# Patient Record
Sex: FEMALE | Race: BLACK OR AFRICAN AMERICAN | NOT HISPANIC OR LATINO | Employment: OTHER | URBAN - METROPOLITAN AREA
[De-identification: names, ages, dates, MRNs, and addresses within clinical notes are randomized per-mention and may not be internally consistent; named-entity substitution may affect disease eponyms.]

---

## 2017-02-07 ENCOUNTER — HOSPITAL ENCOUNTER (EMERGENCY)
Facility: HOSPITAL | Age: 67
Discharge: HOME | End: 2017-02-07
Attending: EMERGENCY MEDICINE
Payer: COMMERCIAL

## 2017-02-07 VITALS
SYSTOLIC BLOOD PRESSURE: 148 MMHG | OXYGEN SATURATION: 98 % | HEIGHT: 63 IN | HEART RATE: 98 BPM | BODY MASS INDEX: 28.35 KG/M2 | DIASTOLIC BLOOD PRESSURE: 65 MMHG | RESPIRATION RATE: 18 BRPM | WEIGHT: 160 LBS | TEMPERATURE: 98 F

## 2017-02-07 DIAGNOSIS — S46.911A RIGHT SHOULDER STRAIN, INITIAL ENCOUNTER: ICD-10-CM

## 2017-02-07 DIAGNOSIS — G44.209 HEADACHE, EMOTIONAL TENSION: ICD-10-CM
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
## 2017-02-07 PROCEDURE — 25000003 PHARM REV CODE 250: Performed by: NURSE PRACTITIONER

## 2017-02-07 PROCEDURE — 99283 EMERGENCY DEPT VISIT LOW MDM: CPT

## 2017-02-07 RX ORDER — IBUPROFEN 600 MG/1
600 TABLET ORAL
Status: COMPLETED | OUTPATIENT
Start: 2017-02-07 | End: 2017-02-07

## 2017-02-07 RX ORDER — METHOCARBAMOL 750 MG/1
1500 TABLET, FILM COATED ORAL
Status: COMPLETED | OUTPATIENT
Start: 2017-02-07 | End: 2017-02-07

## 2017-02-07 RX ORDER — NAPROXEN 500 MG/1
500 TABLET ORAL 2 TIMES DAILY WITH MEALS
Qty: 14 TABLET | Refills: 0 | Status: ON HOLD | OUTPATIENT
Start: 2017-02-07 | End: 2019-12-25

## 2017-02-07 RX ORDER — METHOCARBAMOL 500 MG/1
1000 TABLET, FILM COATED ORAL 3 TIMES DAILY
Qty: 30 TABLET | Refills: 0 | Status: SHIPPED | OUTPATIENT
Start: 2017-02-07 | End: 2017-02-12

## 2017-02-07 RX ADMIN — IBUPROFEN 600 MG: 600 TABLET, FILM COATED ORAL at 04:02

## 2017-02-07 RX ADMIN — METHOCARBAMOL 1500 MG: 750 TABLET ORAL at 04:02

## 2017-02-07 NOTE — DISCHARGE INSTRUCTIONS
Tension Headache    A muscle tension headache is a very common cause of head pain. Its also called a stress headache. When some people are under stress, they tense the muscles of their shoulder, neck, and scalp without knowing it. If this tension lasts long enough, a headache can occur. A tension headache can be quite painful. It can last for hours or even days.  Home care  Follow these tips when caring for yourself at home:  · Dont drive yourself home if you were given pain medicine for your headache. Instead, have someone else drive you home. Try to sleep when you get home. You should feel much better when you wake up.  · Put heat on the back of your neck to help ease neck spasm.  · Drink only clear liquids or eat a light diet until your symptoms get better. This will help you avoid nausea or vomiting.  How to prevent headaches  · Figure out what is causing stress in your life. Learn new ways to handle your stress. Ideas include regular exercise, biofeedback, self-hypnosis, yoga, and meditation. Talk with your healthcare provider to find out more information about managing stress. Many books and digital media are also available on this subject.  · Take time out at the first sign of a tension headache, if possible. Take yourself out of the stressful situation. Find a quiet, comfortable place to sit or lie down and let yourself relax. Heat and deep massage of the tight areas in the neck and shoulders may help ease muscle spasm. You may also get relief from a medicine like ibuprofen or a prescribed muscle relaxant.  Follow-up care  Follow up with your healthcare provider, or as advised. Talk with your provider if you have frequent headaches. He or she can figure out a treatment plan. Ask if you can have medicine to take at home the next time you get a bad headache. This may keep you from having to visit the emergency department in the future. You may need to see a headache specialist (neurologist) if you continue  to have headaches.  When to seek medical advice  Call your healthcare provider right away if any of these occur:  · Your head pain gets worse during sexual intercourse or strenuous activity  · Your head pain doesnt get better within 24 hours  · You arent able to keep liquids down (repeated vomiting)  · Fever of 100.4ºF (38ºC) or higher, or as directed by your healthcare provider  · Stiff neck  · Extreme drowsiness, confusion, or fainting  · Dizziness or dizziness with spinning sensation (vertigo)  · Weakness in an arm or leg or one side of your face  · You have difficulty speaking  · Your vision changes  Date Last Reviewed: 8/1/2016 © 2000-2016 [employer]. [address] [place] [address], [place] [address]. All rights reserved. This information is not intended as a substitute for professional medical care. Always follow your healthcare professional's instructions.          Self-Care for Strains and Sprains  Most minor strains and sprains can be treated with self-care. Recovering from a strain or sprain may take 6 to 8 weeks. Your self-care goal is to reduce pain and immobilize the injury to speed healing.     A sprain injures ligaments (tissue that connects bones to bones).        A strain injures muscles or tendons (tissue that connects muscles to bones).   Support the injured area  Wrapping the injured area provides support for short, necessary activities. Be careful not to wrap the area too tightly. This could cut off the blood supply.  · Support a wrist, elbow, or shoulder with a sling.  · Wrap an ankle or knee with an elastic bandage.  · Tape a finger or toe to the one next to it.  Use cold and heat  Cold reduces swelling. Both cold and heat reduce pain. Heat should not be used in the initial treatment of the injury. When using cold or heat, always place a towel between the pack and your skin.  · Apply ice or a cold pack 10 to 15 minutes every hour youre awake for the first 2 days.  · After the  swelling goes down, use cold or heat to control pain. Dont use heat late in the day, since it can cause swelling when youre not active.  Rest and elevate  Rest and elevation help your injury heal faster.  · Raise the injured area above your heart level.  · Keep the injured area from moving.  · Limit the use of the joint or limb.  Use medicine  · Aspirin reduces pain and swelling. (Note: Dont give aspirin to a child 18 or younger unless prescribed by the doctor.)  · Aspirin substitutes, such as ibuprofen, can reduce pain. Some substitutes reduce swelling, too. Ask your pharmacist which substitutes you can use.  Call your doctor if:  · The injured joint wont move, or bones make a grating sound when they move.  · You cant put weight on the injured area, even after 24 hours.  · The injured body part is cold, blue, or numb.  · The joint or limb appears bent or crooked.  · Pain increases or doesnt improve in 4 days.  · When pressing along the injured area, you notice a spot that is especially painful.   Date Last Reviewed: 9/29/2015 © 2000-2016 [employer]. [address] [place] [address], [place] [address]. All rights reserved. This information is not intended as a substitute for professional medical care. Always follow your healthcare professional's instructions.
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
## 2017-03-07 ENCOUNTER — CLINICAL SUPPORT (OUTPATIENT)
Dept: REHABILITATION | Facility: HOSPITAL | Age: 67
End: 2017-03-07
Attending: ORTHOPAEDIC SURGERY
Payer: MEDICARE

## 2017-03-07 DIAGNOSIS — M25.561 PAIN IN BOTH KNEES, UNSPECIFIED CHRONICITY: ICD-10-CM

## 2017-03-07 DIAGNOSIS — R29.898 WEAKNESS OF BOTH LOWER EXTREMITIES: ICD-10-CM

## 2017-03-07 DIAGNOSIS — M25.562 PAIN IN BOTH KNEES, UNSPECIFIED CHRONICITY: ICD-10-CM

## 2017-03-07 DIAGNOSIS — R26.89 DECREASED FUNCTIONAL MOBILITY: ICD-10-CM

## 2017-03-07 PROCEDURE — G8978 MOBILITY CURRENT STATUS: HCPCS | Mod: CL,PN

## 2017-03-07 PROCEDURE — 97110 THERAPEUTIC EXERCISES: CPT | Mod: PN

## 2017-03-07 PROCEDURE — 97161 PT EVAL LOW COMPLEX 20 MIN: CPT | Mod: PN

## 2017-03-07 PROCEDURE — G8979 MOBILITY GOAL STATUS: HCPCS | Mod: CK,PN
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
## 2017-04-13 ENCOUNTER — HOSPITAL ENCOUNTER (OUTPATIENT)
Dept: RADIOLOGY | Facility: HOSPITAL | Age: 67
Discharge: HOME | End: 2017-04-13
Attending: SPECIALIST
Payer: MEDICARE

## 2017-04-13 DIAGNOSIS — M75.101 UNSPECIFIED ROTATOR CUFF TEAR OR RUPTURE OF RIGHT SHOULDER, NOT SPECIFIED AS TRAUMATIC: ICD-10-CM

## 2017-04-13 PROCEDURE — 73221 MRI JOINT UPR EXTREM W/O DYE: CPT | Mod: TC,RT

## 2017-04-13 PROCEDURE — 73221 MRI JOINT UPR EXTREM W/O DYE: CPT | Mod: 26,RT,, | Performed by: RADIOLOGY

## 2017-05-31 ENCOUNTER — HOSPITAL ENCOUNTER (OUTPATIENT)
Dept: RADIOLOGY | Facility: HOSPITAL | Age: 67
Discharge: HOME | End: 2017-05-31
Attending: SPECIALIST
Payer: MEDICARE

## 2017-05-31 DIAGNOSIS — Z12.31 VISIT FOR SCREENING MAMMOGRAM: ICD-10-CM

## 2017-05-31 PROCEDURE — 77067 SCR MAMMO BI INCL CAD: CPT | Mod: TC

## 2017-05-31 PROCEDURE — 77067 SCR MAMMO BI INCL CAD: CPT | Mod: 26,,, | Performed by: RADIOLOGY

## 2017-09-14 DIAGNOSIS — M81.0 OP (OSTEOPOROSIS): Primary | ICD-10-CM

## 2017-09-25 ENCOUNTER — HOSPITAL ENCOUNTER (OUTPATIENT)
Dept: RADIOLOGY | Facility: HOSPITAL | Age: 67
Discharge: HOME | End: 2017-09-25
Attending: SPECIALIST
Payer: MEDICARE

## 2017-09-25 DIAGNOSIS — M81.0 OP (OSTEOPOROSIS): ICD-10-CM

## 2017-09-25 PROCEDURE — 77080 DXA BONE DENSITY AXIAL: CPT | Mod: TC

## 2017-09-25 PROCEDURE — 77080 DXA BONE DENSITY AXIAL: CPT | Mod: 26,,, | Performed by: RADIOLOGY

## 2018-01-09 ENCOUNTER — DOCUMENTATION ONLY (OUTPATIENT)
Dept: REHABILITATION | Facility: HOSPITAL | Age: 68
End: 2018-01-09

## 2018-01-09 DIAGNOSIS — M25.562 PAIN IN BOTH KNEES, UNSPECIFIED CHRONICITY: ICD-10-CM

## 2018-01-09 DIAGNOSIS — R29.898 WEAKNESS OF BOTH LOWER EXTREMITIES: ICD-10-CM

## 2018-01-09 DIAGNOSIS — M25.561 PAIN IN BOTH KNEES, UNSPECIFIED CHRONICITY: ICD-10-CM

## 2018-01-09 DIAGNOSIS — R26.89 DECREASED FUNCTIONAL MOBILITY: ICD-10-CM

## 2018-01-10 NOTE — PROGRESS NOTES
REHAB SERVICES OUTPATIENT DISCHARGE SUMMARY  Physical Therapy      Name:  Flor Erwin  Date:  1/9/18  Date of Evaluation: 3/7/17  Physician:  Cezar Shea MD  Total # Of Visits:  1  Diagnosis:    1. Pain in both knees, unspecified chronicity     2. Weakness of both lower extremities     3. Decreased functional mobility         Physical/Functional Status:  At time of discharge, patient status unknown secondary to pt not attending PT since initial evaluation on 3/7/17.     The patient is to be discharged from our Therapy service for the following reason(s):  Patient has not attended therapy since 3/7/17      Discharge Plan:  Other:  Pt is discharged from PT

## 2018-01-12 DIAGNOSIS — M17.9 OSTEOARTHRITIS OF KNEE, UNSPECIFIED (CODE): Primary | ICD-10-CM

## 2018-02-05 ENCOUNTER — CLINICAL SUPPORT (OUTPATIENT)
Dept: REHABILITATION | Facility: HOSPITAL | Age: 68
End: 2018-02-05
Payer: MEDICARE

## 2018-02-05 DIAGNOSIS — M25.561 CHRONIC PAIN OF RIGHT KNEE: ICD-10-CM

## 2018-02-05 DIAGNOSIS — R29.898 WEAKNESS OF BOTH LOWER EXTREMITIES: ICD-10-CM

## 2018-02-05 DIAGNOSIS — G89.29 CHRONIC PAIN OF RIGHT KNEE: ICD-10-CM

## 2018-02-05 DIAGNOSIS — M25.661 DECREASED ROM OF RIGHT KNEE: ICD-10-CM

## 2018-02-05 PROCEDURE — 97161 PT EVAL LOW COMPLEX 20 MIN: CPT | Mod: PN

## 2018-02-05 PROCEDURE — G8979 MOBILITY GOAL STATUS: HCPCS | Mod: CK,PN

## 2018-02-05 PROCEDURE — G8978 MOBILITY CURRENT STATUS: HCPCS | Mod: CL,PN

## 2018-02-05 PROCEDURE — 97110 THERAPEUTIC EXERCISES: CPT | Mod: PN
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
## 2018-02-08 ENCOUNTER — CLINICAL SUPPORT (OUTPATIENT)
Dept: REHABILITATION | Facility: HOSPITAL | Age: 68
End: 2018-02-08
Payer: MEDICARE

## 2018-02-08 DIAGNOSIS — G89.29 CHRONIC PAIN OF RIGHT KNEE: ICD-10-CM

## 2018-02-08 DIAGNOSIS — R29.898 WEAKNESS OF BOTH LOWER EXTREMITIES: ICD-10-CM

## 2018-02-08 DIAGNOSIS — M25.561 CHRONIC PAIN OF RIGHT KNEE: ICD-10-CM

## 2018-02-08 DIAGNOSIS — M25.661 DECREASED ROM OF RIGHT KNEE: ICD-10-CM

## 2018-02-08 PROCEDURE — 97110 THERAPEUTIC EXERCISES: CPT | Mod: PN

## 2018-02-08 PROCEDURE — 97140 MANUAL THERAPY 1/> REGIONS: CPT | Mod: PN

## 2018-02-08 NOTE — PROGRESS NOTES
"TIME RECORD    Date:  02/08/2018    Start Time:  10:15 AM  Stop Time:  11:00 AM    PROCEDURES:    TIMED  Procedure Min.   TE 30   MT 15                 UNTIMED  Procedure Min.             Total Timed Minutes:  45  Total Timed Units:  3  Total Untimed Units:  0  Charges Billed/# of units:  2TE, 1MT      Progress/Current Status    Subjective:     Patient ID: Flor Erwin is a 67 y.o. female.  Diagnosis:   1. Chronic pain of right knee     2. Weakness of both lower extremities     3. Decreased ROM of right knee       Pain: 7 /10  R knee medial pain today described as "Tingling" pain. Pt agreeable to PT session.  Pt states she takes ibuprofen / tylenol for pain control but tylenol is not really effective.     Objective:   Pt initiated session on sci fti stepper x 5 min for B LE/ UE Arom without rest breaks. Pt then taken to mat for supine therex as per logged below 1:1 w/ PTA. Pt ended session with manual therapy to R knee for retrograde edema, STM to R subpatellar aspect of knee, STM to general medial/ lateral knee, STM to IT band and quadriceps mm.      Date  2/8/18 2/5/18   VISIT 2 1   POC 4/5/18    Gcode 2/10 1/10   FOTO 2/5 1/5   Cap Visit   Cap Total 93.82  169.42     MT 15     Long Sit HS MT     Gastroc Str. MT     Bike sci fit   5 min      QS 5"x10 5"x10   SAQ      SLR 2x10 R 1x10   SL Abd 2x10 R     Heel Slides 10"x10     Johnnie Hip Add 5"x10     LAQs 2x10 R     Seated Hip Flex 2x10 R 2x10   Cybex   Leg Press      TKE      Hip Abd      Hip Flex      Hip Ext      HS Curls      Knee Ext      Step Ups      Step Downs      Gait      CP      Initials JA 1/6 CK         Assessment:     Pt tolerated session with no reports of increased pain. She states that manual therapy has help reduce her R knee pain. Noted pt has been scratching her R anterior tibial w/ small skin tears. (advised pt to cease scratching)    Patient Education/Response:     Cont HEP, instructed pt on use of ice pack and elevated LE for edema/ " swelling control.     Plans and Goals:     Cont to advance PT as per POC, monitor response to PT session.   Short Term Goals (4 Weeks): 3/5/18  1. Pt will be independent with HEP  2. Pt will improve (B) LE strength by at least 1/2 grade on MMT where deficits noted in order to improve functional mobility  3. Pt will improve FOTO knee survey score to </=60 % limited in order to demo improved functional mobility        Long Term Goals (8 Weeks): 4/5/18  1. Pt will be independent with updated HEP  2. Pt will improve (B) LE Strength by at least 1 full grade on MMT where deficits noted in order to improve functional mobility  3. Pt will improve (R) knee AROM = (L) knee AROM in order to be able to perform ADLs with less difficulty  4. Pt will report </= 3/10 pain in (R) knee when performing ADLs in order to be able to perform ADLs with less difficulty  5. Pt will improve FOTO knee survey score to </= 45% limited in order demo improved functional mobility

## 2018-02-14 ENCOUNTER — TELEPHONE (OUTPATIENT)
Dept: REHABILITATION | Facility: HOSPITAL | Age: 68
End: 2018-02-14

## 2018-05-22 DIAGNOSIS — Z12.31 SCREENING MAMMOGRAM, ENCOUNTER FOR: Primary | ICD-10-CM

## 2018-06-05 ENCOUNTER — HOSPITAL ENCOUNTER (OUTPATIENT)
Dept: RADIOLOGY | Facility: HOSPITAL | Age: 68
Discharge: HOME | End: 2018-06-05
Attending: SPECIALIST
Payer: MEDICARE

## 2018-06-05 DIAGNOSIS — Z12.31 SCREENING MAMMOGRAM, ENCOUNTER FOR: ICD-10-CM

## 2018-06-05 PROCEDURE — 77067 SCR MAMMO BI INCL CAD: CPT | Mod: 26,,, | Performed by: RADIOLOGY

## 2018-06-05 PROCEDURE — 77067 SCR MAMMO BI INCL CAD: CPT | Mod: TC

## 2018-12-06 ENCOUNTER — TELEPHONE (OUTPATIENT)
Dept: ORTHOPEDICS | Facility: CLINIC | Age: 68
End: 2018-12-06

## 2018-12-06 NOTE — TELEPHONE ENCOUNTER
----- Message from Mariah Britt sent at 12/6/2018 12:05 PM CST -----  Contact: [phone]/self  Patient would like to be seen sooner than the next available appointment. Please advise.

## 2019-02-26 ENCOUNTER — DOCUMENTATION ONLY (OUTPATIENT)
Dept: REHABILITATION | Facility: HOSPITAL | Age: 69
End: 2019-02-26

## 2019-02-26 DIAGNOSIS — G89.29 CHRONIC PAIN OF RIGHT KNEE: ICD-10-CM

## 2019-02-26 DIAGNOSIS — M25.661 DECREASED ROM OF RIGHT KNEE: ICD-10-CM

## 2019-02-26 DIAGNOSIS — R29.898 WEAKNESS OF BOTH LOWER EXTREMITIES: ICD-10-CM

## 2019-02-26 DIAGNOSIS — M25.561 CHRONIC PAIN OF RIGHT KNEE: ICD-10-CM

## 2019-02-26 NOTE — PROGRESS NOTES
REHAB SERVICES OUTPATIENT DISCHARGE SUMMARY  Physical Therapy      Name:  Flor Erwin  Date:  2/26/19  Date of Evaluation:  2/5/18  Physician:  Shruthi St MD  Total # Of Visits:  2    Diagnosis:    1. Chronic pain of right knee     2. Weakness of both lower extremities     3. Decreased ROM of right knee         Physical/Functional Status:  At time of discharge, patient status unknown 2/2 to pt not attending PT since 2/8/18.     The patient is to be discharged from our Therapy service for the following reason(s):  Patient has not attended therapy since 2/8/18    Degree of Goal Achievement:  Patient has not met goals secondary to:  Only attending one PT follow up session after initial evaluation      Discharge Plan:  Other:  DC from PT

## 2019-05-28 DIAGNOSIS — Z12.39 SCREENING BREAST EXAMINATION: Primary | ICD-10-CM

## 2019-06-06 ENCOUNTER — HOSPITAL ENCOUNTER (OUTPATIENT)
Dept: RADIOLOGY | Facility: HOSPITAL | Age: 69
Discharge: HOME | End: 2019-06-06
Attending: SPECIALIST
Payer: MEDICARE

## 2019-06-06 DIAGNOSIS — Z12.39 SCREENING BREAST EXAMINATION: ICD-10-CM

## 2019-06-06 PROCEDURE — 77067 SCR MAMMO BI INCL CAD: CPT | Mod: TC

## 2019-06-06 PROCEDURE — 77067 SCR MAMMO BI INCL CAD: CPT | Mod: 26,,, | Performed by: RADIOLOGY

## 2019-06-06 PROCEDURE — 77067 MAMMO DIGITAL SCREENING BILAT WITH CAD: ICD-10-PCS | Mod: 26,,, | Performed by: RADIOLOGY

## 2019-12-05 ENCOUNTER — TELEPHONE (OUTPATIENT)
Dept: ORTHOPEDICS | Facility: CLINIC | Age: 69
End: 2019-12-05

## 2019-12-05 NOTE — TELEPHONE ENCOUNTER
----- Message from [employer] sent at 12/5/2019  8:33 AM CST -----  Contact: [phone]/self  Type:  Sooner Apoointment Request    Caller is requesting a sooner appointment.  Caller declined first available appointment listed below.  Caller will not accept being placed on the waitlist and is requesting a message be sent to doctor.  Name of Caller:patient   When is the first available appointment? 12-  Symptoms:right hand middle finger pain  Would the patient rather a call back or a response via [facility]? Callback   Best Call Back Number:[phone]  Additional Information: none

## 2019-12-25 ENCOUNTER — HOSPITAL ENCOUNTER (OUTPATIENT)
Facility: HOSPITAL | Age: 69
Discharge: HOME | End: 2019-12-26
Attending: EMERGENCY MEDICINE | Admitting: INTERNAL MEDICINE
Payer: MEDICARE

## 2019-12-25 DIAGNOSIS — R42 VERTIGO: ICD-10-CM

## 2019-12-25 DIAGNOSIS — I48.91 NEW ONSET A-FIB: ICD-10-CM

## 2019-12-25 DIAGNOSIS — I48.91 ATRIAL FIBRILLATION WITH RVR: Primary | ICD-10-CM

## 2019-12-25 DIAGNOSIS — E05.90 HYPERTHYROIDISM: ICD-10-CM

## 2019-12-25 LAB
ALBUMIN SERPL BCP-MCNC: 3.4 G/DL (ref 3.5–5.2)
ALP SERPL-CCNC: 125 U/L (ref 55–135)
ALT SERPL W/O P-5'-P-CCNC: 28 U/L (ref 10–44)
ANION GAP SERPL CALC-SCNC: 11 MMOL/L (ref 8–16)
AST SERPL-CCNC: 21 U/L (ref 10–40)
BASOPHILS # BLD AUTO: 0.01 K/UL (ref 0–0.2)
BASOPHILS NFR BLD: 0.2 % (ref 0–1.9)
BILIRUB SERPL-MCNC: 0.5 MG/DL (ref 0.1–1)
BNP SERPL-MCNC: 264 PG/ML (ref 0–99)
BUN SERPL-MCNC: 13 MG/DL (ref 8–23)
CALCIUM SERPL-MCNC: 9.8 MG/DL (ref 8.7–10.5)
CHLORIDE SERPL-SCNC: 108 MMOL/L (ref 95–110)
CO2 SERPL-SCNC: 24 MMOL/L (ref 23–29)
CREAT SERPL-MCNC: 0.7 MG/DL (ref 0.5–1.4)
DIFFERENTIAL METHOD: ABNORMAL
EOSINOPHIL # BLD AUTO: 0.1 K/UL (ref 0–0.5)
EOSINOPHIL NFR BLD: 1.5 % (ref 0–8)
ERYTHROCYTE [DISTWIDTH] IN BLOOD BY AUTOMATED COUNT: 13.4 % (ref 11.5–14.5)
EST. GFR  (AFRICAN AMERICAN): >60 ML/MIN/1.73 M^2
EST. GFR  (NON AFRICAN AMERICAN): >60 ML/MIN/1.73 M^2
FERRITIN SERPL-MCNC: 714 NG/ML (ref 20–300)
FOLATE SERPL-MCNC: 11 NG/ML (ref 4–24)
GLUCOSE SERPL-MCNC: 97 MG/DL (ref 70–110)
HCT VFR BLD AUTO: 36.3 % (ref 37–48.5)
HGB BLD-MCNC: 11.7 G/DL (ref 12–16)
INR PPP: 1 (ref 0.8–1.2)
IRON SERPL-MCNC: 48 UG/DL (ref 30–160)
LYMPHOCYTES # BLD AUTO: 1.6 K/UL (ref 1–4.8)
LYMPHOCYTES NFR BLD: 34.7 % (ref 18–48)
MCH RBC QN AUTO: 26.9 PG (ref 27–31)
MCHC RBC AUTO-ENTMCNC: 32.2 G/DL (ref 32–36)
MCV RBC AUTO: 83 FL (ref 82–98)
MONOCYTES # BLD AUTO: 0.7 K/UL (ref 0.3–1)
MONOCYTES NFR BLD: 15.4 % (ref 4–15)
NEUTROPHILS # BLD AUTO: 2.2 K/UL (ref 1.8–7.7)
NEUTROPHILS NFR BLD: 48.2 % (ref 38–73)
PLATELET # BLD AUTO: 300 K/UL (ref 150–350)
PMV BLD AUTO: 10.1 FL (ref 9.2–12.9)
POTASSIUM SERPL-SCNC: 4 MMOL/L (ref 3.5–5.1)
PROT SERPL-MCNC: 6.5 G/DL (ref 6–8.4)
PROTHROMBIN TIME: 10.9 SEC (ref 9–12.5)
RBC # BLD AUTO: 4.35 M/UL (ref 4–5.4)
SATURATED IRON: 19 % (ref 20–50)
SODIUM SERPL-SCNC: 143 MMOL/L (ref 136–145)
T3FREE SERPL-MCNC: 5.6 PG/ML (ref 2.3–4.2)
T4 FREE SERPL-MCNC: 2.49 NG/DL (ref 0.71–1.51)
TOTAL IRON BINDING CAPACITY: 255 UG/DL (ref 250–450)
TRANSFERRIN SERPL-MCNC: 172 MG/DL (ref 200–375)
TROPONIN I SERPL DL<=0.01 NG/ML-MCNC: <0.006 NG/ML (ref 0–0.03)
TSH SERPL DL<=0.005 MIU/L-ACNC: <0.01 UIU/ML (ref 0.4–4)
VIT B12 SERPL-MCNC: 608 PG/ML (ref 210–950)
WBC # BLD AUTO: 4.55 K/UL (ref 3.9–12.7)

## 2019-12-25 PROCEDURE — G0378 HOSPITAL OBSERVATION PER HR: HCPCS

## 2019-12-25 PROCEDURE — 82746 ASSAY OF FOLIC ACID SERUM: CPT

## 2019-12-25 PROCEDURE — 93005 ELECTROCARDIOGRAM TRACING: CPT

## 2019-12-25 PROCEDURE — 96374 THER/PROPH/DIAG INJ IV PUSH: CPT

## 2019-12-25 PROCEDURE — 36415 COLL VENOUS BLD VENIPUNCTURE: CPT

## 2019-12-25 PROCEDURE — 25000003 PHARM REV CODE 250: Performed by: EMERGENCY MEDICINE

## 2019-12-25 PROCEDURE — 80053 COMPREHEN METABOLIC PANEL: CPT

## 2019-12-25 PROCEDURE — 93010 ELECTROCARDIOGRAM REPORT: CPT | Mod: 76,,, | Performed by: INTERNAL MEDICINE

## 2019-12-25 PROCEDURE — 93010 ELECTROCARDIOGRAM REPORT: CPT | Mod: ,,, | Performed by: INTERNAL MEDICINE

## 2019-12-25 PROCEDURE — 36000 PLACE NEEDLE IN VEIN: CPT

## 2019-12-25 PROCEDURE — 84481 FREE ASSAY (FT-3): CPT

## 2019-12-25 PROCEDURE — 85025 COMPLETE CBC W/AUTO DIFF WBC: CPT

## 2019-12-25 PROCEDURE — 82728 ASSAY OF FERRITIN: CPT

## 2019-12-25 PROCEDURE — 84439 ASSAY OF FREE THYROXINE: CPT

## 2019-12-25 PROCEDURE — 82607 VITAMIN B-12: CPT

## 2019-12-25 PROCEDURE — 84484 ASSAY OF TROPONIN QUANT: CPT

## 2019-12-25 PROCEDURE — 93010 EKG 12-LEAD: ICD-10-PCS | Mod: 76,,, | Performed by: INTERNAL MEDICINE

## 2019-12-25 PROCEDURE — 96372 THER/PROPH/DIAG INJ SC/IM: CPT

## 2019-12-25 PROCEDURE — 85610 PROTHROMBIN TIME: CPT

## 2019-12-25 PROCEDURE — 25000003 PHARM REV CODE 250: Performed by: STUDENT IN AN ORGANIZED HEALTH CARE EDUCATION/TRAINING PROGRAM

## 2019-12-25 PROCEDURE — 99291 CRITICAL CARE FIRST HOUR: CPT | Mod: 25

## 2019-12-25 PROCEDURE — 84443 ASSAY THYROID STIM HORMONE: CPT

## 2019-12-25 PROCEDURE — 63600175 PHARM REV CODE 636 W HCPCS: Performed by: STUDENT IN AN ORGANIZED HEALTH CARE EDUCATION/TRAINING PROGRAM

## 2019-12-25 PROCEDURE — 83880 ASSAY OF NATRIURETIC PEPTIDE: CPT

## 2019-12-25 PROCEDURE — 83540 ASSAY OF IRON: CPT

## 2019-12-25 RX ORDER — BENAZEPRIL HYDROCHLORIDE 20 MG/1
20 TABLET ORAL DAILY
Status: DISCONTINUED | OUTPATIENT
Start: 2019-12-26 | End: 2019-12-27 | Stop reason: HOSPADM

## 2019-12-25 RX ORDER — AMLODIPINE BESYLATE 5 MG/1
10 TABLET ORAL DAILY
Status: DISCONTINUED | OUTPATIENT
Start: 2019-12-26 | End: 2019-12-27 | Stop reason: HOSPADM

## 2019-12-25 RX ORDER — ENOXAPARIN SODIUM 100 MG/ML
40 INJECTION SUBCUTANEOUS EVERY 24 HOURS
Status: DISCONTINUED | OUTPATIENT
Start: 2019-12-25 | End: 2019-12-27 | Stop reason: HOSPADM

## 2019-12-25 RX ORDER — PROPRANOLOL HYDROCHLORIDE 10 MG/1
30 TABLET ORAL 3 TIMES DAILY
Status: DISCONTINUED | OUTPATIENT
Start: 2019-12-25 | End: 2019-12-27 | Stop reason: HOSPADM

## 2019-12-25 RX ORDER — OXYBUTYNIN CHLORIDE 5 MG/1
5 TABLET ORAL 3 TIMES DAILY
COMMUNITY
End: 2021-04-14

## 2019-12-25 RX ORDER — METHIMAZOLE 5 MG/1
5 TABLET ORAL 3 TIMES DAILY
Status: DISCONTINUED | OUTPATIENT
Start: 2019-12-25 | End: 2019-12-27 | Stop reason: HOSPADM

## 2019-12-25 RX ORDER — PROPRANOLOL HYDROCHLORIDE 10 MG/1
40 TABLET ORAL 3 TIMES DAILY
Status: DISCONTINUED | OUTPATIENT
Start: 2019-12-25 | End: 2019-12-25

## 2019-12-25 RX ORDER — PROPRANOLOL HYDROCHLORIDE 20 MG/1
20 TABLET ORAL 3 TIMES DAILY
Status: ON HOLD | COMMUNITY
End: 2019-12-26 | Stop reason: HOSPADM

## 2019-12-25 RX ORDER — NAPROXEN SODIUM 220 MG/1
81 TABLET, FILM COATED ORAL DAILY
COMMUNITY
End: 2022-09-06

## 2019-12-25 RX ORDER — NAPROXEN SODIUM 220 MG/1
81 TABLET, FILM COATED ORAL DAILY
Status: DISCONTINUED | OUTPATIENT
Start: 2019-12-26 | End: 2019-12-27 | Stop reason: HOSPADM

## 2019-12-25 RX ORDER — METHIMAZOLE 5 MG/1
5 TABLET ORAL 3 TIMES DAILY
COMMUNITY
End: 2020-07-28

## 2019-12-25 RX ORDER — DILTIAZEM HYDROCHLORIDE 5 MG/ML
10 INJECTION INTRAVENOUS
Status: COMPLETED | OUTPATIENT
Start: 2019-12-25 | End: 2019-12-25

## 2019-12-25 RX ORDER — NAPROXEN SODIUM 220 MG/1
81 TABLET, FILM COATED ORAL DAILY
Status: DISCONTINUED | OUTPATIENT
Start: 2019-12-25 | End: 2019-12-25

## 2019-12-25 RX ADMIN — METHIMAZOLE 5 MG: 5 TABLET ORAL at 10:12

## 2019-12-25 RX ADMIN — DILTIAZEM HYDROCHLORIDE 10 MG: 5 INJECTION INTRAVENOUS at 10:12

## 2019-12-25 RX ADMIN — METHIMAZOLE 5 MG: 5 TABLET ORAL at 03:12

## 2019-12-25 RX ADMIN — PROPRANOLOL HYDROCHLORIDE 30 MG: 10 TABLET ORAL at 03:12

## 2019-12-25 RX ADMIN — ENOXAPARIN SODIUM 40 MG: 100 INJECTION SUBCUTANEOUS at 05:12

## 2019-12-25 NOTE — ED PROVIDER NOTES
Encounter Date: 12/25/2019    SCRIBE #1 NOTE: I, Janel Ann, am scribing for, and in the presence of,  Dr. Seo. I have scribed the entire note.       History     Chief Complaint   Patient presents with    Dizziness     reports intermittent dizziness for 2weeks. was seen at PCP last week for same symptoms.      Flor Erwin is a 69 y.o. female who  has a past medical history of Hypertension.    The patient presents to the ED due to intermittent dizziness with onset two weeks ago. Patient reports she felt dizziness the worst this morning with generalized weakness which prompted ED visit. Symptoms are elicited every time she stands up, but alleviated when sitting at rest. She states she was seen by PCP, Alma Rosa Pederson, who prescribed her medication for dizziness. However, three days ago patient's medication was changed due to thyroid infection. Upon evaluation, patient is asymptomatic. Patient endorses drinking plenty of fluids with no decrease in appetite. She denies any headache, chest pain, shortness of breath, abdominal pain, nausea, vomiting or changes in vision.      The history is provided by the patient.     Review of patient's allergies indicates:   Allergen Reactions    Penicillins      Past Medical History:   Diagnosis Date    Hypertension      Past Surgical History:   Procedure Laterality Date    CHOLECYSTECTOMY      HYSTERECTOMY      partial in her 40's    OOPHORECTOMY       History reviewed. No pertinent family history.  Social History     Tobacco Use    Smoking status: Never Smoker   Substance Use Topics    Alcohol use: No    Drug use: No     Review of Systems   Constitutional: Negative for chills, fatigue and fever.   HENT: Negative for facial swelling, trouble swallowing and voice change.    Eyes: Negative for photophobia, pain and redness.   Respiratory: Negative for cough, choking and shortness of breath.    Cardiovascular: Negative for chest pain, palpitations and leg swelling.    Gastrointestinal: Negative for abdominal pain, diarrhea, nausea and vomiting.   Genitourinary: Negative for difficulty urinating, frequency and urgency.   Musculoskeletal: Negative for back pain, neck pain and neck stiffness.   Neurological: Positive for dizziness and weakness (generalized). Negative for seizures, speech difficulty, light-headedness, numbness and headaches.   All other systems reviewed and are negative.      Physical Exam     Initial Vitals [12/25/19 0837]   BP Pulse Resp Temp SpO2   (!) 145/76 (!) 118 20 98.7 °F (37.1 °C) 96 %      MAP       --         Physical Exam    Nursing note and vitals reviewed.  Constitutional: She appears well-developed and well-nourished. No distress.   HENT:   Head: Normocephalic and atraumatic.   Mouth/Throat: Oropharynx is clear and moist.   Eyes: Conjunctivae and EOM are normal. Pupils are equal, round, and reactive to light.   Neck: Normal range of motion. Neck supple.   No JVD.   Cardiovascular: Intact distal pulses.   Irregularly irregular.    Pulmonary/Chest: Breath sounds normal. No respiratory distress. She has no wheezes. She has no rhonchi. She has no rales.   Abdominal: Soft. Bowel sounds are normal. She exhibits no distension. There is no tenderness.   Musculoskeletal: Normal range of motion. She exhibits no edema or tenderness.   No peripheral edema.   Neurological: She is alert and oriented to person, place, and time. She has normal strength. No cranial nerve deficit.   Skin: Skin is warm and dry. Capillary refill takes less than 2 seconds.         ED Course   Critical Care  Date/Time: 12/25/2019 10:45 AM  Performed by: Nenita Seo MD  Authorized by: Nenita Seo MD   Total critical care time (exclusive of procedural time) : 30 minutes  Critical care time was exclusive of separately billable procedures and treating other patients and teaching time.  Critical care was necessary to treat or prevent imminent or life-threatening deterioration of the  following conditions: circulatory failure and cardiac failure.  Critical care was time spent personally by me on the following activities: evaluation of patient's response to treatment, ordering and performing treatments and interventions, pulse oximetry, discussions with consultants, obtaining history from patient or surrogate, ordering and review of radiographic studies, review of old charts, examination of patient, ordering and review of laboratory studies and re-evaluation of patient's condition.        Labs Reviewed   B-TYPE NATRIURETIC PEPTIDE - Abnormal; Notable for the following components:       Result Value     (*)     All other components within normal limits   CBC W/ AUTO DIFFERENTIAL - Abnormal; Notable for the following components:    Hemoglobin 11.7 (*)     Hematocrit 36.3 (*)     Mean Corpuscular Hemoglobin 26.9 (*)     Mono% 15.4 (*)     All other components within normal limits   COMPREHENSIVE METABOLIC PANEL - Abnormal; Notable for the following components:    Albumin 3.4 (*)     All other components within normal limits   TSH - Abnormal; Notable for the following components:    TSH <0.010 (*)     All other components within normal limits   T4, FREE - Abnormal; Notable for the following components:    Free T4 2.49 (*)     All other components within normal limits   PROTIME-INR   TROPONIN I     EKG Readings: (Independently Interpreted)   Initial Reading: No STEMI.   8:39 AM. Rate of 107 bpm, Atrial Fibrillation with RVR. No history of old EKG's.        X-Rays:   Independently Interpreted Readings:   Other Readings:  Reviewed by myself, read by radiology.     Imaging Results          X-Ray Chest AP Portable (Final result)  Result time 12/25/19 09:55:02    Final result by Michael Gagnon MD (12/25/19 09:55:02)                 Impression:      No acute findings.      Electronically signed by: Michael Gagnon MD  Date:    12/25/2019  Time:    09:55             Narrative:    EXAMINATION:  XR CHEST AP  PORTABLE    CLINICAL HISTORY:  vertigo;    TECHNIQUE:  Single frontal view of the chest was performed.    COMPARISON:  None    FINDINGS:  EKG leads overlie chest obscuring detail.  Calcification of the aortic arch.  Cardiomediastinal silhouette is not enlarged.  No focal pulmonary consolidation.  Degenerative changes of the spine and shoulders.                               CT Head Without Contrast (Final result)  Result time 12/25/19 09:38:24    Final result by Michael Gagnon MD (12/25/19 09:38:24)                 Impression:      No definite acute intracranial abnormality.  Incidental findings as above.      Electronically signed by: Michael Gagnon MD  Date:    12/25/2019  Time:    09:38             Narrative:    EXAMINATION:  CT HEAD WITHOUT CONTRAST    CLINICAL HISTORY:  Dizziness;    TECHNIQUE:  Low dose axial images were obtained through the head.  Coronal and sagittal reformations were also performed. Contrast was not administered.    COMPARISON:  None.    FINDINGS:  Ventricles and the basilar cisterns remain patent.  No hydrocephalus.  Midline structures are intact.    Orbits are symmetric in appearance.  Skull is intact.  Paranasal sinuses and mastoid air cells are clear.  No definite acute intracranial bleed.  There is no extra-axial collection.  No CT evidence to suggest an acute cerebrovascular accident.  There is high signal intensity within the brainstem/johnson most likely secondary to artifact.  However, the patient has symptoms referable to this region then evaluation with MRI could be performed as indicated.    Calcification of the carotid siphons is seen.  Visualized paranasal sinuses and mastoid air cells are clear.                              Medical Decision Making:   Clinical Tests:   Lab Tests: Ordered and Reviewed  Radiological Study: Ordered and Reviewed  Medical Tests: Ordered and Reviewed                   ED Course as of Dec 25 1045   Wed Dec 25, 2019   1042 Case discussed with Dr. Gutierrez.  The patient is in new onset a fib with RVR and is hyperthyroid, she has been taking new medications for this for 3 days.    [ST]      ED Course User Index  [ST] Nenita Seo MD                Clinical Impression:       ICD-10-CM ICD-9-CM   1. Atrial fibrillation with RVR I48.91 427.31   2. Vertigo R42 780.4   3. Hyperthyroidism E05.90 242.90           I, Nenita Seo, personally performed the services described in this documentation. All medical record entries made by the scribe were at my direction and in my presence.  I have reviewed the chart and agree that the record reflects my personal performance and is accurate and complete. Nenita Seo M.D. 10:45 AM12/25/2019               Nenita Seo MD  12/25/19 1046

## 2019-12-25 NOTE — ED NOTES
Pt resting on stretcher with no complaints. Eyes closed. Awakens to name.  at bedside. SR up and CB in reach. On cardiac monitor. Denies chest pain or shortness of breath. Will continue to monitor.

## 2019-12-25 NOTE — PLAN OF CARE
VN cued into pt's room for introduction with pt's permission.  VN role explained and informed pt that VN would be working with bedside nurse and rest of care team.  Fall risk and bed alarm protocol education provided.  Instructed pt to call for assistance.  Pt aware and agreeable.  Pt's chart, labs and vital signs reviewed.  Allowed time for questions.  No acute distress noted.  Will continue to be available as needed.

## 2019-12-25 NOTE — H&P
"[facility] Medicine H&P Note     Admitting Team: hospitals Hospitalist Team B  Attending Physician: Cale Gutierrez MD  Resident:  Dr. Navid Casey    Date of Admit: 12/25/2019    Chief Complaint     Dizziness x 2 weeks    Subjective:      History of Present Illness:  Flor Erwin is a 69 y.o. female with HTN, overactive bladder, recently diagnosed hyperthyroidism p/w episodes of lightheadedness, palpitations, and dyspnea x 2 weeks which returned the morning of presentation    The patient was in their usual state of health until (lives at home independent of all ADLs without baseline complaints) 2 weeks ago developed sudden onset of reported "dizziness" associated with palpitations, dyspnea, and generalized weakness. Episodes occurring with getting up and ambulating. Initially saw her PCP and prescribed meclizine which cause worse palpitations. Was called back by her PCP last week and told she had a thyroid problem and was started on propanolol and methotrexate. Was doing well until this morning when she began cooking [employer] dinner. Had return of lightheadedness with palpitations and dyspnea resolved after several minutes laying down. Family was concerned and she continued for feel woozy which prompted her to present to the ED for evaluation. On review patient describes symptoms as primarily lightheadedness, without kike vertigo or dizziness.    Denies chest pain, nausea, diaphoresis, syncope, headache, abdominal pain, diarrhea, tinnitus, hearing loss, facial weakness, sensory loss, paraesthesias, numbness, gait instability or disequilibrium.     Endorses chronic itching for which she was prescribe a steroid cream. No rashes. Denies prior cardiac history. Never smoker, no etoh, no drug use.     Brief Hospital Course:  Pt presented to ED noted to have elevated HR to 118 with irregular rhythm, EKG with atrial fibrillation. Otherwise homodontically stable. HR improved with diltiazem push x 1.     Past Medical " History:  Past Medical History:   Diagnosis Date    Hypertension        Past Surgical History:  Past Surgical History:   Procedure Laterality Date    CHOLECYSTECTOMY      HYSTERECTOMY      partial in her 40's    OOPHORECTOMY         Allergies:  Review of patient's allergies indicates:   Allergen Reactions    Penicillins        Home Medications:  Prior to Admission medications    Medication Sig Start Date End Date Taking? Authorizing Provider   aspirin 81 MG Chew Take 81 mg by mouth once daily.   Yes Historical Provider, MD   methIMAzole (TAPAZOLE) 5 MG Tab Take 5 mg by mouth 3 (three) times daily.   Yes Historical Provider, MD   oxybutynin (DITROPAN) 5 MG Tab Take 5 mg by mouth 3 (three) times daily.   Yes Historical Provider, MD   propranolol (INDERAL) 20 MG tablet Take 20 mg by mouth 3 (three) times daily.   Yes Historical Provider, MD   vitamin E 100 UNIT capsule Take 100 Units by mouth once daily.   Yes Historical Provider, MD   amlodipine-benazepril 10-20mg (LOTREL) 10-20 mg per capsule Take 1 capsule by mouth once daily.    Historical Provider, MD   betamethasone dipropionate (DIPROLENE) 0.05 % ointment Apply topically 2 (two) times daily as directed. 1/16/19   Joanne Lee MD   naproxen (NAPROSYN) 500 MG tablet Take 1 tablet (500 mg total) by mouth 2 (two) times daily with meals. 2/7/17   YVON Holman     Family History:  History reviewed. No pertinent family history.    Social History:  Social History     Tobacco Use    Smoking status: Never Smoker   Substance Use Topics    Alcohol use: No    Drug use: No     Lives at home with friend, grown children - not at house  No barriers to ADLs    Review of Systems:  Constitutional: Negative for chills, diaphoresis, fatigue, fever and unexpected weight change.   HENT: Negative for congestion and sore throat.    Respiratory: Negative for cough, shortness of breath and wheezing.    Cardiovascular: + palpitations, lightheadedness, Negative  for chest pain and leg swelling.   Gastrointestinal: Negative for abdominal pain, constipation, diarrhea, nausea and vomiting.   Genitourinary: + urinary frequency, Negative for dysuria and hematuria.   Musculoskeletal: Negative for arthralgias and myalgias.   Skin: Negative for rash and wound.   Neurological: Negative for light-headedness, numbness and headaches.   Pertinent findings as above and per HPI. All other systems are reviewed and are negative.    Health Maintaince :   Primary Care Physician: Dr. Shruthi St    Immunizations:   TDap unknown  Pna up to date through pcp  Flu up to date this year  Shingles up to date    Cancer Screening:  PAP: s/p hysterectomy  MMG: up to date  Colonoscopy: reportedly up to date (received x 2)  Low-Dose CT: mot indicated     Objective:   Last 24 Hour Vital Signs:  BP  Min: 132/68  Max: 132/68  Temp  Av.2 °F (36.2 °C)  Min: 97.2 °F (36.2 °C)  Max: 97.2 °F (36.2 °C)  Pulse  Av  Min: 76  Max: 84  Resp  Av.3  Min: 18  Max: 26  SpO2  Av %  Min: 100 %  Max: 100 %  Weight  Av kg (147 lb 11.3 oz)  Min: 67 kg (147 lb 11.3 oz)  Max: 67 kg (147 lb 11.3 oz)  BP  Min: 120/61  Max: 145/76  Temp  Av °F (36.7 °C)  Min: 97.2 °F (36.2 °C)  Max: 98.7 °F (37.1 °C)  Pulse  Av.9  Min: 69  Max: 118  Resp  Av.7  Min: 18  Max: 29  SpO2  Av %  Min: 96 %  Max: 100 %  Weight  Av.5 kg (148 lb 13.7 oz)  Min: 67 kg (147 lb 11.3 oz)  Max: 68 kg (150 lb)  Body mass index is 26.17 kg/m².  No intake/output data recorded.    Physical Examination:  Gen:  Elderly AA female, in NAD resting comfortably in hospital bed  HENT:  AT/NC, external auditory canals clear, OP clear, fair dentition, MMM  EYES:  EOMi, no nystagmus, PERRL, no icterus, no conjunctival pallor  NECK:  Trachea midline, Supple, full ROM, no carotid bruits, JVP estimated 5cm   CV:  RRR, maximal impulse non-displaced, no m/r/g  Pulses:  Radial, DP, PT 2+ bilateral and symmetric  PULM:  No distress,  equal chest rise, CTAB, no w/r/r  ABD:   S, non-distended, non-tender throughout. No rebound or guarding  Back:  Spine Midline, nontender  SKIN:   No rashes, erythema, cyanosis, breakdown  Extremity: No edema, clubbing  NEURO:  A/Ox4, Face symmetric with intact sensation to light touch, Tongue midline, equal palatal rise, equal and intact shoulder shrug/neck turn, hearing intact bilaterally. Muscle bulk and tone wnl, strength 5/5 elbow flexion/extension, wrist flexion extension, IO ab/aduction, hand  4/5 limited to hand pain. Sensation intact bilateral fingers, toes, legs to light touch and microfilament. FTN and HTS testing intact bilaterally. No dysdiadochokinesia.   Psychiatric:  Appropriate mood and affect, calm, cooperative, pleasant    Orthostatic vitals: supine 132/69 HR 90  sitting 134/63 87  standing 136/70 90    Laboratory:  Most Recent Data:  CBC:   Lab Results   Component Value Date    WBC 4.55 12/25/2019    HGB 11.7 (L) 12/25/2019    HCT 36.3 (L) 12/25/2019     12/25/2019    MCV 83 12/25/2019    RDW 13.4 12/25/2019     WBC Differential: diff wnl  BMP:   Lab Results   Component Value Date     12/25/2019    K 4.0 12/25/2019     12/25/2019    CO2 24 12/25/2019    BUN 13 12/25/2019    CREATININE 0.7 12/25/2019    GLU 97 12/25/2019    CALCIUM 9.8 12/25/2019     LFTs:   Lab Results   Component Value Date    PROT 6.5 12/25/2019    ALBUMIN 3.4 (L) 12/25/2019    BILITOT 0.5 12/25/2019    AST 21 12/25/2019    ALKPHOS 125 12/25/2019    ALT 28 12/25/2019     Coags:   Lab Results   Component Value Date    INR 1.0 12/25/2019     FLP: No results found for: CHOL, HDL, LDLCALC, TRIG, CHOLHDL  DM:   Lab Results   Component Value Date    CREATININE 0.7 12/25/2019     Thyroid:   Lab Results   Component Value Date    TSH <0.010 (L) 12/25/2019    FREET4 2.49 (H) 12/25/2019     Anemia: No results found for: IRON, TIBC, FERRITIN, [id number], FOLATE  Cardiac:   Lab Results   Component Value Date     TROPONINI <0.006 12/25/2019     (H) 12/25/2019     Urinalysis:   Lab Results   Component Value Date    COLORU Straw 05/07/2015    SPECGRAV 1.005 05/07/2015    NITRITE Negative 05/07/2015    KETONESU Negative 05/07/2015    UROBILINOGEN Negative 05/07/2015       Trended Lab Data:  Recent Labs   Lab 12/25/19  0909   WBC 4.55   HGB 11.7*   HCT 36.3*      MCV 83   RDW 13.4      K 4.0      CO2 24   BUN 13   CREATININE 0.7   GLU 97   PROT 6.5   ALBUMIN 3.4*   BILITOT 0.5   AST 21   ALKPHOS 125   ALT 28       Trended Cardiac Data:  Recent Labs   Lab 12/25/19  0909   TROPONINI <0.006   *         Microbiology Data:  none    Other Results:  EKG (my interpretation): atrial fibrillation with RVR to 108, no acute ischemic changes    Radiology:  12/25 CXR: Calcified aortic arch, degenerative changes to spine and shoulders, otherwise no acute cardiopulmonary findings.    12/25 CT head: no acute intracranial findings... High intensity noted w/i brainstem/johnson read as most likely 2/2 artifact.    Assessment/Plan:     Flor Erwin is a 69 y.o. female with HTN, overactive bladder, recently diagnosed hyperthyroidism p/w episodes of lightheadedness, palpitations, and dyspnea, found to have new onset afib with RVR at presentation likely 2/2 hyperthyroidism, now with return to normal sinus rhythm.      #New onset afib with RVR  - 2 weeks of intermittent palpitations, dyspnea on exertion, lightheadedness. Recent diagnosis of hyperthyroidism as below.   - CHADSVASC - 3  - on home propanolol 20mg TID   - orthostatics negative  - presentation EKG with Afib mild RVR at 108, no ischemic changes  - given diltiazem 10mg IV in ED with improved rate control  - return to NSR on telemetry, EKG to confirm  - uptitrate propanolol to 30mg TID, monitor for tolerance  - formal echo to eval for structural disease   - continue telemetry monitoring overnight, discuss anticoagulation at discharge    #Hyperthyroidism  -  dx 1 week ago by PCP started on Methimazole 5mg TID. No thyromegaly, compression.   - TSH here <0.010  Free Tf 2.49  free T3 5.6  - continue methimazole  - ordered thyroid ultrasound   - consult Endocrinology    #abnormal CT   - incidental hyperintensity noted on CT imaging  - patient reported symptoms of palpations and lightheadedness w/o actual vertigo, gait instability, or dizziness. History and exam without concerns for neurological deficit  - low suspicion for CVA as etiology of presentation    #HTN  - home meds amlodipine/benazepril 10-20 daily  - mildly elevated 145/76 at presentation  - continue home meds    #Normocytic Anemia  - Hgb 11.7, no recent baseline. No active bleeding, cancer screening up to date  - Iron profile, ferritin, B12, folate  - follow up with PCP    #Overactive bladder   - no acute issues, increased nightime frequency reported  - prescribed oxybutinin 5 TID but not taking  - hold home meds inpatient unless symptomatic    P#ruritis  - no acute issues, no rash diffuse and chronic  - Rx betamethason ointment by PCP    Code: Full  PPx: Lovenox  Diet: Regular  Dispo: Admit to telemetry for monitoring for rate control overnight, likely discharge home tomorrow.     Navid Casey MD  [facility] Medicine HO-II     [facility] Medicine Hospitalist Pager numbers:   [facility] Hospitalist Medicine Team A (Elfego/Anila): [phone]  [facility] Hospitalist Medicine Team B (Matt/Stoney):  [phone]

## 2019-12-25 NOTE — ED NOTES
APPEARANCE: Alert, oriented and in no acute distress.  CARDIAC: Tachycardic rate and irregular rhythm, no murmur heard.   PERIPHERAL VASCULAR: peripheral pulses present. Normal cap refill. No edema. Warm to touch.    RESPIRATORY:Normal rate and effort, breath sounds clear bilaterally throughout chest. Respirations are equal and unlabored no obvious signs of distress.  GASTRO: soft, bowel sounds normal, no tenderness, no abdominal distention.  MUSC: Full ROM. No bony tenderness or soft tissue tenderness. No obvious deformity.  SKIN: Skin is warm and dry, normal skin turgor, mucous membranes moist.  NEURO: 5/5 strength major flexors/extensors bilaterally. Sensory intact to light touch bilaterally. [place] coma scale: eyes open spontaneously-4, oriented & converses-5, obeys commands-6. No neurological abnormalities.   MENTAL STATUS: awake, alert and aware of environment.  EYE: PERRL, both eyes: pupils brisk and reactive to light. Normal size.  ENT: EARS: no obvious drainage. NOSE: no active bleeding.   Pt reports dizziness this morning.

## 2019-12-26 VITALS
HEART RATE: 61 BPM | OXYGEN SATURATION: 96 % | HEIGHT: 63 IN | SYSTOLIC BLOOD PRESSURE: 115 MMHG | BODY MASS INDEX: 26.17 KG/M2 | RESPIRATION RATE: 18 BRPM | WEIGHT: 147.69 LBS | DIASTOLIC BLOOD PRESSURE: 58 MMHG | TEMPERATURE: 97 F

## 2019-12-26 PROBLEM — I10 ESSENTIAL HYPERTENSION: Status: ACTIVE | Noted: 2019-12-26

## 2019-12-26 PROBLEM — R35.89 POLYURIA: Status: ACTIVE | Noted: 2019-12-26

## 2019-12-26 PROBLEM — E05.90 HYPERTHYROIDISM: Status: ACTIVE | Noted: 2019-12-26

## 2019-12-26 PROBLEM — D64.9 NORMOCYTIC ANEMIA: Status: ACTIVE | Noted: 2019-12-26

## 2019-12-26 PROBLEM — I48.91 NEW ONSET A-FIB: Status: ACTIVE | Noted: 2019-12-26

## 2019-12-26 PROBLEM — I48.0 PAROXYSMAL A-FIB: Status: ACTIVE | Noted: 2019-12-26

## 2019-12-26 PROBLEM — L29.9 PRURITUS: Status: ACTIVE | Noted: 2019-12-26

## 2019-12-26 LAB
ALBUMIN SERPL BCP-MCNC: 3.1 G/DL (ref 3.5–5.2)
ALP SERPL-CCNC: 110 U/L (ref 55–135)
ALT SERPL W/O P-5'-P-CCNC: 24 U/L (ref 10–44)
ANION GAP SERPL CALC-SCNC: 12 MMOL/L (ref 8–16)
AST SERPL-CCNC: 20 U/L (ref 10–40)
BASOPHILS # BLD AUTO: 0.01 K/UL (ref 0–0.2)
BASOPHILS NFR BLD: 0.2 % (ref 0–1.9)
BILIRUB SERPL-MCNC: 0.4 MG/DL (ref 0.1–1)
BUN SERPL-MCNC: 13 MG/DL (ref 8–23)
CALCIUM SERPL-MCNC: 9.5 MG/DL (ref 8.7–10.5)
CHLORIDE SERPL-SCNC: 109 MMOL/L (ref 95–110)
CO2 SERPL-SCNC: 20 MMOL/L (ref 23–29)
CREAT SERPL-MCNC: 0.6 MG/DL (ref 0.5–1.4)
DIFFERENTIAL METHOD: ABNORMAL
EOSINOPHIL # BLD AUTO: 0.1 K/UL (ref 0–0.5)
EOSINOPHIL NFR BLD: 1.3 % (ref 0–8)
ERYTHROCYTE [DISTWIDTH] IN BLOOD BY AUTOMATED COUNT: 13.6 % (ref 11.5–14.5)
EST. GFR  (AFRICAN AMERICAN): >60 ML/MIN/1.73 M^2
EST. GFR  (NON AFRICAN AMERICAN): >60 ML/MIN/1.73 M^2
GLUCOSE SERPL-MCNC: 79 MG/DL (ref 70–110)
HCT VFR BLD AUTO: 34.3 % (ref 37–48.5)
HGB BLD-MCNC: 10.9 G/DL (ref 12–16)
LYMPHOCYTES # BLD AUTO: 1.8 K/UL (ref 1–4.8)
LYMPHOCYTES NFR BLD: 39.6 % (ref 18–48)
MCH RBC QN AUTO: 26.5 PG (ref 27–31)
MCHC RBC AUTO-ENTMCNC: 31.8 G/DL (ref 32–36)
MCV RBC AUTO: 84 FL (ref 82–98)
MONOCYTES # BLD AUTO: 0.9 K/UL (ref 0.3–1)
MONOCYTES NFR BLD: 19.8 % (ref 4–15)
NEUTROPHILS # BLD AUTO: 1.8 K/UL (ref 1.8–7.7)
NEUTROPHILS NFR BLD: 39.1 % (ref 38–73)
PLATELET # BLD AUTO: 315 K/UL (ref 150–350)
PMV BLD AUTO: 10.8 FL (ref 9.2–12.9)
POTASSIUM SERPL-SCNC: 3.9 MMOL/L (ref 3.5–5.1)
PROT SERPL-MCNC: 6 G/DL (ref 6–8.4)
RBC # BLD AUTO: 4.11 M/UL (ref 4–5.4)
SODIUM SERPL-SCNC: 141 MMOL/L (ref 136–145)
WBC # BLD AUTO: 4.55 K/UL (ref 3.9–12.7)

## 2019-12-26 PROCEDURE — 36415 COLL VENOUS BLD VENIPUNCTURE: CPT

## 2019-12-26 PROCEDURE — 85025 COMPLETE CBC W/AUTO DIFF WBC: CPT

## 2019-12-26 PROCEDURE — 80053 COMPREHEN METABOLIC PANEL: CPT

## 2019-12-26 PROCEDURE — 94761 N-INVAS EAR/PLS OXIMETRY MLT: CPT

## 2019-12-26 PROCEDURE — 25000003 PHARM REV CODE 250: Performed by: STUDENT IN AN ORGANIZED HEALTH CARE EDUCATION/TRAINING PROGRAM

## 2019-12-26 PROCEDURE — G0378 HOSPITAL OBSERVATION PER HR: HCPCS

## 2019-12-26 PROCEDURE — 83519 RIA NONANTIBODY: CPT

## 2019-12-26 RX ORDER — PROPRANOLOL HYDROCHLORIDE 10 MG/1
30 TABLET ORAL 3 TIMES DAILY
Qty: 270 TABLET | Refills: 3 | Status: SHIPPED | OUTPATIENT
Start: 2019-12-26 | End: 2021-04-14

## 2019-12-26 RX ADMIN — PROPRANOLOL HYDROCHLORIDE 30 MG: 10 TABLET ORAL at 08:12

## 2019-12-26 RX ADMIN — METHIMAZOLE 5 MG: 5 TABLET ORAL at 03:12

## 2019-12-26 RX ADMIN — ASPIRIN 81 MG 81 MG: 81 TABLET ORAL at 08:12

## 2019-12-26 RX ADMIN — BENAZEPRIL HYDROCHLORIDE 20 MG: 20 TABLET, COATED ORAL at 08:12

## 2019-12-26 RX ADMIN — METHIMAZOLE 5 MG: 5 TABLET ORAL at 08:12

## 2019-12-26 RX ADMIN — AMLODIPINE BESYLATE 10 MG: 5 TABLET ORAL at 08:12

## 2019-12-26 RX ADMIN — PROPRANOLOL HYDROCHLORIDE 30 MG: 10 TABLET ORAL at 03:12

## 2019-12-26 NOTE — PROGRESS NOTES
"[facility] Medicine Progress Note    Admitting Team: [facility] Hospitalist Team B  Attending Physician: Cale Gutierrez MD  Resident: Dr. Navid Casey    Subjective:      No acute changes overnight. No ectopy noted on telemetry. Patient feeling well this morning. Ambulating around room without issues. Denies further lightheadedness, palpitations, dyspnea. No chest pain, nausea, emesis, abdominal pain. No headache, numbness, weakness.     Objective:     Last 24 Hour Vital Signs:  BP  Min: 116/56  Max: 145/76  Temp  Av.4 °F (36.9 °C)  Min: 97.2 °F (36.2 °C)  Max: 98.9 °F (37.2 °C)  Pulse  Av.4  Min: 69  Max: 118  Resp  Av.1  Min: 16  Max: 29  SpO2  Av.5 %  Min: 93 %  Max: 100 %  Height  Av' 3" (160 cm)  Min: 5' 3" (160 cm)  Max: 5' 3" (160 cm)  Weight  Av.5 kg (148 lb 13.7 oz)  Min: 67 kg (147 lb 11.3 oz)  Max: 68 kg (150 lb)  I/O last 3 completed shifts:  In: 425 [P.O.:425]  Out: 900 [Urine:900]    Physical Examination:  Gen:                Elderly AA female, in NAD resting comfortably in hospital bed  HENT:             AT/NC, external auditory canals clear, OP clear, fair dentition, MMM  EYES:             EOMi, no nystagmus, PERRL, no icterus, no conjunctival pallor  NECK:             Trachea midline, Supple, full ROM, no carotid bruits, JVP estimated 5cm   CV:                  RRR, maximal impulse non-displaced, no m/r/g  Pulses:           Radial, DP, PT 2+ bilateral and symmetric  PULM:             No distress, equal chest rise, CTAB, no w/r/r  ABD:               S, non-distended, non-tender throughout. No rebound or guarding  Back:              Spine Midline, nontender  SKIN:              No rashes, erythema, cyanosis, breakdown  Extremity:      No edema, clubbing  NEURO:          A/Ox4, Face symmetric with intact sensation to light touch, Tongue midline, equal palatal rise, equal and intact shoulder shrug/neck turn, hearing intact bilaterally. Muscle bulk and tone wnl, strength 5/5 elbow " flexion/extension, wrist flexion extension, IO ab/aduction, hand  4/5 limited to hand pain. Sensation intact bilateral fingers, toes, legs to light touch and microfilament. FTN and HTS testing intact bilaterally. No dysdiadochokinesia.   Psychiatric:    Appropriate mood and affect, calm, cooperative, pleasant    Laboratory:  Laboratory Data Reviewed: yes  Recent Labs   Lab 12/25/19  0909 12/26/19  0413   WBC 4.55 4.55   HGB 11.7* 10.9*   HCT 36.3* 34.3*    315   MCV 83 84   RDW 13.4 13.6    141   K 4.0 3.9    109   CO2 24 20*   BUN 13 13   CREATININE 0.7 0.6   GLU 97 79   PROT 6.5 6.0   ALBUMIN 3.4* 3.1*   BILITOT 0.5 0.4   AST 21 20   ALKPHOS 125 110   ALT 28 24     WBC differential: wnl    EKG (my interpretation): atrial fibrillation with RVR to 108, no acute ischemic changes     Radiology:  12/25 CXR: Calcified aortic arch, degenerative changes to spine and shoulders, otherwise no acute cardiopulmonary findings.     12/25 CT head: no acute intracranial findings... High intensity noted w/i brainstem/johnson read as most likely 2/2 artifact.  Current Medications:     Infusions:       Scheduled:   amLODIPine  10 mg Oral Daily    aspirin  81 mg Oral Daily    benazepril  20 mg Oral Daily    enoxaparin  40 mg Subcutaneous Daily    methIMAzole  5 mg Oral TID    propranolol  30 mg Oral TID        PRN:    Assessment:   Flor Erwin is a 69 y.o. female with HTN, overactive bladder, recently diagnosed hyperthyroidism p/w episodes of lightheadedness, palpitations, and dyspnea, found to have new onset afib with RVR at presentation likely 2/2 hyperthyroidism, now with return to normal sinus rhythm.      #New onset afib with RVR  - 2 weeks of intermittent palpitations, dyspnea on exertion, lightheadedness. Recent diagnosis of hyperthyroidism as below.   - CHADSVASC - 3  - on home propanolol 20mg TID   - orthostatics negative  - presentation EKG with Afib mild RVR at 108, no ischemic changes  -  given diltiazem 10mg IV in ED with improved rate control  - return to NSR on telemetry, EKG to confirm  - continue propanolol 30mg TID  - formal echo to eval for structural disease      #Hyperthyroidism  - dx 1 week ago by PCP started on Methimazole 5mg TID. No thyromegaly, compression.   - TSH here <0.010  Free Tf 2.49  free T3 5.6  - continue methimazole  - thyroid us with heterogeneous hypervascular gland: 7mm hypoechoic nodule mid right lobe, sub 6cm solid nodule lower right lobe, 1cm nodule solid in left inferior lobe  - no nodules at criteria for FNA  - NM thyroid scan ordered by PCP  - consult Endocrinology     #abnormal CT   - incidental hyperintensity noted on CT imaging  - patient reported symptoms of palpations and lightheadedness w/o actual vertigo, gait instability, or dizziness. History and exam without concerns for neurological deficit  - low suspicion for CVA as etiology of presentation     #HTN  - home meds amlodipine/benazepril 10-20 daily  - mildly elevated 145/76 at presentation  - continue home meds     #Normocytic Anemia  - Hgb 11.7, no recent baseline. No active bleeding, cancer screening up to date  - iron 48  TIBD 255  sat 19%  Ferrritn 714  folate and B12 wnl  - follow up with PCP     #Overactive bladder   - no acute issues, increased nightime frequency reported  - prescribed oxybutinin 5 TID but not taking  - hold home meds inpatient unless symptomatic  - likely 2/2 hyperthyroidism, anticipate improvement with continued treatment for thyroid      #Pruritis  - no acute issues, no rash diffuse and chronic  - Rx betamethason ointment by PCP  - likely 2/2 hyperthyroidism, continue to treat thyroid     Code: Full  PPx: Lovenox  Diet: Regular  Dispo: Pt feeling well, no further ectopic heart rate, likely discharge today     Navid Casey MD  [facility] Internal Medicine HO-II      [facility] Medicine Hospitalist Pager numbers:   [facility] Hospitalist Medicine Team A (Elfego/Anila):          [phone]  [facility]  Hospitalist Medicine Team B (Matt/Stoney):        [phone]

## 2019-12-26 NOTE — PLAN OF CARE
Pt arrived to unit via stretcher; ambulated to room. Pt oriented to room, VN introduced, iPad offered and declined. Pt placed on fall risk. Plan of care reviewed with patient. Patient voiced understanding. Placed on monitor. No acute distress noted. Side rails x 2, bed in lowest position, call bell within reach, pt advised to call for assistance. Maintain bed alarm for patient safety.

## 2019-12-26 NOTE — PLAN OF CARE
Reported no pain.  Sister and family at bed side during shift change.  Educated regarding tapazole treatment, hyperthyroid and hypothyroid.   Pt understood.  Held propranolol for BP of 116/56 @ 2200    Tele: SR,  HR 60,  No alarms.     Bed in lowest position, wheels locked, non skid socks, ID band worn, personal items and call bell with in reach, bed alarm set.

## 2019-12-26 NOTE — PLAN OF CARE
Patient AAOx3  Lives at home with friend  No dme or home health    Patient states she will make own followup with PCP- she spoke to dr. St and she will be out of town for 3 weeks    This  put name on white board and explained blue discharge folder to patient. Discharge planning brochure and/or business card given to patient.  Patient verbalized understanding.       12/26/19 1359   Discharge Assessment   Assessment Type Discharge Planning Assessment   Confirmed/corrected address and phone number on facesheet? Yes   Assessment information obtained from? Patient   Communicated expected length of stay with patient/caregiver yes   Prior to hospitilization cognitive status: Alert/Oriented   Prior to hospitalization functional status: Independent   Current cognitive status: Alert/Oriented   Current Functional Status: Independent   Lives With sibling(s)   Able to Return to Prior Arrangements yes   Is patient able to care for self after discharge? Yes   Patient's perception of discharge disposition home or selfcare   Readmission Within the Last 30 Days no previous admission in last 30 days   Patient currently being followed by outpatient case management? No   Patient currently receives any other outside agency services? No   Equipment Currently Used at Home none   Do you have any problems affording any of your prescribed medications? Yes   Is the patient taking medications as prescribed? yes   Does the patient have transportation home? Yes   Transportation Anticipated family or friend will provide   Discharge Plan A Home;Home with family   Discharge Plan B Home;Home with family   DME Needed Upon Discharge  none     Chacha Bell, RN, CCM, CMSRN  RN Transition Navigator  [phone]

## 2019-12-27 LAB
AORTIC ROOT ANNULUS: 2.41 CM
AORTIC VALVE CUSP SEPERATION: 1.66 CM
AV INDEX (PROSTH): 0.75
AV MEAN GRADIENT: 6 MMHG
AV PEAK GRADIENT: 14 MMHG
AV VALVE AREA: 2.13 CM2
AV VELOCITY RATIO: 0.77
CV ECHO LV RWT: 0.29 CM
DOP CALC AO PEAK VEL: 1.89 M/S
DOP CALC AO VTI: 36.07 CM
DOP CALC LVOT AREA: 2.8 CM2
DOP CALC LVOT DIAMETER: 1.9 CM
DOP CALC LVOT PEAK VEL: 1.45 M/S
DOP CALC LVOT STROKE VOLUME: 76.77 CM3
DOP CALCLVOT PEAK VEL VTI: 27.09 CM
E WAVE DECELERATION TIME: 251.57 MSEC
E/A RATIO: 1.08
ECHO LV POSTERIOR WALL: 0.6 CM (ref 0.6–1.1)
FRACTIONAL SHORTENING: 47 % (ref 28–44)
INTERVENTRICULAR SEPTUM: 1 CM (ref 0.6–1.1)
IVRT: 0.06 MSEC
LEFT ATRIUM SIZE: 2.8 CM
LEFT INTERNAL DIMENSION IN SYSTOLE: 2.22 CM (ref 2.1–4)
LEFT VENTRICLE DIASTOLIC VOLUME INDEX: 50.06 ML/M2
LEFT VENTRICLE DIASTOLIC VOLUME: 85.1 ML
LEFT VENTRICLE MASS INDEX: 60 G/M2
LEFT VENTRICLE SYSTOLIC VOLUME INDEX: 9.8 ML/M2
LEFT VENTRICLE SYSTOLIC VOLUME: 16.64 ML
LEFT VENTRICULAR INTERNAL DIMENSION IN DIASTOLE: 4.2 CM (ref 3.5–6)
LEFT VENTRICULAR MASS: 101.29 G
MV PEAK A VEL: 0.93 M/S
MV PEAK E VEL: 1 M/S
PISA TR MAX VEL: 2.86 M/S
PULM VEIN S/D RATIO: 1.75
PV PEAK D VEL: 0.36 M/S
PV PEAK S VEL: 0.63 M/S
RA PRESSURE: 3 MMHG
RIGHT VENTRICULAR END-DIASTOLIC DIMENSION: 2.41 CM
TR MAX PG: 33 MMHG
TV REST PULMONARY ARTERY PRESSURE: 36 MMHG

## 2019-12-27 NOTE — NURSING
VN cued into the patient's room with permission. I have reviewed the AVS in detail with the patient.. The patient was informed about her diagnosis and when to follow up with her physician. Her medications and education were thoroughly explained. All questions and concerns were addressed.

## 2020-01-02 LAB — TSH BII SER-ACNC: 88 % INHIBITION

## 2020-01-03 NOTE — DISCHARGE SUMMARY
"[facility] Medicine Discharge Summary    Primary Team: [facility] Hospitalist Team B  Attending Physician: Dr. Gutierrez  Resident: Dr. Mueller    Date of Admit: 12/25/2019  Date of Discharge: 12/26/2019    Discharge to: Home  Condition: Stable    Discharge Diagnoses     Patient Active Problem List   Diagnosis    Atrial fibrillation with RVR    New onset a-fib    Paroxysmal A-fib    Hyperthyroidism    Essential hypertension    Polyuria 2/2 hyperthyroidism    Normocytic anemia    Pruritus       Consultants and Procedures     Consultants:  None    Procedures:   None    Brief History of Present Illness      Flor Erwin is a 69 y.o. female with HTN, overactive bladder, recently diagnosed hyperthyroidism p/w episodes of lightheadedness, palpitations, and dyspnea x 2 weeks which returned the morning of presentation     The patient was in their usual state of health until (lives at home independent of all ADLs without baseline complaints) 2 weeks ago developed sudden onset of reported "dizziness" associated with palpitations, dyspnea, and generalized weakness. Episodes occurring with getting up and ambulating. Initially saw her PCP and prescribed meclizine which cause worse palpitations. Was called back by her PCP last week and told she had a thyroid problem and was started on propanolol and methotrexate. Was doing well until this morning when she began cooking [employer] dinner. Had return of lightheadedness with palpitations and dyspnea resolved after several minutes laying down. Family was concerned and she continued for feel woozy which prompted her to present to the ED for evaluation. On review patient describes symptoms as primarily lightheadedness, without kike vertigo or dizziness.     Denies chest pain, nausea, diaphoresis, syncope, headache, abdominal pain, diarrhea, tinnitus, hearing loss, facial weakness, sensory loss, paraesthesias, numbness, gait instability or disequilibrium.       Brief Hospital " Course:  Pt presented to ED noted to have elevated HR to 118 with irregular rhythm, EKG with atrial fibrillation. Otherwise homodontically stable. HR improved with diltiazem push x 1. Neurological exam with normal.     For the full HPI please refer to the History & Physical from this admission.    Hospital Course By Problem with Pertinent Findings       #New onset afib with RVR  - 2 weeks of intermittent palpitations, dyspnea on exertion, lightheadedness. Recent diagnosis of hyperthyroidism as below.   - CHADSVASC - 3  - on home propanolol 20mg TID   - orthostatics negative  - presentation EKG with Afib mild RVR at 108, no ischemic changes  - given diltiazem 10mg IV in ED with improved rate control  - return to NSR after admission to floor  - continue propanolol 30mg TID at discharge  - formal echo to eval for structural disease with results pending at time of discharge (pt desiring to return home for the holidays), will call patient with results if abnormalities noted on echo  - given risks with of continued paroxysmal afib in setting of hyperthyroidism, will treat with eliquis 5mg bid. If confirmed resolution of afib after treatment of hyperthyroidism, could be reasonable to discontinue OAC at that time     #Hyperthyroidism  - dx 1 week ago by PCP started on Methimazole 5mg TID. No thyromegaly, compression.   - TSH here <0.010  Free Tf 2.49  free T3 5.6  - continue methimazole  - thyroid us with heterogeneous hypervascular gland: 7mm hypoechoic nodule mid right lobe, sub 6cm solid nodule lower right lobe, 1cm nodule solid in left inferior lobe  - no nodules at criteria for FNA  - NM thyroid scan ordered by PCP  - referral placed for Endocrinology follow up, will need to be scheduled after holidays     #abnormal CT   - incidental hyperintensity noted on CT imaging  - patient reported symptoms of palpations and lightheadedness w/o actual vertigo, gait instability, or dizziness. History and exam without concerns  for neurological deficit  - low suspicion for CVA as etiology of presentation     #HTN  - home meds amlodipine/benazepril 10-20 daily  - mildly elevated 145/76 at presentation  - continue home meds     #Normocytic Anemia  - Hgb 11.7, no recent baseline. No active bleeding, cancer screening up to date  - iron 48  TIBD 255  sat 19%  Ferrritn 714  folate and B12 wnl  - follow up with PCP     #Overactive bladder   - no acute issues, increased nightime frequency reported  - prescribed oxybutinin 5 TID but not taking  - hold home meds inpatient unless symptomatic  - likely 2/2 hyperthyroidism, anticipate improvement with continued treatment for thyroid      #Pruritis  - no acute issues, no rash diffuse and chronic  - Rx betamethason ointment by PCP  - likely 2/2 hyperthyroidism, continue to treat thyroid       Studies Pending At Discharge  Thyrotropin-Binding Inhibitory Ig    Discharge Medications      CUONG Erwin   Home Medication Instructions SANDRITA:[id number]    Printed on:01/02/20 2012   Medication Information                      amlodipine-benazepril 10-20mg (LOTREL) 10-20 mg per capsule  Take 1 capsule by mouth once daily.             apixaban (ELIQUIS) 5 mg Tab  Take 1 tablet (5 mg total) by mouth 2 (two) times daily.             aspirin 81 MG Chew  Take 81 mg by mouth once daily.             betamethasone dipropionate (DIPROLENE) 0.05 % ointment  Apply topically 2 (two) times daily as directed.             methIMAzole (TAPAZOLE) 5 MG Tab  Take 5 mg by mouth 3 (three) times daily.             oxybutynin (DITROPAN) 5 MG Tab  Take 5 mg by mouth 3 (three) times daily.             propranolol (INDERAL) 10 MG tablet  Take 3 tablets (30 mg total) by mouth 3 (three) times daily.                 Discharge Information:   Diet:  Cardiac    Physical Activity:  As tolearted             Instructions:  1. Take all medications as prescribed  2. Keep all follow-up appointments  3. Return to the hospital or call your primary  care physicians if any worsening symptoms such as fever, chest pain, shortness of breath, return of symptoms, or any other concerns.    Counceling  Advised patient on need for medication compliance and need for hospital follow up after discharge. Counceled on importance of compliance with OAC therapy to reduce risk of stroke/embolic disease.     Follow-Up Appointments:  Discharged over holiday, advised on need for follow up with PCP in 1-2 weeks from discharge  - referral placed for endocrinology for assistance and outpatient follow up of hyperthyroidism  - referral to [facility] cardiology for follow up of a-fib    No future appointments.    Navid Casye MD  [facility] Internal Medicine, HO-II

## 2020-01-08 ENCOUNTER — TELEPHONE (OUTPATIENT)
Dept: CARDIOLOGY | Facility: CLINIC | Age: 70
End: 2020-01-08

## 2020-01-08 ENCOUNTER — OFFICE VISIT (OUTPATIENT)
Dept: CARDIOLOGY | Facility: CLINIC | Age: 70
End: 2020-01-08
Payer: MEDICARE

## 2020-01-08 ENCOUNTER — LAB VISIT (OUTPATIENT)
Dept: LAB | Facility: HOSPITAL | Age: 70
End: 2020-01-08
Attending: INTERNAL MEDICINE
Payer: MEDICARE

## 2020-01-08 VITALS
DIASTOLIC BLOOD PRESSURE: 68 MMHG | WEIGHT: 151.13 LBS | HEART RATE: 56 BPM | OXYGEN SATURATION: 99 % | BODY MASS INDEX: 26.78 KG/M2 | SYSTOLIC BLOOD PRESSURE: 105 MMHG | HEIGHT: 63 IN

## 2020-01-08 DIAGNOSIS — E05.90 THYROTOXICOSIS, UNSPECIFIED WITHOUT THYROTOXIC CRISIS OR STORM: ICD-10-CM

## 2020-01-08 DIAGNOSIS — E05.90 HYPERTHYROIDISM: ICD-10-CM

## 2020-01-08 DIAGNOSIS — E05.90 THYROTOXICOSIS WITHOUT THYROID STORM, UNSPECIFIED THYROTOXICOSIS TYPE: ICD-10-CM

## 2020-01-08 DIAGNOSIS — I10 ESSENTIAL HYPERTENSION: ICD-10-CM

## 2020-01-08 DIAGNOSIS — I48.91 ATRIAL FIBRILLATION: ICD-10-CM

## 2020-01-08 DIAGNOSIS — I35.1 NONRHEUMATIC AORTIC VALVE INSUFFICIENCY: ICD-10-CM

## 2020-01-08 DIAGNOSIS — I48.0 PAF (PAROXYSMAL ATRIAL FIBRILLATION): Primary | ICD-10-CM

## 2020-01-08 LAB
CHOLEST SERPL-MCNC: 169 MG/DL (ref 120–199)
CHOLEST/HDLC SERPL: 3.9 {RATIO} (ref 2–5)
HDLC SERPL-MCNC: 43 MG/DL (ref 40–75)
HDLC SERPL: 25.4 % (ref 20–50)
LDLC SERPL CALC-MCNC: 107.2 MG/DL (ref 63–159)
NONHDLC SERPL-MCNC: 126 MG/DL
TRIGL SERPL-MCNC: 94 MG/DL (ref 30–150)

## 2020-01-08 PROCEDURE — 99213 OFFICE O/P EST LOW 20 MIN: CPT | Mod: PBBFAC,PO | Performed by: INTERNAL MEDICINE

## 2020-01-08 PROCEDURE — 36415 COLL VENOUS BLD VENIPUNCTURE: CPT

## 2020-01-08 PROCEDURE — 1159F PR MEDICATION LIST DOCUMENTED IN MEDICAL RECORD: ICD-10-PCS | Mod: ,,, | Performed by: INTERNAL MEDICINE

## 2020-01-08 PROCEDURE — 1159F MED LIST DOCD IN RCRD: CPT | Mod: ,,, | Performed by: INTERNAL MEDICINE

## 2020-01-08 PROCEDURE — 93010 ELECTROCARDIOGRAM REPORT: CPT | Mod: S$PBB,,, | Performed by: INTERNAL MEDICINE

## 2020-01-08 PROCEDURE — 1126F PR PAIN SEVERITY QUANTIFIED, NO PAIN PRESENT: ICD-10-PCS | Mod: ,,, | Performed by: INTERNAL MEDICINE

## 2020-01-08 PROCEDURE — 93005 ELECTROCARDIOGRAM TRACING: CPT | Mod: PBBFAC,PO | Performed by: INTERNAL MEDICINE

## 2020-01-08 PROCEDURE — 99999 PR PBB SHADOW E&M-EST. PATIENT-LVL III: ICD-10-PCS | Mod: PBBFAC,,, | Performed by: INTERNAL MEDICINE

## 2020-01-08 PROCEDURE — 99204 PR OFFICE/OUTPT VISIT, NEW, LEVL IV, 45-59 MIN: ICD-10-PCS | Mod: S$PBB,,, | Performed by: INTERNAL MEDICINE

## 2020-01-08 PROCEDURE — 99999 PR PBB SHADOW E&M-EST. PATIENT-LVL III: CPT | Mod: PBBFAC,,, | Performed by: INTERNAL MEDICINE

## 2020-01-08 PROCEDURE — 1126F AMNT PAIN NOTED NONE PRSNT: CPT | Mod: ,,, | Performed by: INTERNAL MEDICINE

## 2020-01-08 PROCEDURE — 99204 OFFICE O/P NEW MOD 45 MIN: CPT | Mod: S$PBB,,, | Performed by: INTERNAL MEDICINE

## 2020-01-08 PROCEDURE — 80061 LIPID PANEL: CPT

## 2020-01-08 PROCEDURE — 93010 EKG 12-LEAD: ICD-10-PCS | Mod: S$PBB,,, | Performed by: INTERNAL MEDICINE

## 2020-01-08 NOTE — TELEPHONE ENCOUNTER
----- Message from Chema Mujica MD sent at 1/8/2020 11:58 AM CST -----  Please let her know, lipid level is borderline ok. No need to start medication. Just dietary changes with low fat diet recommended. Thanks

## 2020-01-08 NOTE — LETTER
January 8, 2020      Navid Casey MD  [address] [facility] [address]           [facility] Cardiology  [address] [place] [address] [address]  [facility] [address]  Phone: [phone]          Patient: Flor Erwin   MR Number: [id number]   YOB: 1950   Date of Visit: 1/8/2020       Dear Dr. Navid Casey:    Thank you for referring Flor Erwin to me for evaluation. Attached you will find relevant portions of my assessment and plan of care.    If you have questions, please do not hesitate to call me. I look forward to following Flor Erwin along with you.    Sincerely,    Chema Mujica MD    Enclosure  CC:  No Recipients    If you would like to receive this communication electronically, please contact [email or web address] or [phone] to request more information on [employer] access.    For providers and/or their staff who would like to refer a patient to [facility], please contact us through our one-stop-shop provider referral line, [facility], at [phone].    If you feel you have received this communication in error or would no longer like to receive these types of communications, please e-mail [email or web address]

## 2020-01-08 NOTE — PROGRESS NOTES
Subjective:   @Patient ID:  Flor Erwin is a 69 y.o. female who presents for evaluation of Atrial fibrillation      HPI:   Patient to establish care  Patient was hospitalized  with A.fib with RVR which was a new onset.  Was admitted and converted to SR after Cardizem. Prior to that patient was diagnosed with new hyperthyroidism.     Patient currently on Eliquis and EKG today showed Sinus bradycardia.   She is compliant with medication and feels well today.   No dizziness, no tachy palpitations.         Prior cardiovascular  Hx  --------------------------------       · ECHO 2019 Normal left ventricular systolic function. The estimated ejection fraction is 65%  · Normal LV diastolic function.  · No wall motion abnormalities.  · Mild aortic regurgitation.  · Normal right ventricular systolic function.  · Normal central venous pressure (3 mm Hg).  The estimated PA systolic pressure is 36 mm Hg    - EKG sinus sophie, lAE      Patient Active Problem List    Diagnosis Date Noted    Nonrheumatic aortic valve insufficiency 2020    New onset a-fib 2019    Paroxysmal A-fib 2019    Hyperthyroidism 2019    Essential hypertension 2019    Polyuria 2/2 hyperthyroidism 2019    Normocytic anemia 2019    Pruritus 2019    Atrial fibrillation with RVR 2019           Left Arm BP - Sittin/68        LAST HbA1c  No results found for: HGBA1C    Lipid panel  Lab Results   Component Value Date    CHOL 169 2020     Lab Results   Component Value Date    HDL 43 2020     Lab Results   Component Value Date    LDLCALC 107.2 2020     Lab Results   Component Value Date    TRIG 94 2020     Lab Results   Component Value Date    CHOLHDL 25.4 2020            Review of Systems   Constitution: Negative for chills and fever.   HENT: Negative for hearing loss and nosebleeds.    Eyes: Negative for blurred vision.   Cardiovascular: Negative for  chest pain and palpitations.   Respiratory: Negative for hemoptysis and shortness of breath.    Hematologic/Lymphatic: Negative for bleeding problem.   Skin: Negative for itching.   Musculoskeletal: Negative for falls.   Gastrointestinal: Negative for abdominal pain and hematochezia.   Genitourinary: Negative for hematuria.   Neurological: Negative for dizziness and loss of balance.   Psychiatric/Behavioral: Negative for altered mental status and depression.       Objective:   Physical Exam   Constitutional: She is oriented to person, place, and time. She appears well-developed and well-nourished.   HENT:   Head: Normocephalic and atraumatic.   Eyes: Conjunctivae are normal.   Neck: Neck supple. Carotid bruit is not present.   Cardiovascular: Normal rate, regular rhythm and normal heart sounds. Exam reveals no gallop and no friction rub.   No murmur heard.  Pulmonary/Chest: Effort normal and breath sounds normal. No stridor. No respiratory distress. She has no wheezes.   Neurological: She is alert and oriented to person, place, and time.   Skin: Skin is warm and dry.   Psychiatric: She has a normal mood and affect. Her behavior is normal.       Assessment:     1. PAF (paroxysmal atrial fibrillation)    2. Thyrotoxicosis without thyroid storm, unspecified thyrotoxicosis type    3. Essential hypertension    4. Hyperthyroidism    5. Nonrheumatic aortic valve insufficiency        Plan:     - Currently remains in SR  - CHADVASC is at least 2. Continue NOAC  - ? Could be triggered by hyperthyroidism. After stabilization of thyroid function will consider EM and if no recurrent will consider d/c OAC  - Monitor mild AI  - Check lipid profile    Pertinent cardiac images and EKG reviewed independently.    Continue with current medical plan and lifestyle changes.  Return sooner for concerns or questions. If symptoms persist go to the ED  I have reviewed all pertinent data including patient's medical history in detail and  updated the computerized patient record.     Orders Placed This Encounter   Procedures    Lipid panel     fasting     Standing Status:   Future     Number of Occurrences:   1     Standing Expiration Date:   1/8/2021    IN OFFICE EKG 12-LEAD (to Muse)     Order Specific Question:   Diagnosis     Answer:   Atrial fibrillation [427.31.ICD-9-CM]       Follow up as scheduled.     She expressed verbal understanding and agreed with the plan    Patient's Medications   New Prescriptions    No medications on file   Previous Medications    AMLODIPINE-BENAZEPRIL 10-20MG (LOTREL) 10-20 MG PER CAPSULE    Take 1 capsule by mouth once daily.    APIXABAN (ELIQUIS) 5 MG TAB    Take 1 tablet (5 mg total) by mouth 2 (two) times daily.    ASPIRIN 81 MG CHEW    Take 81 mg by mouth once daily.    BETAMETHASONE DIPROPIONATE (DIPROLENE) 0.05 % OINTMENT    Apply topically 2 (two) times daily as directed.    METHIMAZOLE (TAPAZOLE) 5 MG TAB    Take 5 mg by mouth 3 (three) times daily.    OXYBUTYNIN (DITROPAN) 5 MG TAB    Take 5 mg by mouth 3 (three) times daily.    PROPRANOLOL (INDERAL) 10 MG TABLET    Take 3 tablets (30 mg total) by mouth 3 (three) times daily.   Modified Medications    No medications on file   Discontinued Medications    No medications on file

## 2020-01-08 NOTE — TELEPHONE ENCOUNTER
I left a v/m for patient letting her know, lipid level is borderline ok. No need to start medication. Just dietary changes with low fat diet recommended. I asked her to call us with any questions

## 2020-01-08 NOTE — PROGRESS NOTES
Please let her know, lipid level is borderline ok. No need to start medication. Just dietary changes with low fat diet recommended. Thanks

## 2020-01-08 NOTE — PATIENT INSTRUCTIONS
Aerobic Exercise for a Healthy Heart  Exercise is a lot more than an energy booster and a stress reliever. It also strengthens your heart muscle, lowers your blood pressure and cholesterol, and burns calories. It can also improve your resting muscle tone, and your mood.     Remember, some activity is better than none.    Choose an aerobic activity  Choose an activity that makes your heart and lungs work harder than they do when you rest or walk normally. This aerobic exercise can improve the way your heart and other muscles use oxygen. Make it fun by exercising with a friend and choosing an activity you enjoy. Here are some ideas:  · Walking  · Swimming  · Bicycling  · Stair climbing  · Dancing  · Jogging  · Gardening  Exercise regularly  If you havent been exercising regularly,  get your doctors OK first. Then start slowly.  Here are some tips:  · Begin exercising 3 times a week for 5 to 10 minutes at a time.  · When you feel comfortable, add a few minutes each session.  · Slowly build up to exercising 3 to 4 times each week. Each session should last for 40 minutes, on average, and involve moderate- to vigorous-intensity physical activity.  · If you have been given nitroglycerin, be sure to carry it when you exercise.  · If you get chest pain (angina) when youre exercising, stop what youre doing, take your nitroglycerin, and call your doctor.  Date Last Reviewed: 6/2/2016  © 5414-1163 [employer]. [address] [place] [address]. All rights reserved. This information is not intended as a substitute for professional medical care. Always follow your healthcare professional's instructions.          Eating Heart-Healthy Foods  Eating has a big impact on your heart health. In fact, eating healthier can improve several of your heart risks at once. For instance, it helps you manage weight, cholesterol, and blood pressure. Here are ideas to help you make heart-healthy changes without giving up  all the foods and flavors you love.  Getting started  · Talk with your health care provider about eating plans, such as the DASH or Mediterranean diet. You may also be referred to a dietitian.  · Change a few things at a time. Give yourself time to get used to a few eating changes before adding more.  · Work to create a tasty, healthy eating plan that you can stick to for the rest of your life.    Goals for healthy eating  Below are some tips to improve your eating habits:  · Limit saturated fats and trans fats. Saturated fats raise your levels of cholesterol, so keep these fats to a minimum. They are found in foods such as fatty meats, whole milk, cheese, and palm and coconut oils. Avoid trans fats because they lower good cholesterol as well as raise bad cholesterol. Trans fats are most often found in processed foods.  · Reduce sodium (salt) intake. Eating too much salt may increase your blood pressure. Limit your sodium intake to 2,300 milligrams (mg) per day, or less if your health care provider recommends it. Dining out less often and eating fewer processed foods are two great ways to decrease the amount of salt you consume.  · Managing calories. A calorie is a unit of energy. Your body burns calories for fuel, but if you eat more calories than your body burns, the extras are stored as fat. Your health care provider can help you create a diet plan to manage your calories. This will likely include eating healthier foods as well as exercising regularly. To help you track your progress, keep a diary to record what you eat and how often you exercise.  Choose the right foods  Aim to make these foods staples of your diet. If you have diabetes, you may have different recommendations than what is listed here:  · Fruits and vegetable provide plenty of nutrients without a lot of calories. At meals, fill half your plate with these foods. Split the other half of your plate between whole grains and lean protein.  · Whole  grains are high in fiber and rich in vitamins and nutrients. Good choices include whole-wheat bread, pasta, and brown rice.  · Lean proteins give you nutrition with less fat. Good choices include fish, skinless chicken, and beans.  · Low-fat or nonfat dairy provides nutrients without a lot of fat. Try low-fat or nonfat milk, cheese, or yogurt.  · Healthy fats can be good for you in small amounts. These are unsaturated fats, such as olive oil, nuts, and fish. Try to have at least 2 servings per week of fatty fish such as salmon, sardines, mackerel, rainbow trout, and albacore tuna. These contain omega-3 fatty acids, which are good for your heart. Flaxseed is another source of a heart-healthy fat.  More on heart healthy eating    Read food labels  Healthy eating starts at the grocery store. Be sure to pay attention to food labels on packaged foods. Look for products that are high in fiber and protein, and low in saturated fat, cholesterol, and sodium. Avoid products that contain trans fat. And pay close attention to serving size. For instance, if you plan to eat two servings, double all the numbers on the label.  Prepare food right  A key part of healthy cooking is cutting down on added fat and salt. Look on the internet for lower-fat, lower-sodium recipes. Also, try these tips:  · Remove fat from meat and skin from poultry before cooking.  · Skim fat from the surface of soups and sauces.  · Broil, boil, bake, steam, grill, and microwave food without added fats.  · Choose ingredients that spice up your food without adding calories, fat, or sodium. Try these items: horseradish, hot sauce, lemon, mustard, nonfat salad dressings, and vinegar. For salt-free herbs and spices, try basil, cilantro, cinnamon, pepper, and rosemary.  Date Last Reviewed: 6/25/2015  © 9192-9404 [employer]. [address] [place] [address], [place] [address]. All rights reserved. This information is not intended as a substitute for  professional medical care. Always follow your healthcare professional's instructions.

## 2020-01-30 ENCOUNTER — HOSPITAL ENCOUNTER (OUTPATIENT)
Dept: RADIOLOGY | Facility: HOSPITAL | Age: 70
Discharge: HOME | End: 2020-01-30
Attending: SPECIALIST
Payer: MEDICARE

## 2020-01-30 DIAGNOSIS — E05.90 THYROTOXICOSIS: ICD-10-CM

## 2020-01-30 PROCEDURE — 78014 THYROID IMAGING W/BLOOD FLOW: CPT | Mod: 26,,, | Performed by: RADIOLOGY

## 2020-01-30 PROCEDURE — 78014 NM THYROID UPTAKE AND SCAN: ICD-10-PCS | Mod: 26,,, | Performed by: RADIOLOGY

## 2020-01-30 PROCEDURE — A9516 IODINE I-123 SOD IODIDE MIC: HCPCS

## 2020-01-31 ENCOUNTER — HOSPITAL ENCOUNTER (OUTPATIENT)
Dept: RADIOLOGY | Facility: HOSPITAL | Age: 70
Discharge: HOME | End: 2020-01-31
Attending: SPECIALIST
Payer: MEDICARE

## 2020-01-31 DIAGNOSIS — R22.41 LOCALIZED SWELLING, MASS, OR LUMP OF LOWER EXTREMITY, RIGHT: ICD-10-CM

## 2020-01-31 PROCEDURE — 93971 US LOWER EXTREMITY VEINS RIGHT: ICD-10-PCS | Mod: 26,RT,, | Performed by: RADIOLOGY

## 2020-01-31 PROCEDURE — 93971 EXTREMITY STUDY: CPT | Mod: TC,RT

## 2020-01-31 PROCEDURE — 93971 EXTREMITY STUDY: CPT | Mod: 26,RT,, | Performed by: RADIOLOGY

## 2020-03-11 ENCOUNTER — TELEPHONE (OUTPATIENT)
Dept: NEUROLOGY | Facility: CLINIC | Age: 70
End: 2020-03-11

## 2020-03-11 NOTE — TELEPHONE ENCOUNTER
----- Message from Dax Eaton sent at 3/11/2020  3:37 PM CDT -----  Pt is being referred to neurology for memory issues. I have scanned the referral/records into media mgr within Epic. Please review and contact for scheduling,thanks

## 2020-03-18 ENCOUNTER — TELEPHONE (OUTPATIENT)
Dept: ENDOCRINOLOGY | Facility: CLINIC | Age: 70
End: 2020-03-18

## 2020-03-18 NOTE — TELEPHONE ENCOUNTER
Spoke with pt regarding appt on 3/19/20. Patient says she would rather cancel appt and follow up with a provider that is closer to home

## 2020-03-31 ENCOUNTER — TELEPHONE (OUTPATIENT)
Dept: CARDIOLOGY | Facility: CLINIC | Age: 70
End: 2020-03-31

## 2020-03-31 NOTE — TELEPHONE ENCOUNTER
Dr. Mujica,   Flor Erwin Mrn [id number] states that she would like to see you at her scheduled appointment 4/14 in [place] at 1040am because she isnt familar with the video appointment and has no one to help her,  she says that she needs to be checked out by her heart doctor.  she isnt having any issues but feels she needs to see you.  Please advise

## 2020-03-31 NOTE — TELEPHONE ENCOUNTER
----- Message from Avril Eaton MA sent at 3/31/2020  4:00 PM CDT -----  Contact: [phone]/self      ----- Message -----  From: Danae Colón  Sent: 3/31/2020   3:53 PM CDT  To: [facility] Staff    Type:  Patient Returning Call    Who Called:PATIENT   Who Left Message for Patient:TOUSSAINT, ADRIANE  Does the patient know what this is regarding?:YES  Would the patient rather a call back or a response via [facility]? CALLBACK   Best Call Back Number:[phone]  Additional Information: NONE

## 2020-04-08 ENCOUNTER — TELEPHONE (OUTPATIENT)
Dept: CARDIOLOGY | Facility: CLINIC | Age: 70
End: 2020-04-08

## 2020-04-08 NOTE — TELEPHONE ENCOUNTER
----- Message from Lennox Barr sent at 4/8/2020 10:49 AM CDT -----  Contact: [phone] / self   Patient would like to speak with your office regarding an same day appt , pt refuses to explain why she needs to be seen. Please Advise.

## 2020-04-08 NOTE — TELEPHONE ENCOUNTER
Patient called to see if she could change her appointment to today.  Patient notified that the doctor isn't in clinic today.

## 2020-04-13 ENCOUNTER — TELEPHONE (OUTPATIENT)
Dept: CARDIOLOGY | Facility: CLINIC | Age: 70
End: 2020-04-13

## 2020-04-13 NOTE — TELEPHONE ENCOUNTER
Reached out to pt in regards to rescheduling clinical visit with Dr. Mujica due to COVID-19 concern     Informed the pt that if she is not having any urgent symptoms, we highly advise that she hold off on coming in to the clinic to avoid being put at risk unless absolutely necessary    Pt states she is not having any symptoms of SOB, chest pain, chest tightness and can hold off from being seen in the clinic     Scheduled the pt for an audio visit with Dr. Mujica tomorrow, pt states she does not understand how to work MyChart

## 2020-04-14 ENCOUNTER — OFFICE VISIT (OUTPATIENT)
Dept: CARDIOLOGY | Facility: CLINIC | Age: 70
End: 2020-04-14
Payer: MEDICARE

## 2020-04-14 DIAGNOSIS — I35.1 NONRHEUMATIC AORTIC VALVE INSUFFICIENCY: ICD-10-CM

## 2020-04-14 DIAGNOSIS — E05.90 HYPERTHYROIDISM: ICD-10-CM

## 2020-04-14 DIAGNOSIS — I48.0 PAROXYSMAL A-FIB: Primary | ICD-10-CM

## 2020-04-14 PROCEDURE — 99442 PR PHYSICIAN TELEPHONE EVALUATION 11-20 MIN: ICD-10-PCS | Mod: 95,,, | Performed by: INTERNAL MEDICINE

## 2020-04-14 PROCEDURE — 99442 PR PHYSICIAN TELEPHONE EVALUATION 11-20 MIN: CPT | Mod: 95,,, | Performed by: INTERNAL MEDICINE

## 2020-04-14 NOTE — PROGRESS NOTES
The patient location is: [place]  The chief complaint leading to consultation is: PAF   Visit type: Virtual visit (audio only due to technical difficulties)   Total time spent with patient: 15 minutes  Each patient to whom he or she provides medical services by telemedicine is:  (1) informed of the relationship between the physician and patient and the respective role of any other health care provider with respect to management of the patient; and (2) notified that he or she may decline to receive medical services by telemedicine and may withdraw from such care at any time.    Notes: As below  Subjective:   @Patient ID:  Flor Erwin is a 69 y.o. female who presents for evaluation of Atrial fibrillation      HPI:    Follow up  This is a virtual visit due to COVID-19 pandemic   Patient stated that she is doing ok, no chest ain, no palpitations  Main issue is the hyperthyroidism that causing a lot of her symptoms. Reports ALFREDO.   She was taken off Eliquis, she doesn't know why. She didn't have any bleeding issues at all.     Historically:    Patient was hospitalized 12/25 with A.fib with RVR which was a new onset.  Was admitted and converted to SR after Cardizem. Prior to that patient was diagnosed with new hyperthyroidism.       Prior cardiovascular  Hx  --------------------------------       · ECHO 12/2019 Normal left ventricular systolic function. The estimated ejection fraction is 65%  · Normal LV diastolic function.  · No wall motion abnormalities.  · Mild aortic regurgitation.  · Normal right ventricular systolic function.  · Normal central venous pressure (3 mm Hg).  The estimated PA systolic pressure is 36 mm Hg    - EKG sinus sophie, lAE      Patient Active Problem List    Diagnosis Date Noted    Nonrheumatic aortic valve insufficiency 01/08/2020    New onset a-fib 12/26/2019    Paroxysmal A-fib 12/26/2019    Hyperthyroidism 12/26/2019    Essential hypertension 12/26/2019    Polyuria 2/2  hyperthyroidism 12/26/2019    Normocytic anemia 12/26/2019    Pruritus 12/26/2019    Atrial fibrillation with RVR 12/25/2019                    LAST HbA1c  No results found for: HGBA1C    Lipid panel  Lab Results   Component Value Date    CHOL 169 01/08/2020     Lab Results   Component Value Date    HDL 43 01/08/2020     Lab Results   Component Value Date    LDLCALC 107.2 01/08/2020     Lab Results   Component Value Date    TRIG 94 01/08/2020     Lab Results   Component Value Date    CHOLHDL 25.4 01/08/2020            Review of Systems   Constitution: Negative for chills and fever.   HENT: Negative for hearing loss and nosebleeds.    Eyes: Negative for blurred vision.   Cardiovascular: Positive for dyspnea on exertion. Negative for chest pain and palpitations.   Respiratory: Negative for hemoptysis and shortness of breath.    Hematologic/Lymphatic: Negative for bleeding problem.   Skin: Negative for itching.   Musculoskeletal: Negative for falls.   Gastrointestinal: Negative for abdominal pain and hematochezia.   Genitourinary: Negative for hematuria.   Neurological: Negative for dizziness and loss of balance.   Psychiatric/Behavioral: Negative for altered mental status and depression.       Objective:   Physical Exam   Unable to perform. This is Audio visit due to COVID-19 pandemic      Assessment:     1. Paroxysmal A-fib    2. Nonrheumatic aortic valve insufficiency    3. Hyperthyroidism        Plan:       - CHADVASC is at least 2. Continue NOAC for time being. After stabilization of thyroid status will check Event Monitor ( 30 days) and if no recurrence then will d/c OAC  - Monitor mild AI      Pertinent cardiac images and EKG reviewed independently.    Continue with current medical plan and lifestyle changes.  Return sooner for concerns or questions. If symptoms persist go to the ED  I have reviewed all pertinent data including patient's medical history in detail and updated the computerized patient record.      Greater than 50% of the visit of 15 minutes was spent counseling, educating, and coordinating the care of the patient.  Orders Placed This Encounter   Procedures    Cardiac event monitor     Standing Status:   Future     Standing Expiration Date:   4/14/2021     Order Specific Question:   Cardiac Event Monitor     Answer:   Auto Trigger       Follow up as scheduled.     She expressed verbal understanding and agreed with the plan    Patient's Medications   New Prescriptions    No medications on file   Previous Medications    AMLODIPINE-BENAZEPRIL 10-20MG (LOTREL) 10-20 MG PER CAPSULE    Take 1 capsule by mouth once daily.    ASPIRIN 81 MG CHEW    Take 81 mg by mouth once daily.    BETAMETHASONE DIPROPIONATE (DIPROLENE) 0.05 % OINTMENT    Apply topically 2 (two) times daily as directed to affected area    METHIMAZOLE (TAPAZOLE) 5 MG TAB    Take 5 mg by mouth 3 (three) times daily.    OXYBUTYNIN (DITROPAN) 5 MG TAB    Take 5 mg by mouth 3 (three) times daily.    PROPRANOLOL (INDERAL) 10 MG TABLET    Take 3 tablets (30 mg total) by mouth 3 (three) times daily.   Modified Medications    Modified Medication Previous Medication    APIXABAN (ELIQUIS) 5 MG TAB apixaban (ELIQUIS) 5 mg Tab       Take 1 tablet (5 mg total) by mouth 2 (two) times daily.    Take 1 tablet (5 mg total) by mouth 2 (two) times daily.   Discontinued Medications    No medications on file

## 2020-04-15 ENCOUNTER — TELEPHONE (OUTPATIENT)
Dept: CARDIOLOGY | Facility: CLINIC | Age: 70
End: 2020-04-15

## 2020-04-15 NOTE — TELEPHONE ENCOUNTER
----- Message from Chema Mujica MD sent at 4/14/2020 10:44 AM CDT -----  Please schedule event monitor after 2 months and follow up visit after 3 months. Thanks

## 2020-05-08 DIAGNOSIS — I48.91 ATRIAL FIBRILLATION WITH RVR: Primary | ICD-10-CM

## 2020-05-13 ENCOUNTER — OFFICE VISIT (OUTPATIENT)
Dept: OPHTHALMOLOGY | Facility: CLINIC | Age: 70
End: 2020-05-13
Payer: MEDICARE

## 2020-05-13 ENCOUNTER — APPOINTMENT (OUTPATIENT)
Dept: OPHTHALMOLOGY | Facility: CLINIC | Age: 70
End: 2020-05-13
Payer: MEDICARE

## 2020-05-13 DIAGNOSIS — H52.7 REFRACTIVE ERROR: ICD-10-CM

## 2020-05-13 DIAGNOSIS — H04.123 DRY EYE SYNDROME OF BOTH EYES: ICD-10-CM

## 2020-05-13 DIAGNOSIS — H25.13 NUCLEAR SCLEROSIS OF BOTH EYES: ICD-10-CM

## 2020-05-13 DIAGNOSIS — H26.9 CORTICAL CATARACT OF BOTH EYES: ICD-10-CM

## 2020-05-13 DIAGNOSIS — H57.89 THYROID EYE DISEASE: Primary | ICD-10-CM

## 2020-05-13 DIAGNOSIS — E05.90 HYPERTHYROIDISM: ICD-10-CM

## 2020-05-13 DIAGNOSIS — E07.9 THYROID EYE DISEASE: Primary | ICD-10-CM

## 2020-05-13 PROCEDURE — 92004 COMPRE OPH EXAM NEW PT 1/>: CPT | Mod: S$PBB,,, | Performed by: OPHTHALMOLOGY

## 2020-05-13 PROCEDURE — 99212 OFFICE O/P EST SF 10 MIN: CPT | Mod: PBBFAC,PO | Performed by: OPHTHALMOLOGY

## 2020-05-13 PROCEDURE — 92004 PR EYE EXAM, NEW PATIENT,COMPREHESV: ICD-10-PCS | Mod: S$PBB,,, | Performed by: OPHTHALMOLOGY

## 2020-05-13 PROCEDURE — 99999 PR PBB SHADOW E&M-EST. PATIENT-LVL II: ICD-10-PCS | Mod: PBBFAC,,, | Performed by: OPHTHALMOLOGY

## 2020-05-13 PROCEDURE — 99999 PR PBB SHADOW E&M-EST. PATIENT-LVL II: CPT | Mod: PBBFAC,,, | Performed by: OPHTHALMOLOGY

## 2020-05-13 NOTE — LETTER
May 13, 2020      Shruthi St MD  [address] [facility]  [place]  [place] [address]           [facility]  [address] [facility].  [place] [address]  Phone: [phone]  Fax: [phone]          Patient: Flor Erwin   MR Number: [id number]   YOB: 1950   Date of Visit: 5/13/2020       Dear Dr. Shruthi St:    Thank you for referring Flor Erwin to me for evaluation. Attached you will find relevant portions of my assessment and plan of care.    If you have questions, please do not hesitate to call me. I look forward to following Flor Erwin along with you.    Sincerely,    Marko Forde MD    Enclosure  CC:  No Recipients    If you would like to receive this communication electronically, please contact [email or web address] or [phone] to request more information on [employer] Link access.    For providers and/or their staff who would like to refer a patient to [facility], please contact us through our one-stop-shop provider referral line, [facility], at [phone].    If you feel you have received this communication in error or would no longer like to receive these types of communications, please e-mail [email or web address]

## 2020-05-13 NOTE — PROGRESS NOTES
Subjective:       Patient ID: Flor Erwin is a 69 y.o. female.    Chief Complaint: Concerns About Ocular Health    HPI     Referred: Dr. Orellana    70 y/o female is here for poss Graves Orbitopathy consult. H/o of   Hyperthyroidism. Pt states last eye exam was X 1 month ago, was referred   to see an Ophthalmologist for swollen eyelids and redness. Pt is currently   being treated with an prescribed eyedrop- name unknown for redness. Pt c/o   of excessive tearing today. Pt denies floater, flashes, and vision change.   Pt denies any ocular sx and procedures. Pt have update MRX in the car- 1   month old.     Last edited by Marko Forde MD on 5/13/2020  2:31 PM. (History)               Assessment:       1. Thyroid eye disease    2. Hyperthyroidism    3. Dry eye syndrome of both eyes    4. Nuclear sclerosis of both eyes    5. Cortical cataract of both eyes    6. Refractive error        Plan:       DAMI-Pt with exophthalmos, lid edema, injection, lagophthalmos OU-Pt wants to be considered for Tapezza.  Hyperthyroidism s/p RAIA (4/22/2020) per Dr Sidhu-Pt is still on Methimazole 5 mg qd.     ELISABETH-Needs AT's & AT gel.  Cataracts- Not visually significant per Pt.  RE-Pt just got new Rx but hasn't had it filled yet.    Start AT's bid-tid OU & AT gel qhs OU.  Refer to Dr Woodall for eval/tx with Tapezza.

## 2020-06-10 ENCOUNTER — HOSPITAL ENCOUNTER (OUTPATIENT)
Dept: RADIOLOGY | Facility: HOSPITAL | Age: 70
Discharge: HOME | End: 2020-06-10
Attending: SPECIALIST
Payer: MEDICARE

## 2020-06-10 DIAGNOSIS — Z12.31 SCREENING MAMMOGRAM, ENCOUNTER FOR: ICD-10-CM

## 2020-06-10 PROCEDURE — 77067 SCR MAMMO BI INCL CAD: CPT | Mod: TC

## 2020-06-10 PROCEDURE — 77067 SCR MAMMO BI INCL CAD: CPT | Mod: 26,,, | Performed by: RADIOLOGY

## 2020-06-10 PROCEDURE — 77067 MAMMO DIGITAL SCREENING BILAT WITH CAD: ICD-10-PCS | Mod: 26,,, | Performed by: RADIOLOGY

## 2020-06-15 ENCOUNTER — CLINICAL SUPPORT (OUTPATIENT)
Dept: CARDIOLOGY | Facility: HOSPITAL | Age: 70
End: 2020-06-15
Attending: INTERNAL MEDICINE
Payer: MEDICARE

## 2020-06-15 DIAGNOSIS — I48.0 PAROXYSMAL A-FIB: ICD-10-CM

## 2020-06-15 PROCEDURE — 93272 ECG/REVIEW INTERPRET ONLY: CPT | Mod: ,,, | Performed by: INTERNAL MEDICINE

## 2020-06-15 PROCEDURE — 93272 CARDIAC EVENT MONITOR (CUPID ONLY): ICD-10-PCS | Mod: ,,, | Performed by: INTERNAL MEDICINE

## 2020-06-15 PROCEDURE — 93271 ECG/MONITORING AND ANALYSIS: CPT

## 2020-07-24 ENCOUNTER — TELEPHONE (OUTPATIENT)
Dept: ORTHOPEDICS | Facility: CLINIC | Age: 70
End: 2020-07-24

## 2020-07-27 ENCOUNTER — TELEPHONE (OUTPATIENT)
Dept: ORTHOPEDICS | Facility: CLINIC | Age: 70
End: 2020-07-27

## 2020-07-27 DIAGNOSIS — R52 PAIN: Primary | ICD-10-CM

## 2020-07-27 NOTE — PROGRESS NOTES
Notes: As below  Subjective:   @Patient ID:  Flor Erwin is a 70 y.o. female who presents for evaluation of Atrial fibrillation      HPI:    Follow up  She stated that she has been doing ok since last visit  No chest pain, no palpitation  She ran out of Eliquis  No VCA, no TIA  She couldn't complete the event monitor due to allergic reaction to the leads.          Main issue is the hyperthyroidism that causing a lot of her symptoms.      Historically:    Patient was hospitalized 12/25 with A.fib with RVR which was a new onset.  Was admitted and converted to SR after Cardizem. Prior to that patient was diagnosed with new hyperthyroidism.     Event  Monitor 7/1/2020 ( Patient didn't complete it)    · Negative event monitor with no clinical arrhythmias.           Symptoms corresponding with normal sinus rhythm.      Prior cardiovascular  Hx  --------------------------------       · ECHO 12/2019 Normal left ventricular systolic function. The estimated ejection fraction is 65%  · Normal LV diastolic function.  · No wall motion abnormalities.  · Mild aortic regurgitation.  · Normal right ventricular systolic function.  · Normal central venous pressure (3 mm Hg).  The estimated PA systolic pressure is 36 mm Hg    - EKG sinus sophie, lAE      Patient Active Problem List    Diagnosis Date Noted    Thyroid eye disease 05/13/2020    Refractive error 05/13/2020    Cortical cataract of both eyes 05/13/2020    Nuclear sclerosis of both eyes 05/13/2020    Dry eye syndrome of both eyes 05/13/2020    Nonrheumatic aortic valve insufficiency 01/08/2020    New onset a-fib 12/26/2019    Paroxysmal A-fib 12/26/2019    Hyperthyroidism 12/26/2019    Essential hypertension 12/26/2019    Polyuria 2/2 hyperthyroidism 12/26/2019    Normocytic anemia 12/26/2019    Pruritus 12/26/2019    Atrial fibrillation with RVR 12/25/2019                    LAST HbA1c  No results found for: HGBA1C    Lipid panel  Lab Results   Component  Value Date    CHOL 169 01/08/2020     Lab Results   Component Value Date    HDL 43 01/08/2020     Lab Results   Component Value Date    LDLCALC 107.2 01/08/2020     Lab Results   Component Value Date    TRIG 94 01/08/2020     Lab Results   Component Value Date    CHOLHDL 25.4 01/08/2020            Review of Systems   Constitution: Negative for chills and fever.   HENT: Negative for hearing loss and nosebleeds.    Eyes: Negative for blurred vision.   Cardiovascular: Positive for dyspnea on exertion. Negative for chest pain and palpitations.   Respiratory: Negative for hemoptysis and shortness of breath.    Hematologic/Lymphatic: Negative for bleeding problem.   Skin: Negative for itching.   Musculoskeletal: Negative for falls.   Gastrointestinal: Negative for abdominal pain and hematochezia.   Genitourinary: Negative for hematuria.   Neurological: Negative for dizziness and loss of balance.   Psychiatric/Behavioral: Negative for altered mental status and depression.       Objective:   Physical Exam   Constitutional: She is oriented to person, place, and time. She appears well-developed and well-nourished.   HENT:   Head: Normocephalic and atraumatic.   Eyes:   Conjunctival hyperemia, with exopthalmos   Neck: Neck supple. No JVD present.   Cardiovascular: Normal rate, regular rhythm and normal heart sounds. Exam reveals no gallop and no friction rub.   No murmur heard.  Pulmonary/Chest: Effort normal and breath sounds normal. No stridor. No respiratory distress. She has no wheezes.   Neurological: She is alert and oriented to person, place, and time.   Skin: Skin is warm and dry.   Psychiatric: She has a normal mood and affect. Her behavior is normal.          Assessment:     1. Paroxysmal A-fib    2. Atrial fibrillation with RVR    3. Essential hypertension    4. Nonrheumatic aortic valve insufficiency    5. Hyperthyroidism        Plan:       - CHADVASC is at least 2. Continue NOAC   - I have discussed risk and  benefits of OAC. Given sig risk for recurrence especially in the presence of underlying thyroid disease.   - Will monitor for mild AI        Pertinent cardiac images and EKG reviewed independently.    Continue with current medical plan and lifestyle changes.  Return sooner for concerns or questions. If symptoms persist go to the ED  I have reviewed all pertinent data including patient's medical history in detail and updated the computerized patient record.         Follow up as scheduled.     She expressed verbal understanding and agreed with the plan    Patient's Medications   New Prescriptions    No medications on file   Previous Medications    AMLODIPINE-BENAZEPRIL 10-20MG (LOTREL) 10-20 MG PER CAPSULE    Take 1 capsule by mouth once daily.    ASPIRIN 81 MG CHEW    Take 81 mg by mouth once daily.    BETAMETHASONE DIPROPIONATE (DIPROLENE) 0.05 % OINTMENT    Apply topically 2 (two) times daily as directed to affected area    OLOPATADINE (PATANOL) 0.1 % OPHTHALMIC SOLUTION    olopatadine 0.1 % eye drops   INSTILL 1 DROP INTO AFFECTED EYE(S) TWICE DAILY AT INTERVAL OF 6 TO 8 HOURS    OXYBUTYNIN (DITROPAN) 5 MG TAB    Take 5 mg by mouth 3 (three) times daily.    PROPRANOLOL (INDERAL) 10 MG TABLET    Take 3 tablets (30 mg total) by mouth 3 (three) times daily.   Modified Medications    Modified Medication Previous Medication    APIXABAN (ELIQUIS) 5 MG TAB apixaban (ELIQUIS) 5 mg Tab       Take 1 tablet (5 mg total) by mouth 2 (two) times daily.    Take 1 tablet (5 mg total) by mouth 2 (two) times daily.   Discontinued Medications    No medications on file

## 2020-07-28 ENCOUNTER — HOSPITAL ENCOUNTER (OUTPATIENT)
Dept: RADIOLOGY | Facility: HOSPITAL | Age: 70
Discharge: HOME | End: 2020-07-28
Attending: PHYSICIAN ASSISTANT
Payer: MEDICARE

## 2020-07-28 ENCOUNTER — OFFICE VISIT (OUTPATIENT)
Dept: CARDIOLOGY | Facility: CLINIC | Age: 70
End: 2020-07-28
Payer: MEDICARE

## 2020-07-28 ENCOUNTER — TELEPHONE (OUTPATIENT)
Dept: CARDIOLOGY | Facility: CLINIC | Age: 70
End: 2020-07-28

## 2020-07-28 ENCOUNTER — OFFICE VISIT (OUTPATIENT)
Dept: ORTHOPEDICS | Facility: CLINIC | Age: 70
End: 2020-07-28
Payer: MEDICARE

## 2020-07-28 VITALS
HEART RATE: 62 BPM | HEIGHT: 63 IN | WEIGHT: 147.06 LBS | DIASTOLIC BLOOD PRESSURE: 74 MMHG | BODY MASS INDEX: 26.06 KG/M2 | SYSTOLIC BLOOD PRESSURE: 118 MMHG | OXYGEN SATURATION: 100 %

## 2020-07-28 VITALS — BODY MASS INDEX: 27.79 KG/M2 | HEIGHT: 62 IN | WEIGHT: 151 LBS

## 2020-07-28 DIAGNOSIS — E05.90 HYPERTHYROIDISM: ICD-10-CM

## 2020-07-28 DIAGNOSIS — M79.644 FINGER PAIN, RIGHT: Primary | ICD-10-CM

## 2020-07-28 DIAGNOSIS — I48.0 PAROXYSMAL A-FIB: Primary | ICD-10-CM

## 2020-07-28 DIAGNOSIS — I10 ESSENTIAL HYPERTENSION: ICD-10-CM

## 2020-07-28 DIAGNOSIS — M79.644 FINGER PAIN, RIGHT: ICD-10-CM

## 2020-07-28 DIAGNOSIS — I48.91 ATRIAL FIBRILLATION WITH RVR: ICD-10-CM

## 2020-07-28 DIAGNOSIS — I35.1 NONRHEUMATIC AORTIC VALVE INSUFFICIENCY: ICD-10-CM

## 2020-07-28 DIAGNOSIS — R52 PAIN: ICD-10-CM

## 2020-07-28 DIAGNOSIS — M25.512 LEFT SHOULDER PAIN, UNSPECIFIED CHRONICITY: ICD-10-CM

## 2020-07-28 PROCEDURE — 99999 PR PBB SHADOW E&M-EST. PATIENT-LVL III: CPT | Mod: PBBFAC,,, | Performed by: INTERNAL MEDICINE

## 2020-07-28 PROCEDURE — 73030 X-RAY EXAM OF SHOULDER: CPT | Mod: TC,PN,LT

## 2020-07-28 PROCEDURE — 73030 XR SHOULDER COMPLETE 2 OR MORE VIEWS LEFT: ICD-10-PCS | Mod: 26,LT,, | Performed by: RADIOLOGY

## 2020-07-28 PROCEDURE — 99999 PR PBB SHADOW E&M-EST. PATIENT-LVL III: ICD-10-PCS | Mod: PBBFAC,,, | Performed by: PHYSICIAN ASSISTANT

## 2020-07-28 PROCEDURE — 99999 PR PBB SHADOW E&M-EST. PATIENT-LVL III: ICD-10-PCS | Mod: PBBFAC,,, | Performed by: INTERNAL MEDICINE

## 2020-07-28 PROCEDURE — 99999 PR PBB SHADOW E&M-EST. PATIENT-LVL III: CPT | Mod: PBBFAC,,, | Performed by: PHYSICIAN ASSISTANT

## 2020-07-28 PROCEDURE — 99213 OFFICE O/P EST LOW 20 MIN: CPT | Mod: PBBFAC,25,PO | Performed by: INTERNAL MEDICINE

## 2020-07-28 PROCEDURE — 73130 XR HAND COMPLETE 3 VIEW RIGHT: ICD-10-PCS | Mod: 26,RT,, | Performed by: RADIOLOGY

## 2020-07-28 PROCEDURE — 73130 X-RAY EXAM OF HAND: CPT | Mod: 26,RT,, | Performed by: RADIOLOGY

## 2020-07-28 PROCEDURE — 99202 PR OFFICE/OUTPT VISIT, NEW, LEVL II, 15-29 MIN: ICD-10-PCS | Mod: S$PBB,,, | Performed by: PHYSICIAN ASSISTANT

## 2020-07-28 PROCEDURE — 73130 X-RAY EXAM OF HAND: CPT | Mod: TC,PN,RT

## 2020-07-28 PROCEDURE — 73030 X-RAY EXAM OF SHOULDER: CPT | Mod: 26,LT,, | Performed by: RADIOLOGY

## 2020-07-28 PROCEDURE — 99213 OFFICE O/P EST LOW 20 MIN: CPT | Mod: PBBFAC,25,27,PN | Performed by: PHYSICIAN ASSISTANT

## 2020-07-28 PROCEDURE — 99214 OFFICE O/P EST MOD 30 MIN: CPT | Mod: S$PBB,,, | Performed by: INTERNAL MEDICINE

## 2020-07-28 PROCEDURE — 99214 PR OFFICE/OUTPT VISIT, EST, LEVL IV, 30-39 MIN: ICD-10-PCS | Mod: S$PBB,,, | Performed by: INTERNAL MEDICINE

## 2020-07-28 PROCEDURE — 99202 OFFICE O/P NEW SF 15 MIN: CPT | Mod: S$PBB,,, | Performed by: PHYSICIAN ASSISTANT

## 2020-07-28 RX ORDER — MELOXICAM 7.5 MG/1
7.5 TABLET ORAL DAILY
Qty: 30 TABLET | Refills: 2 | OUTPATIENT
Start: 2020-07-28 | End: 2021-03-07

## 2020-07-28 RX ORDER — OLOPATADINE HYDROCHLORIDE 1 MG/ML
SOLUTION/ DROPS OPHTHALMIC
COMMUNITY
Start: 2020-03-02 | End: 2021-03-09

## 2020-07-28 RX ORDER — DICLOFENAC SODIUM 10 MG/G
2 GEL TOPICAL DAILY
Qty: 1 TUBE | Refills: 2 | Status: SHIPPED | OUTPATIENT
Start: 2020-07-28 | End: 2021-03-09

## 2020-07-28 NOTE — LETTER
July 28, 2020      Shruthi St MD  [address] [facility]  [place]  [place] [address]           [facility]  [address]  [place] [address]  Phone: [phone]          Patient: Flor Erwin   MR Number: [id number]   YOB: 1950   Date of Visit: 7/28/2020       Dear Dr. Shruthi St:    Thank you for referring Flor Erwni to me for evaluation. Attached you will find relevant portions of my assessment and plan of care.    If you have questions, please do not hesitate to call me. I look forward to following Flor Erwin along with you.    Sincerely,    Eron Gandara PA-C    Enclosure  CC:  No Recipients    If you would like to receive this communication electronically, please contact [email or web address] or [phone] to request more information on [employer] Link access.    For providers and/or their staff who would like to refer a patient to [facility], please contact us through our one-stop-shop provider referral line, [facility], at [phone].    If you feel you have received this communication in error or would no longer like to receive these types of communications, please e-mail [email or web address]

## 2020-07-28 NOTE — TELEPHONE ENCOUNTER
Reached out to pt and notified her that [facility]    Verbalized understanding.

## 2020-07-28 NOTE — PATIENT INSTRUCTIONS

## 2020-07-28 NOTE — PROGRESS NOTES
Subjective:      Patient ID: Flor Erwin is a 70 y.o. female.    Chief Complaint: Pain of the Left Shoulder and Pain of the Right Hand      HPI: Flor Erwin this 70-year-old female in clinic today for evaluation left shoulder pain and right middle finger pain.  Patient has diffuse arthritis of the right hand, but states that the right middle finger PIP has been bothering her more as of recently.  She denies any history of trauma or injury.  She has not received any treatment for this condition.  Patient states that her left shoulder is also painful, especially when trying to sleep at night.  She is not able to move it through this full for range of motion right shoulder without pain.  No history of injury or trauma here either.  The treatment and.    Past Medical History:   Diagnosis Date    Hypertension        Current Outpatient Medications:     amlodipine-benazepril 10-20mg (LOTREL) 10-20 mg per capsule, Take 1 capsule by mouth once daily., Disp: , Rfl:     aspirin 81 MG Chew, Take 81 mg by mouth once daily., Disp: , Rfl:     betamethasone dipropionate (DIPROLENE) 0.05 % ointment, Apply topically 2 (two) times daily as directed to affected area, Disp: 45 g, Rfl: 1    olopatadine (PATANOL) 0.1 % ophthalmic solution, olopatadine 0.1 % eye drops  INSTILL 1 DROP INTO AFFECTED EYE(S) TWICE DAILY AT INTERVAL OF 6 TO 8 HOURS, Disp: , Rfl:     oxybutynin (DITROPAN) 5 MG Tab, Take 5 mg by mouth 3 (three) times daily., Disp: , Rfl:     propranolol (INDERAL) 10 MG tablet, Take 3 tablets (30 mg total) by mouth 3 (three) times daily., Disp: 270 tablet, Rfl: 3    apixaban (ELIQUIS) 5 mg Tab, Take 1 tablet (5 mg total) by mouth 2 (two) times daily., Disp: 180 tablet, Rfl: 3    diclofenac sodium (VOLTAREN) 1 % Gel, Apply 2 g topically once daily., Disp: 1 Tube, Rfl: 2    meloxicam (MOBIC) 7.5 MG tablet, Take 1 tablet (7.5 mg total) by mouth once daily., Disp: 30 tablet, Rfl: 2  Review of patient's  "allergies indicates:   Allergen Reactions    Penicillins Hives       Ht 5' 2" (1.575 m)   Wt 68.5 kg (151 lb)   LMP  (LMP Unknown)   BMI 27.62 kg/m²     ROS      Objective:    Ortho Exam   Right hand:  TTP noted to the PIP of the 3rd finger  Mild swelling noted  ROM is full  Sensation and pulses are intact    Left shoulder:  No TTP noted  No pain with internal/external rotation  ROM is limited secondary to pain  Positive impingement test  Negative cross body test    GEN: Well developed, well nourished female. AAOX3. No acute distress.   Normocephalic, atraumatic.   DEONTE  Breathing unlabored.  Mood and affect appropriate.        Assessment:     Imaging:  X-ray right hand shows diffuse arthritis, new acute fracture dislocation.  X-ray left shoulder is unremarkable        1. Finger pain, right    2. Left shoulder pain, unspecified chronicity          Plan:       I explained to the patient that we could try a corticosteroid injection of the left shoulder and a PIP of the 3rd right finger, but patient does not wish to try this at this time.  Therefore I recommended anti-inflammatories both oral and topical further treatment.  Patient will try this and follow up in 6 weeks for further evaluation    Orders Placed This Encounter    X-Ray Hand Complete Right    meloxicam (MOBIC) 7.5 MG tablet    diclofenac sodium (VOLTAREN) 1 % Gel     Follow up in about 6 weeks (around 9/8/2020).          "

## 2020-07-28 NOTE — TELEPHONE ENCOUNTER
----- Message from Madison Stevens sent at 7/28/2020  2:13 PM CDT -----  Type:  Needs Medical Advice    Who Called: pt  Advice Regarding: needs 7 pills of Eliquis sent to [facility]  Would the patient rather a call back or a response via [facility]? call  Best Call Back Number: [phone]  Additional Information: n/a

## 2020-09-10 ENCOUNTER — OFFICE VISIT (OUTPATIENT)
Dept: ORTHOPEDICS | Facility: CLINIC | Age: 70
End: 2020-09-10
Payer: MEDICARE

## 2020-09-10 VITALS — WEIGHT: 147.06 LBS | BODY MASS INDEX: 26.06 KG/M2 | HEIGHT: 63 IN

## 2020-09-10 DIAGNOSIS — M79.644 FINGER PAIN, RIGHT: ICD-10-CM

## 2020-09-10 DIAGNOSIS — M25.512 LEFT SHOULDER PAIN, UNSPECIFIED CHRONICITY: Primary | ICD-10-CM

## 2020-09-10 PROCEDURE — 20610 PR DRAIN/INJECT LARGE JOINT/BURSA: ICD-10-PCS | Mod: S$PBB,LT,, | Performed by: PHYSICIAN ASSISTANT

## 2020-09-10 PROCEDURE — 99213 OFFICE O/P EST LOW 20 MIN: CPT | Mod: S$PBB,25,, | Performed by: PHYSICIAN ASSISTANT

## 2020-09-10 PROCEDURE — 99999 PR PBB SHADOW E&M-EST. PATIENT-LVL III: CPT | Mod: PBBFAC,,, | Performed by: PHYSICIAN ASSISTANT

## 2020-09-10 PROCEDURE — 99213 PR OFFICE/OUTPT VISIT, EST, LEVL III, 20-29 MIN: ICD-10-PCS | Mod: S$PBB,25,, | Performed by: PHYSICIAN ASSISTANT

## 2020-09-10 PROCEDURE — 99213 OFFICE O/P EST LOW 20 MIN: CPT | Mod: PBBFAC,PN | Performed by: PHYSICIAN ASSISTANT

## 2020-09-10 PROCEDURE — 20610 DRAIN/INJ JOINT/BURSA W/O US: CPT | Mod: S$PBB,LT,, | Performed by: PHYSICIAN ASSISTANT

## 2020-09-10 PROCEDURE — 99999 PR PBB SHADOW E&M-EST. PATIENT-LVL III: ICD-10-PCS | Mod: PBBFAC,,, | Performed by: PHYSICIAN ASSISTANT

## 2020-09-10 RX ORDER — TRIAMCINOLONE ACETONIDE 40 MG/ML
40 INJECTION, SUSPENSION INTRA-ARTICULAR; INTRAMUSCULAR
Status: COMPLETED | OUTPATIENT
Start: 2020-09-10 | End: 2020-09-10

## 2020-09-10 RX ORDER — LEVOTHYROXINE SODIUM 100 UG/1
TABLET ORAL
COMMUNITY
Start: 2020-08-04 | End: 2020-12-03

## 2020-09-10 RX ORDER — TRIAMCINOLONE ACETONIDE 40 MG/ML
40 INJECTION, SUSPENSION INTRA-ARTICULAR; INTRAMUSCULAR
Status: DISCONTINUED | OUTPATIENT
Start: 2020-09-10 | End: 2020-09-10 | Stop reason: HOSPADM

## 2020-09-10 RX ORDER — TRAMADOL HYDROCHLORIDE 50 MG/1
50 TABLET ORAL NIGHTLY
COMMUNITY
Start: 2020-07-14 | End: 2021-04-14

## 2020-09-10 RX ADMIN — TRIAMCINOLONE ACETONIDE 40 MG: 40 INJECTION, SUSPENSION INTRA-ARTICULAR; INTRAMUSCULAR at 09:09

## 2020-09-10 NOTE — PROGRESS NOTES
Subjective:      Patient ID: Flor Erwin is a 70 y.o. female.    Chief Complaint: Pain of the Left Shoulder and Pain of the Right Hand      HPI: Flor Erwin this 70-year-old female in clinic today for follow-up of left shoulder pain and right 3rd finger PIP joint pain.  Patient was last seen 6 weeks ago for these problems at which time she was offered corticosteroid injections, but denied them. Patient would like to have corticosteroid injection of the left shoulder performed today.  Patient has been using Voltaren gel on the PIP of the her drain finger on reports improvement with that.    Past Medical History:   Diagnosis Date    Hypertension        Current Outpatient Medications:     amlodipine-benazepril 10-20mg (LOTREL) 10-20 mg per capsule, Take 1 capsule by mouth once daily., Disp: , Rfl:     apixaban (ELIQUIS) 5 mg Tab, Take 1 tablet (5 mg total) by mouth 2 (two) times daily., Disp: 180 tablet, Rfl: 3    aspirin 81 MG Chew, Take 81 mg by mouth once daily., Disp: , Rfl:     diclofenac sodium (VOLTAREN) 1 % Gel, Apply 2 g topically once daily., Disp: 1 Tube, Rfl: 2    EUTHYROX 100 mcg tablet, TAKE 1 TABLET BY MOUTH ONCE DAILY ON EMPTY STOMACH WITH WATER. WAIT 30 MINUTES BEORE FOOD OR OTHER MEDICATIONS. STOP DOSE OF 50 MCG., Disp: , Rfl:     meloxicam (MOBIC) 7.5 MG tablet, Take 1 tablet (7.5 mg total) by mouth once daily., Disp: 30 tablet, Rfl: 2    traMADoL (ULTRAM) 50 mg tablet, Take 50 mg by mouth nightly., Disp: , Rfl:     betamethasone dipropionate (DIPROLENE) 0.05 % ointment, Apply topically 2 (two) times daily as directed to affected area (Patient not taking: Reported on 9/10/2020), Disp: 45 g, Rfl: 1    olopatadine (PATANOL) 0.1 % ophthalmic solution, olopatadine 0.1 % eye drops  INSTILL 1 DROP INTO AFFECTED EYE(S) TWICE DAILY AT INTERVAL OF 6 TO 8 HOURS, Disp: , Rfl:     oxybutynin (DITROPAN) 5 MG Tab, Take 5 mg by mouth 3 (three) times daily., Disp: , Rfl:     propranolol  "(INDERAL) 10 MG tablet, Take 3 tablets (30 mg total) by mouth 3 (three) times daily. (Patient not taking: Reported on 9/10/2020), Disp: 270 tablet, Rfl: 3    Current Facility-Administered Medications:     triamcinolone acetonide injection 40 mg, 40 mg, Intra-articular, 1 time in Clinic/HOD, Eron Gandara PA-C  Review of patient's allergies indicates:   Allergen Reactions    Penicillins Hives       Ht 5' 3" (1.6 m)   Wt 66.7 kg (147 lb 0.8 oz)   LMP  (LMP Unknown)   BMI 26.05 kg/m²     ROS      Objective:    Ortho Exam   Right hand:  No TTP noted  Mild swelling at the PIP of the 3rd finger  ROM is full  Sensation and pulses are intact    Left shoulder:  No TTP noted  No pain with internal/external rotation  ROM is limited secondary to pain  Positive in the test  Negative cross-body test    GEN: Well developed, well nourished female. AAOX3. No acute distress.   Normocephalic, atraumatic.   DEONTE  Breathing unlabored.  Mood and affect appropriate.       Assessment:     Imaging:  No new imaging ordered today        1. Left shoulder pain, unspecified chronicity    2. Finger pain, right          Plan:       Recommended performed corticosteroid injection of the left shoulder.  Patient tolerated procedure well.  Recommended continue use of Mobic and Voltaren.  Patient will follow up in 6 weeks    Orders Placed This Encounter    Large Joint Aspiration/Injection    triamcinolone acetonide injection 40 mg     Follow up in about 6 weeks (around 10/22/2020).          "

## 2020-09-10 NOTE — PROCEDURES
Large Joint Aspiration/Injection    Date/Time: 9/10/2020 9:00 AM  Performed by: Eron Gandara PA-C  Authorized by: Eron Gandara PA-C     Medications:  40 mg triamcinolone acetonide 40 mg/mL     PROCEDURE NOTE:  I have explained the risks, benefits, and alternatives of the procedure in detail.  The patient voices understanding and all questions have been answered.  The patient agrees to proceed as planned, consents to injection. Pause for timeout. After a sterile prep of the skin performed in the normal fashion, the left shoulder is injected from the posterior approach using a 22 gauge needle with a combination of 2cc 1% lidocaine and 40 mg of kenalog. The patient is cautioned and immediate relief of pain is secondary to the local anesthetic and will be temporary.  After the anesthetic wears off there may be a increase in pain that may last for a few hours or a few days and they should use ice to help alleviate this flair up of pain.

## 2020-10-05 ENCOUNTER — LAB VISIT (OUTPATIENT)
Dept: LAB | Facility: HOSPITAL | Age: 70
End: 2020-10-05
Attending: PSYCHIATRY & NEUROLOGY
Payer: MEDICARE

## 2020-10-05 ENCOUNTER — OFFICE VISIT (OUTPATIENT)
Dept: NEUROLOGY | Facility: CLINIC | Age: 70
End: 2020-10-05
Payer: MEDICARE

## 2020-10-05 VITALS
WEIGHT: 147.06 LBS | SYSTOLIC BLOOD PRESSURE: 161 MMHG | DIASTOLIC BLOOD PRESSURE: 91 MMHG | HEART RATE: 73 BPM | BODY MASS INDEX: 26.05 KG/M2

## 2020-10-05 DIAGNOSIS — F03.90 DEMENTIA WITHOUT BEHAVIORAL DISTURBANCE, UNSPECIFIED DEMENTIA TYPE: ICD-10-CM

## 2020-10-05 PROCEDURE — 36415 COLL VENOUS BLD VENIPUNCTURE: CPT

## 2020-10-05 PROCEDURE — 99999 PR PBB SHADOW E&M-EST. PATIENT-LVL IV: ICD-10-PCS | Mod: PBBFAC,,, | Performed by: PSYCHIATRY & NEUROLOGY

## 2020-10-05 PROCEDURE — 86592 SYPHILIS TEST NON-TREP QUAL: CPT

## 2020-10-05 PROCEDURE — 99214 OFFICE O/P EST MOD 30 MIN: CPT | Mod: PBBFAC,PO | Performed by: PSYCHIATRY & NEUROLOGY

## 2020-10-05 PROCEDURE — 99999 PR PBB SHADOW E&M-EST. PATIENT-LVL IV: CPT | Mod: PBBFAC,,, | Performed by: PSYCHIATRY & NEUROLOGY

## 2020-10-05 PROCEDURE — 84425 ASSAY OF VITAMIN B-1: CPT

## 2020-10-05 PROCEDURE — 99204 OFFICE O/P NEW MOD 45 MIN: CPT | Mod: S$PBB,,, | Performed by: PSYCHIATRY & NEUROLOGY

## 2020-10-05 PROCEDURE — 99204 PR OFFICE/OUTPT VISIT, NEW, LEVL IV, 45-59 MIN: ICD-10-PCS | Mod: S$PBB,,, | Performed by: PSYCHIATRY & NEUROLOGY

## 2020-10-05 RX ORDER — DONEPEZIL HYDROCHLORIDE 5 MG/1
5 TABLET, FILM COATED ORAL NIGHTLY
Qty: 90 TABLET | Refills: 3 | Status: SHIPPED | OUTPATIENT
Start: 2020-10-05 | End: 2021-05-31 | Stop reason: SDUPTHER

## 2020-10-05 NOTE — PROGRESS NOTES
[facility] NEUROLOGY  [facility], [place]    Date: 10/5/20  Patient Name: Flor Erwin   MRN: [id number]   PCP: Shruthi St  Referring Provider: Shruthi St MD    Assessment:   Flor Erwin is a 70 y.o. female  Presenting for evaluation of memory change.  Patient scored 17/30 have [facility] today with features concerning for Alzheimer's dementia.  Patient gave me permission to discuss with her daughter Tatyana via phone.  Tender provided limited history but also states that she had been unaware that her mother was to see a neurologist today and states that her mother often does not share information about her health.  Cancer states she will help coordinate patient's visits going forward as patient has had a history of struggling to manage her own healthcare. Initiating workup with neuropsych assessment for inclusion in care ecosystem, OT driving evaluation, labs, and baseline MRI brain. Patient exhibits very poor insight today. Reviewed advanced care planning and safety with patient and daughter today. Both counseled I do not feel she is safe to drive unless cleared by driving eval.  Plan:     Problem List Items Addressed This Visit     None      Visit Diagnoses     Dementia without behavioral disturbance, unspecified dementia type        Relevant Orders    MRI Brain Without Contrast    Vitamin B1    RPR    Ambulatory referral/consult to Neuropsychology    OT driving evaluation          Ariel Barrow MD  [facility]   Department of Neurology    Patient note was created using MModal Dictation.  Any errors in syntax or even information may not have been identified and edited on initial review prior to signing this note.  Subjective:   Patient seen in consultation at the request of Shruthi St MD for the evaluation of memory changes. A copy of this note will be sent to the referring physician.        HPI:   Ms. Flor Erwin is a 70 y.o. female with past medical  history of hypertension, presented to the COVID and AF with RVR resenting for evaluation of memory change.  The patient presents today with family contribute to the history.  Patient denies any awareness of any cognitive deficit. Her family physician has noted that she has had to repeat things over and over again for the patient to recall them and there is concern she is not managing her medications well. She lives with a friend but presents alone today. She denies any issues completing ADLs, mood issues, dangerous behaviors, hallucinations, sleep disturbances, falls. She continues to drive.  She is a retired .     The patient underwent CT head with questionable signal intensity in the brainstem/johnson.  She has not had MRI brain completed.  B12 is normal and TSH is below the limits of detection.    PAST MEDICAL HISTORY:  Past Medical History:   Diagnosis Date    Hypertension        PAST SURGICAL HISTORY:  Past Surgical History:   Procedure Laterality Date    CHOLECYSTECTOMY      HYSTERECTOMY      partial in her 40's    OOPHORECTOMY         CURRENT MEDS:  Current Outpatient Medications   Medication Sig Dispense Refill    amlodipine-benazepril 10-20mg (LOTREL) 10-20 mg per capsule Take 1 capsule by mouth once daily.      apixaban (ELIQUIS) 5 mg Tab Take 1 tablet (5 mg total) by mouth 2 (two) times daily. 180 tablet 3    aspirin 81 MG Chew Take 81 mg by mouth once daily.      betamethasone dipropionate (DIPROLENE) 0.05 % ointment Apply topically 2 (two) times daily as directed to affected area 45 g 1    diclofenac sodium (VOLTAREN) 1 % Gel Apply 2 g topically once daily. 1 Tube 2    EUTHYROX 100 mcg tablet TAKE 1 TABLET BY MOUTH ONCE DAILY ON EMPTY STOMACH WITH WATER. WAIT 30 MINUTES BEORE FOOD OR OTHER MEDICATIONS. STOP DOSE OF 50 MCG.      meloxicam (MOBIC) 7.5 MG tablet Take 1 tablet (7.5 mg total) by mouth once daily. 30 tablet 2    olopatadine (PATANOL) 0.1 % ophthalmic solution  olopatadine 0.1 % eye drops   INSTILL 1 DROP INTO AFFECTED EYE(S) TWICE DAILY AT INTERVAL OF 6 TO 8 HOURS      oxybutynin (DITROPAN) 5 MG Tab Take 5 mg by mouth 3 (three) times daily.      propranolol (INDERAL) 10 MG tablet Take 3 tablets (30 mg total) by mouth 3 (three) times daily. 270 tablet 3    traMADoL (ULTRAM) 50 mg tablet Take 50 mg by mouth nightly.      donepeziL (ARICEPT) 5 MG tablet Take 1 tablet (5 mg total) by mouth every evening. 90 tablet 3     No current facility-administered medications for this visit.        ALLERGIES:  Review of patient's allergies indicates:   Allergen Reactions    Penicillins Hives       FAMILY HISTORY:  History reviewed. No pertinent family history.    SOCIAL HISTORY:  Social History     Tobacco Use    Smoking status: Never Smoker   Substance Use Topics    Alcohol use: No    Drug use: No       Review of Systems:  12 system review of systems is negative except for the symptoms mentioned in HPI.      Objective:     Vitals:    10/05/20 1025   BP: (!) 161/91   Pulse: 73   Weight: 66.7 kg (147 lb 0.8 oz)     General: NAD, well nourished   Eyes: no tearing, discharge, no erythema   ENT: moist mucous membranes of the oral cavity, nares patent    Neck: Supple, full range of motion  Cardiovascular: Warm and well perfused, pulses equal and symmetrical  Lungs: Normal work of breathing, normal chest wall excursions  Skin: No rash, lesions, or breakdown on exposed skin  Psychiatry: Mood and affect are appropriate   Abdomen: soft, non tender, non distended  Extremeties: No cyanosis, clubbing or edema.    Neurological   MENTAL STATUS: Alert and oriented to person only. Attention and concentration within normal limits. Speech without dysarthria, able to name and repeat with moderate difficulty. Recent and remote memory fair   CRANIAL NERVES: Visual fields intact. PERRL. EOMI. Facial sensation intact. Face symmetrical. Hearing grossly intact. Full shoulder shrug bilaterally. Tongue  protrudes midline   SENSORY: Sensation is intact to vibration throughout.   MOTOR: Normal bulk and tone. Moves both extremities symmetrically.   REFLEXES: Symmetric and 2+ throughout.   CEREBELLAR/COORDINATION/GAIT: Gait steady with normal arm swing and stride length.  Finger to nose intact. Normal rapid alternating movements.     MOCA Results 10/05/2020 :   Visuospatial/Executive: 4/5  Namin/3  Attention: 3/6  Language: 2/3  Abstraction: 2/2  Delayed Recall: 0/5  Orientation: 4/6  Total:

## 2020-10-05 NOTE — LETTER
October 5, 2020      Shruthi St MD  [address] [facility]  [place]  [place] [address]           [facility]  [address]  [place] [address]  Phone: [phone]  Fax: [phone]          Patient: Flor Erwin   MR Number: [id number]   YOB: 1950   Date of Visit: 10/5/2020       Dear Dr. Shruthi St:    Thank you for referring Flor Erwin to me for evaluation. Attached you will find relevant portions of my assessment and plan of care.    If you have questions, please do not hesitate to call me. I look forward to following Flor Erwin along with you.    Sincerely,    Ariel Barrow MD    Enclosure  CC:  No Recipients    If you would like to receive this communication electronically, please contact [email or web address] or [phone] to request more information on [employer] Link access.    For providers and/or their staff who would like to refer a patient to [facility], please contact us through our one-stop-shop provider referral line, [facility], at [phone].    If you feel you have received this communication in error or would no longer like to receive these types of communications, please e-mail [email or web address]

## 2020-10-06 LAB — RPR SER QL: NORMAL

## 2020-10-09 LAB — VIT B1 BLD-MCNC: 42 UG/L (ref 38–122)

## 2020-10-12 ENCOUNTER — HOSPITAL ENCOUNTER (OUTPATIENT)
Dept: RADIOLOGY | Facility: HOSPITAL | Age: 70
Discharge: HOME | End: 2020-10-12
Attending: PSYCHIATRY & NEUROLOGY
Payer: MEDICARE

## 2020-10-12 DIAGNOSIS — F03.90 DEMENTIA WITHOUT BEHAVIORAL DISTURBANCE, UNSPECIFIED DEMENTIA TYPE: ICD-10-CM

## 2020-10-12 PROCEDURE — 70551 MRI BRAIN WITHOUT CONTRAST: ICD-10-PCS | Mod: 26,,, | Performed by: RADIOLOGY

## 2020-10-12 PROCEDURE — 70551 MRI BRAIN STEM W/O DYE: CPT | Mod: 26,,, | Performed by: RADIOLOGY

## 2020-10-12 PROCEDURE — 70551 MRI BRAIN STEM W/O DYE: CPT | Mod: TC

## 2020-10-22 ENCOUNTER — OFFICE VISIT (OUTPATIENT)
Dept: ORTHOPEDICS | Facility: CLINIC | Age: 70
End: 2020-10-22
Payer: MEDICARE

## 2020-10-22 VITALS — RESPIRATION RATE: 14 BRPM | HEIGHT: 63 IN | BODY MASS INDEX: 26.05 KG/M2

## 2020-10-22 DIAGNOSIS — M25.512 LEFT SHOULDER PAIN, UNSPECIFIED CHRONICITY: Primary | ICD-10-CM

## 2020-10-22 PROCEDURE — 99213 PR OFFICE/OUTPT VISIT, EST, LEVL III, 20-29 MIN: ICD-10-PCS | Mod: S$PBB,,, | Performed by: PHYSICIAN ASSISTANT

## 2020-10-22 PROCEDURE — 99213 OFFICE O/P EST LOW 20 MIN: CPT | Mod: PBBFAC,PN | Performed by: PHYSICIAN ASSISTANT

## 2020-10-22 PROCEDURE — 99999 PR PBB SHADOW E&M-EST. PATIENT-LVL III: ICD-10-PCS | Mod: PBBFAC,,, | Performed by: PHYSICIAN ASSISTANT

## 2020-10-22 PROCEDURE — 99213 OFFICE O/P EST LOW 20 MIN: CPT | Mod: S$PBB,,, | Performed by: PHYSICIAN ASSISTANT

## 2020-10-22 PROCEDURE — 99999 PR PBB SHADOW E&M-EST. PATIENT-LVL III: CPT | Mod: PBBFAC,,, | Performed by: PHYSICIAN ASSISTANT

## 2020-10-22 RX ORDER — MELOXICAM 7.5 MG/1
7.5 TABLET ORAL DAILY
Qty: 30 TABLET | Refills: 3 | Status: SHIPPED | OUTPATIENT
Start: 2020-10-22 | End: 2020-11-21

## 2020-10-23 NOTE — PROGRESS NOTES
Subjective:      Patient ID: Flor Erwin is a 70 y.o. female.    Chief Complaint: Pain of the Left Shoulder      HPI: Flor Erwin is a 70-year-old female in clinic today for follow-up of left shoulder pain.  Six weeks ago patient was seen received corticosteroid injection of the left shoulder.  Patient reports that her symptoms are much improved at this time.  She has no new complaints.  Patient would like refill of meloxicam    Past Medical History:   Diagnosis Date    Hypertension        Current Outpatient Medications:     amlodipine-benazepril 10-20mg (LOTREL) 10-20 mg per capsule, Take 1 capsule by mouth once daily., Disp: , Rfl:     apixaban (ELIQUIS) 5 mg Tab, Take 1 tablet (5 mg total) by mouth 2 (two) times daily., Disp: 180 tablet, Rfl: 3    aspirin 81 MG Chew, Take 81 mg by mouth once daily., Disp: , Rfl:     betamethasone dipropionate (DIPROLENE) 0.05 % ointment, Apply topically 2 (two) times daily as directed to affected area, Disp: 45 g, Rfl: 1    diclofenac sodium (VOLTAREN) 1 % Gel, Apply 2 g topically once daily., Disp: 1 Tube, Rfl: 2    donepeziL (ARICEPT) 5 MG tablet, Take 1 tablet (5 mg total) by mouth every evening., Disp: 90 tablet, Rfl: 3    EUTHYROX 100 mcg tablet, TAKE 1 TABLET BY MOUTH ONCE DAILY ON EMPTY STOMACH WITH WATER. WAIT 30 MINUTES BEORE FOOD OR OTHER MEDICATIONS. STOP DOSE OF 50 MCG., Disp: , Rfl:     olopatadine (PATANOL) 0.1 % ophthalmic solution, olopatadine 0.1 % eye drops  INSTILL 1 DROP INTO AFFECTED EYE(S) TWICE DAILY AT INTERVAL OF 6 TO 8 HOURS, Disp: , Rfl:     oxybutynin (DITROPAN) 5 MG Tab, Take 5 mg by mouth 3 (three) times daily., Disp: , Rfl:     propranolol (INDERAL) 10 MG tablet, Take 3 tablets (30 mg total) by mouth 3 (three) times daily., Disp: 270 tablet, Rfl: 3    traMADoL (ULTRAM) 50 mg tablet, Take 50 mg by mouth nightly., Disp: , Rfl:     meloxicam (MOBIC) 7.5 MG tablet, Take 1 tablet (7.5 mg total) by mouth once daily. (Patient  "not taking: Reported on 10/22/2020), Disp: 30 tablet, Rfl: 2    meloxicam (MOBIC) 7.5 MG tablet, Take 1 tablet (7.5 mg total) by mouth once daily., Disp: 30 tablet, Rfl: 3  Review of patient's allergies indicates:   Allergen Reactions    Penicillins Hives       Resp 14   Ht 5' 3" (1.6 m)   LMP  (LMP Unknown)   BMI 26.05 kg/m²     ROS      Objective:    Ortho Exam   Left shoulder:  No TTP  No pain with internal/external rotation  ROM full  Negative impingement test  Negative cross-body test    GEN: Well developed, well nourished female. AAOX3. No acute distress.   Normocephalic, atraumatic.   DEONTE  Breathing unlabored.  Mood and affect appropriate.        Assessment:     Imaging:  No new imaging ordered today        1. Left shoulder pain, unspecified chronicity          Plan:       Encouraged the patient on the improvement she has made.  I have refilled her meloxicam.  Patient will follow up in 6 weeks if pain has returned, otherwise I instructed her to follow up as needed    Orders Placed This Encounter    meloxicam (MOBIC) 7.5 MG tablet     Follow up in about 6 weeks (around 12/3/2020).          "

## 2020-11-27 ENCOUNTER — TELEPHONE (OUTPATIENT)
Dept: ORTHOPEDICS | Facility: CLINIC | Age: 70
End: 2020-11-27

## 2020-11-27 NOTE — TELEPHONE ENCOUNTER
----- Message from Elizabeth Meza sent at 11/27/2020  9:23 AM CST -----  Contact: [phone]  Type:  Needs Medical Advice    Who Called: PT  Reason for Call: pt would like to speak with the nurse regarding Imaging of the pt's arm and shoulder, pt states she is in severe pain and would like a call back ass soon as Possible  Would the patient rather a call back or a response via [email or web address]?  Call back  Best Call Back Number: [phone]  Additional Information:  please leave a Detailed Message

## 2020-11-27 NOTE — TELEPHONE ENCOUNTER
----- Message from Gertrude Cline sent at 11/27/2020  4:11 PM CST -----  Type:  Patient Returning Call    Who Called:Patient  Who Left Message for Patient:office  Does the patient know what this is regarding?:   Would the patient rather a call back or a response via [facility]? call  Best Call Back Number: [phone]  Additional Information:

## 2020-12-03 ENCOUNTER — OFFICE VISIT (OUTPATIENT)
Dept: ORTHOPEDICS | Facility: CLINIC | Age: 70
End: 2020-12-03
Payer: MEDICARE

## 2020-12-03 VITALS
HEART RATE: 82 BPM | BODY MASS INDEX: 26.06 KG/M2 | SYSTOLIC BLOOD PRESSURE: 164 MMHG | WEIGHT: 147.06 LBS | DIASTOLIC BLOOD PRESSURE: 72 MMHG | HEIGHT: 63 IN

## 2020-12-03 DIAGNOSIS — M25.512 CHRONIC LEFT SHOULDER PAIN: ICD-10-CM

## 2020-12-03 DIAGNOSIS — G89.29 CHRONIC LEFT SHOULDER PAIN: ICD-10-CM

## 2020-12-03 PROCEDURE — 99999 PR PBB SHADOW E&M-EST. PATIENT-LVL III: CPT | Mod: PBBFAC,,, | Performed by: PHYSICIAN ASSISTANT

## 2020-12-03 PROCEDURE — 99213 OFFICE O/P EST LOW 20 MIN: CPT | Mod: S$PBB,,, | Performed by: PHYSICIAN ASSISTANT

## 2020-12-03 PROCEDURE — 99213 OFFICE O/P EST LOW 20 MIN: CPT | Mod: PBBFAC,PN | Performed by: PHYSICIAN ASSISTANT

## 2020-12-03 PROCEDURE — 99213 PR OFFICE/OUTPT VISIT, EST, LEVL III, 20-29 MIN: ICD-10-PCS | Mod: S$PBB,,, | Performed by: PHYSICIAN ASSISTANT

## 2020-12-03 PROCEDURE — 99999 PR PBB SHADOW E&M-EST. PATIENT-LVL III: ICD-10-PCS | Mod: PBBFAC,,, | Performed by: PHYSICIAN ASSISTANT

## 2020-12-03 RX ORDER — LEVOTHYROXINE SODIUM 112 UG/1
112 TABLET ORAL DAILY
COMMUNITY
Start: 2020-11-28 | End: 2023-07-06

## 2020-12-03 NOTE — PROGRESS NOTES
Subjective:      Patient ID: Flor Erwin is a 70 y.o. female.    Chief Complaint: Arm Pain (left) and Follow-up (6 week )      HPI: Flor Erwin is a 70-year-old female in clinic today for follow-up of left shoulder pain.  Patient has been treated for this for the past several months.  She has received corticosteroid injection, has under gone home exercise program and activity modification.  Patient reports that symptoms are resolved.  Patient states pain is worse at night especially when sleeping on her left side.    Past Medical History:   Diagnosis Date    Hypertension        Current Outpatient Medications:     amlodipine-benazepril 10-20mg (LOTREL) 10-20 mg per capsule, Take 1 capsule by mouth once daily., Disp: , Rfl:     apixaban (ELIQUIS) 5 mg Tab, Take 1 tablet (5 mg total) by mouth 2 (two) times daily., Disp: 180 tablet, Rfl: 3    donepeziL (ARICEPT) 5 MG tablet, Take 1 tablet (5 mg total) by mouth every evening., Disp: 90 tablet, Rfl: 3    EUTHYROX 112 mcg tablet, Take 112 mcg by mouth once daily., Disp: , Rfl:     meloxicam (MOBIC) 7.5 MG tablet, Take 1 tablet (7.5 mg total) by mouth once daily., Disp: 30 tablet, Rfl: 2    aspirin 81 MG Chew, Take 81 mg by mouth once daily., Disp: , Rfl:     betamethasone dipropionate (DIPROLENE) 0.05 % ointment, Apply topically 2 (two) times daily as directed to affected area (Patient not taking: Reported on 12/3/2020), Disp: 45 g, Rfl: 1    diclofenac sodium (VOLTAREN) 1 % Gel, Apply 2 g topically once daily. (Patient not taking: Reported on 12/3/2020), Disp: 1 Tube, Rfl: 2    olopatadine (PATANOL) 0.1 % ophthalmic solution, olopatadine 0.1 % eye drops  INSTILL 1 DROP INTO AFFECTED EYE(S) TWICE DAILY AT INTERVAL OF 6 TO 8 HOURS, Disp: , Rfl:     oxybutynin (DITROPAN) 5 MG Tab, Take 5 mg by mouth 3 (three) times daily., Disp: , Rfl:     propranolol (INDERAL) 10 MG tablet, Take 3 tablets (30 mg total) by mouth 3 (three) times daily. (Patient not taking:  "Reported on 12/3/2020), Disp: 270 tablet, Rfl: 3    traMADoL (ULTRAM) 50 mg tablet, Take 50 mg by mouth nightly., Disp: , Rfl:   Review of patient's allergies indicates:   Allergen Reactions    Penicillins Hives       BP (!) 164/72   Pulse 82   Ht 5' 3" (1.6 m)   Wt 66.7 kg (147 lb 0.8 oz)   LMP  (LMP Unknown)   BMI 26.05 kg/m²     ROS      Objective:    Ortho Exam   Left shoulder:  TTP anteriorly over the AC joint  No pain with internal/external rotation  ROM decreased secondary to pain  Positive impingement sign  Sensation and pulses intact    GEN: Well developed, well nourished female. AAOX3. No acute distress.   Normocephalic, atraumatic.   DEONTE  Breathing unlabored.  Mood and affect appropriate.        Assessment:     Imaging:  No new imaging ordered today        1. Chronic left shoulder pain          Plan:       I explained to the patient that I am concerned there may be a problem with her rotator cuff due to the fact her symptoms are not improving with conservative treatment.  I have suggested that we order an MRI for further evaluation.  Patient will follow up with the results of the MRI to discuss the next steps    Orders Placed This Encounter    MRI Shoulder Without Contrast Left     Follow up for MRI results.          "

## 2020-12-09 ENCOUNTER — CLINICAL SUPPORT (OUTPATIENT)
Dept: OPHTHALMOLOGY | Facility: CLINIC | Age: 70
End: 2020-12-09
Payer: MEDICARE

## 2020-12-09 ENCOUNTER — OFFICE VISIT (OUTPATIENT)
Dept: OPHTHALMOLOGY | Facility: CLINIC | Age: 70
End: 2020-12-09
Payer: MEDICARE

## 2020-12-09 DIAGNOSIS — E07.9 THYROID EYE DISEASE: Primary | ICD-10-CM

## 2020-12-09 DIAGNOSIS — H02.112 CICATRICIAL ECTROPION OF LOWER EYELIDS OF BOTH EYES: ICD-10-CM

## 2020-12-09 DIAGNOSIS — H25.813 COMBINED FORM OF AGE-RELATED CATARACT, BOTH EYES: ICD-10-CM

## 2020-12-09 DIAGNOSIS — H02.115 CICATRICIAL ECTROPION OF LOWER EYELIDS OF BOTH EYES: ICD-10-CM

## 2020-12-09 DIAGNOSIS — H57.89 THYROID EYE DISEASE: Primary | ICD-10-CM

## 2020-12-09 PROCEDURE — 92014 PR EYE EXAM, EST PATIENT,COMPREHESV: ICD-10-PCS | Mod: S$PBB,,, | Performed by: OPHTHALMOLOGY

## 2020-12-09 PROCEDURE — 99999 PR PBB SHADOW E&M-EST. PATIENT-LVL III: ICD-10-PCS | Mod: PBBFAC,,, | Performed by: OPHTHALMOLOGY

## 2020-12-09 PROCEDURE — 92014 COMPRE OPH EXAM EST PT 1/>: CPT | Mod: S$PBB,,, | Performed by: OPHTHALMOLOGY

## 2020-12-09 PROCEDURE — 92083 HUMPHREY VISUAL FIELD - OU - BOTH EYES: ICD-10-PCS | Mod: 26,S$PBB,, | Performed by: OPHTHALMOLOGY

## 2020-12-09 PROCEDURE — 92083 EXTENDED VISUAL FIELD XM: CPT | Mod: PBBFAC | Performed by: OPHTHALMOLOGY

## 2020-12-09 PROCEDURE — 99999 PR PBB SHADOW E&M-EST. PATIENT-LVL III: CPT | Mod: PBBFAC,,, | Performed by: OPHTHALMOLOGY

## 2020-12-09 PROCEDURE — 99213 OFFICE O/P EST LOW 20 MIN: CPT | Mod: PBBFAC | Performed by: OPHTHALMOLOGY

## 2020-12-09 NOTE — LETTER
[facility] - [facility] [address]st [address]  [address]  [place] [address]  Phone: [phone]  Fax: [phone]   December 9, 2020    Marko Forde MD  [address] [facility]  [place] [address]    Patient: Flor Erwin   MR Number: [id number]   YOB: 1950   Date of Visit: 12/9/2020       Dear Dr. Forde:    Thank you for referring Flor Erwin to me for evaluation. Here is my assessment and plan of care:    Assessment/Plan     For exam results, see Encounter Report.    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 Erwin is developing ectropion of her lower eyelids due to the DAMI. This is resulting in a tear film insufficiency and epiphora of both eyes.  I recommended artificial tears four times daily, a lubricating ointment at bedtime, and selenium 200 microgram daily. I will notify Dr. Mathews that she may be a candidate for cataract surgery. Repeat exam in three months.          Below you will find my full exam findings. If you have questions, please do not hesitate to call me. I look forward to following Ms. Flor Erwin along with you.    Sincerely,          Aroldo Ghosh MD       CC  No Recipients             Base Eye Exam     Visual Acuity (Snellen - Linear)       Right Left    Dist cc 20/100 -1 20/50    Dist ph cc 20/40 -2 20/40 -1    Correction: Glasses          Tonometry (Applanation, 2:45 PM)       Right Left    Pressure 22 22          Pupils       Dark Light Shape React APD    Right 5 3 Round Brisk None    Left 5 3 Round Brisk None          Visual Fields    See HVF report           Extraocular Movement       Right Left     Full, Ortho Full, Ortho          Neuro/Psych     Oriented x3: Yes    Mood/Affect: Normal          Dilation     Both eyes: 1% Mydriacyl, 2.5% Phenylephrine @ 2:47 PM            Additional  Tests     Color       Right Left    Ishihara 12/12 12/12            Slit Lamp and Fundus Exam     External Exam       Right Left    External Prolapsed fat pads: Upper, Lower     Palpebral fissure 13 mm 13 mm    MRD1 4.5 mm 4.0 mm    Superior scleral show 0.0 mm 0.0 mm    Inferior scleral show 2.0 mm 2.0 mm    Levator 15 mm 15 mm    Lagophthalmos 0 mm 0 mm    Lid crease 5 mm 5 mm          Exophthalmometry (Base: 106 mm)       Right Left     21 mm 22 mm          Slit Lamp Exam       Right Left    Lids/Lashes Lid retraction: lower lid Lid retraction: lower lid    Conjunctiva/Sclera 2+ Injection 2+ Injection    Cornea Arcus Arcus    Anterior Chamber Deep and quiet Deep and quiet    Iris Round and reactive Round and reactive    Lens 2+ Nuclear sclerosis, 2+ Cortical cataract 2+ Nuclear sclerosis, 1+ Cortical cataract    Vitreous Normal Normal          Fundus Exam       Right Left    Disc Normal Normal    C/D Ratio 0.3 0.3    Macula Normal Normal    Vessels Normal Normal    Periphery Normal Normal

## 2020-12-09 NOTE — LETTER
December 9, 2020      Marko Forde MD  [address]  [place] [address]           [facility] - [facility] 1st Fl  [address]  [facility] [address]  Phone: [phone]  Fax: [phone]          Patient: Flor Erwin   MR Number: [id number]   YOB: 1950   Date of Visit: 12/9/2020       Dear Dr. Marko Forde:    Thank you for referring Flor Erwin to me for evaluation. Attached you will find relevant portions of my assessment and plan of care.    If you have questions, please do not hesitate to call me. I look forward to following Flor Erwin along with you.    Sincerely,    Aroldo Ghosh MD    Enclosure  CC:  No Recipients    If you would like to receive this communication electronically, please contact [email or web address] or [phone] to request more information on ED01 Link access.    For providers and/or their staff who would like to refer a patient to [facility], please contact us through our one-stop-shop provider referral line, [facility], at [phone].    If you feel you have received this communication in error or would no longer like to receive these types of communications, please e-mail [email or web address]

## 2020-12-09 NOTE — PATIENT INSTRUCTIONS
Artificial tears four times daily.  Lubricating eye ointment at bedtime.  Selenium 200 micrograms daily.  Follow up with Dr. Forde regarding possible need for cataract surgery.  Return to me in three months.

## 2020-12-10 ENCOUNTER — HOSPITAL ENCOUNTER (OUTPATIENT)
Dept: RADIOLOGY | Facility: HOSPITAL | Age: 70
Discharge: HOME | End: 2020-12-10
Attending: PHYSICIAN ASSISTANT
Payer: MEDICARE

## 2020-12-10 DIAGNOSIS — G89.29 CHRONIC LEFT SHOULDER PAIN: ICD-10-CM

## 2020-12-10 DIAGNOSIS — M25.512 CHRONIC LEFT SHOULDER PAIN: ICD-10-CM

## 2020-12-10 PROCEDURE — 73221 MRI JOINT UPR EXTREM W/O DYE: CPT | Mod: 26,LT,, | Performed by: RADIOLOGY

## 2020-12-10 PROCEDURE — 73221 MRI SHOULDER WITHOUT CONTRAST LEFT: ICD-10-PCS | Mod: 26,LT,, | Performed by: RADIOLOGY

## 2020-12-10 PROCEDURE — 73221 MRI JOINT UPR EXTREM W/O DYE: CPT | Mod: TC,LT

## 2020-12-22 ENCOUNTER — OFFICE VISIT (OUTPATIENT)
Dept: ORTHOPEDICS | Facility: CLINIC | Age: 70
End: 2020-12-22
Payer: MEDICARE

## 2020-12-22 ENCOUNTER — TELEPHONE (OUTPATIENT)
Dept: ORTHOPEDICS | Facility: CLINIC | Age: 70
End: 2020-12-22

## 2020-12-22 VITALS — BODY MASS INDEX: 26.05 KG/M2 | HEIGHT: 63 IN | WEIGHT: 147 LBS

## 2020-12-22 DIAGNOSIS — M75.112 NONTRAUMATIC INCOMPLETE TEAR OF LEFT ROTATOR CUFF: ICD-10-CM

## 2020-12-22 DIAGNOSIS — Z41.9 ELECTIVE SURGERY: Primary | ICD-10-CM

## 2020-12-22 PROCEDURE — 99999 PR PBB SHADOW E&M-EST. PATIENT-LVL III: CPT | Mod: PBBFAC,,, | Performed by: ORTHOPAEDIC SURGERY

## 2020-12-22 PROCEDURE — 99214 OFFICE O/P EST MOD 30 MIN: CPT | Mod: S$PBB,,, | Performed by: ORTHOPAEDIC SURGERY

## 2020-12-22 PROCEDURE — 99213 OFFICE O/P EST LOW 20 MIN: CPT | Mod: PBBFAC,PN | Performed by: ORTHOPAEDIC SURGERY

## 2020-12-22 PROCEDURE — 99999 PR PBB SHADOW E&M-EST. PATIENT-LVL III: ICD-10-PCS | Mod: PBBFAC,,, | Performed by: ORTHOPAEDIC SURGERY

## 2020-12-22 PROCEDURE — 99214 PR OFFICE/OUTPT VISIT, EST, LEVL IV, 30-39 MIN: ICD-10-PCS | Mod: S$PBB,,, | Performed by: ORTHOPAEDIC SURGERY

## 2020-12-22 NOTE — PROGRESS NOTES
CC:  Rotator cuff tear left shoulder        HPI:  Flor Erwin is a very pleasant 70 y.o. female with ongoing symptoms left shoulder related to partial tear of the rotator cuff  Recent MRI confirms a diagnosis of partial tear  She has been through physical therapy and had injections in the past without significant relief  Having increasing pain now would like to consider surgery for the left shoulder         PAST MEDICAL HISTORY:   Past Medical History:   Diagnosis Date    Hypertension      PAST SURGICAL HISTORY:   Past Surgical History:   Procedure Laterality Date    CHOLECYSTECTOMY      HYSTERECTOMY      partial in her 40's    OOPHORECTOMY       FAMILY HISTORY: No family history on file.  SOCIAL HISTORY:   Social History     Socioeconomic History    Marital status:      Spouse name: Not on file    Number of children: Not on file    Years of education: Not on file    Highest education level: Not on file   Occupational History    Not on file   Social Needs    Financial resource strain: Not on file    Food insecurity     Worry: Not on file     Inability: Not on file    Transportation needs     Medical: Not on file     Non-medical: Not on file   Tobacco Use    Smoking status: Never Smoker   Substance and Sexual Activity    Alcohol use: No    Drug use: No    Sexual activity: Not Currently   Lifestyle    Physical activity     Days per week: Not on file     Minutes per session: Not on file    Stress: Not on file   Relationships    Social connections     Talks on phone: Not on file     Gets together: Not on file     Attends Protestant service: Not on file     Active member of club or organization: Not on file     Attends meetings of clubs or organizations: Not on file     Relationship status: Not on file   Other Topics Concern    Not on file   Social History Narrative    Not on file       MEDICATIONS:   Current Outpatient Medications:     amlodipine-benazepril 10-20mg (LOTREL) 10-20 mg  per capsule, Take 1 capsule by mouth once daily., Disp: , Rfl:     apixaban (ELIQUIS) 5 mg Tab, Take 1 tablet (5 mg total) by mouth 2 (two) times daily., Disp: 180 tablet, Rfl: 3    aspirin 81 MG Chew, Take 81 mg by mouth once daily., Disp: , Rfl:     betamethasone dipropionate (DIPROLENE) 0.05 % ointment, Apply topically 2 (two) times daily as directed to affected area, Disp: 45 g, Rfl: 1    diclofenac sodium (VOLTAREN) 1 % Gel, Apply 2 g topically once daily., Disp: 1 Tube, Rfl: 2    donepeziL (ARICEPT) 5 MG tablet, Take 1 tablet (5 mg total) by mouth every evening., Disp: 90 tablet, Rfl: 3    EUTHYROX 112 mcg tablet, Take 112 mcg by mouth once daily., Disp: , Rfl:     meloxicam (MOBIC) 7.5 MG tablet, Take 1 tablet (7.5 mg total) by mouth once daily., Disp: 30 tablet, Rfl: 2    olopatadine (PATANOL) 0.1 % ophthalmic solution, olopatadine 0.1 % eye drops  INSTILL 1 DROP INTO AFFECTED EYE(S) TWICE DAILY AT INTERVAL OF 6 TO 8 HOURS, Disp: , Rfl:     oxybutynin (DITROPAN) 5 MG Tab, Take 5 mg by mouth 3 (three) times daily., Disp: , Rfl:     propranolol (INDERAL) 10 MG tablet, Take 3 tablets (30 mg total) by mouth 3 (three) times daily., Disp: 270 tablet, Rfl: 3    traMADoL (ULTRAM) 50 mg tablet, Take 50 mg by mouth nightly., Disp: , Rfl:   ALLERGIES:   Review of patient's allergies indicates:   Allergen Reactions    Penicillins Hives       Review of Systems:  Constitutional: no fever or chills  ENT: no nasal congestion or sore throat  Respiratory: no cough or shortness of breath  Cardiovascular: no chest pain or palpitations  Gastrointestinal: no nausea or vomiting, PUD, GERD, NSAID intolerance  Genitourinary: no hematuria or dysuria  Integument/Breast: no rash or pruritis  Hematologic/Lymphatic: no easy bruising or lymphadenopathy  Musculoskeletal: see HPI  Neurological: no seizures or tremors  Behavioral/Psych: no auditory or visual hallucinations      Physical Exam   Vitals:    12/22/20 0918   Weight:  "66.7 kg (147 lb)   Height: 5' 3" (1.6 m)   PainSc: 10-Worst pain ever       Constitutional: Oriented to person, place, and time. Appears well-developed and well-nourished.   HENT:   Head: Normocephalic and atraumatic.   Nose: Nose normal.   Eyes: No scleral icterus.   Neck: Normal range of motion.   Cardiovascular: Normal rate and regular rhythm.    Pulses:       Radial pulses are 2+ on the right side, and 2+ on the left side.   Pulmonary/Chest: Effort normal and breath sounds normal.   Abdominal: Soft.   Neurological: Alert and oriented to person, place, and time.   Skin: Skin is warm.   Psychiatric: Normal mood and affect.     MUSCULOSKELETAL UPPER EXTREMITY:  Examination left shoulder no tenderness no swelling no bruising  Range of motion limited secondary to pain  Positive impingement sign  Positive supraspinatus stress test  Slight weakness rotator cuff  No instability  Neurologic exam intact            Diagnostic Studies:  MRI reviewed with patient demonstrates partial tear rotator cuff left shoulder        Assessment:  Partial tear rotator cuff left shoulder    Plan:  I recommended surgical treatment to include left shoulder arthroscopy and rotator cuff repair  The risks and benefits of surgery explained to the patient she understands      The risks and benefits of surgery were discussed with the patient today and they understand.  The consent was signed in the office for surgery.      Michael Shotr MD (Jay)  [facility]  Orthopedic Upper Extremity Surgery      "

## 2021-01-14 ENCOUNTER — TELEPHONE (OUTPATIENT)
Dept: ORTHOPEDICS | Facility: CLINIC | Age: 71
End: 2021-01-14

## 2021-01-21 ENCOUNTER — TELEPHONE (OUTPATIENT)
Dept: ORTHOPEDICS | Facility: CLINIC | Age: 71
End: 2021-01-21

## 2021-02-17 ENCOUNTER — OFFICE VISIT (OUTPATIENT)
Dept: CARDIOLOGY | Facility: CLINIC | Age: 71
End: 2021-02-17
Payer: MEDICARE

## 2021-02-17 VITALS
BODY MASS INDEX: 28.74 KG/M2 | SYSTOLIC BLOOD PRESSURE: 111 MMHG | WEIGHT: 162.25 LBS | HEART RATE: 77 BPM | DIASTOLIC BLOOD PRESSURE: 70 MMHG | OXYGEN SATURATION: 98 %

## 2021-02-17 DIAGNOSIS — E05.90 HYPERTHYROIDISM: ICD-10-CM

## 2021-02-17 DIAGNOSIS — I48.0 PAROXYSMAL A-FIB: Primary | ICD-10-CM

## 2021-02-17 DIAGNOSIS — I35.1 NONRHEUMATIC AORTIC VALVE INSUFFICIENCY: ICD-10-CM

## 2021-02-17 DIAGNOSIS — I10 ESSENTIAL HYPERTENSION: ICD-10-CM

## 2021-02-17 PROCEDURE — 99213 OFFICE O/P EST LOW 20 MIN: CPT | Mod: PBBFAC,PO | Performed by: INTERNAL MEDICINE

## 2021-02-17 PROCEDURE — 99999 PR PBB SHADOW E&M-EST. PATIENT-LVL III: CPT | Mod: PBBFAC,,, | Performed by: INTERNAL MEDICINE

## 2021-02-17 PROCEDURE — 99999 PR PBB SHADOW E&M-EST. PATIENT-LVL III: ICD-10-PCS | Mod: PBBFAC,,, | Performed by: INTERNAL MEDICINE

## 2021-02-17 PROCEDURE — 93010 EKG 12-LEAD: ICD-10-PCS | Mod: S$PBB,,, | Performed by: INTERNAL MEDICINE

## 2021-02-17 PROCEDURE — 93010 ELECTROCARDIOGRAM REPORT: CPT | Mod: S$PBB,,, | Performed by: INTERNAL MEDICINE

## 2021-02-17 PROCEDURE — 99214 OFFICE O/P EST MOD 30 MIN: CPT | Mod: S$PBB,,, | Performed by: INTERNAL MEDICINE

## 2021-02-17 PROCEDURE — 99214 PR OFFICE/OUTPT VISIT, EST, LEVL IV, 30-39 MIN: ICD-10-PCS | Mod: S$PBB,,, | Performed by: INTERNAL MEDICINE

## 2021-02-17 PROCEDURE — 93005 ELECTROCARDIOGRAM TRACING: CPT | Mod: PBBFAC,PO | Performed by: INTERNAL MEDICINE

## 2021-03-07 ENCOUNTER — HOSPITAL ENCOUNTER (EMERGENCY)
Facility: HOSPITAL | Age: 71
Discharge: HOME | End: 2021-03-07
Attending: EMERGENCY MEDICINE
Payer: MEDICARE

## 2021-03-07 VITALS
WEIGHT: 150 LBS | SYSTOLIC BLOOD PRESSURE: 122 MMHG | TEMPERATURE: 100 F | BODY MASS INDEX: 26.57 KG/M2 | DIASTOLIC BLOOD PRESSURE: 58 MMHG | RESPIRATION RATE: 17 BRPM | HEART RATE: 80 BPM | OXYGEN SATURATION: 98 %

## 2021-03-07 DIAGNOSIS — M25.559 HIP PAIN: Primary | ICD-10-CM

## 2021-03-07 PROCEDURE — 25000003 PHARM REV CODE 250: Performed by: PHYSICIAN ASSISTANT

## 2021-03-07 PROCEDURE — 99283 EMERGENCY DEPT VISIT LOW MDM: CPT | Mod: 25

## 2021-03-07 RX ORDER — ACETAMINOPHEN 325 MG/1
650 TABLET ORAL
Status: DISCONTINUED | OUTPATIENT
Start: 2021-03-07 | End: 2021-03-07

## 2021-03-07 RX ORDER — ACETAMINOPHEN 325 MG/1
650 TABLET ORAL
Status: COMPLETED | OUTPATIENT
Start: 2021-03-07 | End: 2021-03-07

## 2021-03-07 RX ORDER — MELOXICAM 7.5 MG/1
7.5 TABLET ORAL DAILY
Qty: 30 TABLET | Refills: 0 | Status: SHIPPED | OUTPATIENT
Start: 2021-03-07 | End: 2021-04-14

## 2021-03-07 RX ADMIN — ACETAMINOPHEN 650 MG: 325 TABLET ORAL at 11:03

## 2021-03-09 ENCOUNTER — OFFICE VISIT (OUTPATIENT)
Dept: OPHTHALMOLOGY | Facility: CLINIC | Age: 71
End: 2021-03-09
Payer: MEDICARE

## 2021-03-09 DIAGNOSIS — E07.9 THYROID EYE DISEASE: Primary | ICD-10-CM

## 2021-03-09 DIAGNOSIS — H16.8 EXPOSURE KERATITIS: ICD-10-CM

## 2021-03-09 DIAGNOSIS — H57.89 THYROID EYE DISEASE: Primary | ICD-10-CM

## 2021-03-09 PROCEDURE — 99999 PR PBB SHADOW E&M-EST. PATIENT-LVL III: CPT | Mod: PBBFAC,,, | Performed by: OPHTHALMOLOGY

## 2021-03-09 PROCEDURE — 99999 PR PBB SHADOW E&M-EST. PATIENT-LVL III: ICD-10-PCS | Mod: PBBFAC,,, | Performed by: OPHTHALMOLOGY

## 2021-03-09 PROCEDURE — 99213 OFFICE O/P EST LOW 20 MIN: CPT | Mod: PBBFAC | Performed by: OPHTHALMOLOGY

## 2021-03-09 PROCEDURE — 92012 PR EYE EXAM, EST PATIENT,INTERMED: ICD-10-PCS | Mod: S$PBB,,, | Performed by: OPHTHALMOLOGY

## 2021-03-09 PROCEDURE — 92012 INTRM OPH EXAM EST PATIENT: CPT | Mod: S$PBB,,, | Performed by: OPHTHALMOLOGY

## 2021-03-09 RX ORDER — AMLODIPINE BESYLATE 10 MG/1
TABLET ORAL
COMMUNITY
Start: 2021-02-09 | End: 2021-04-14

## 2021-04-11 ENCOUNTER — NURSE TRIAGE (OUTPATIENT)
Dept: ADMINISTRATIVE | Facility: CLINIC | Age: 71
End: 2021-04-11

## 2021-04-11 ENCOUNTER — HOSPITAL ENCOUNTER (EMERGENCY)
Facility: HOSPITAL | Age: 71
Discharge: HOME | End: 2021-04-12
Attending: EMERGENCY MEDICINE
Payer: MEDICARE

## 2021-04-11 ENCOUNTER — HOSPITAL ENCOUNTER (EMERGENCY)
Facility: HOSPITAL | Age: 71
Discharge: HOME | End: 2021-04-11
Attending: EMERGENCY MEDICINE
Payer: MEDICARE

## 2021-04-11 VITALS
DIASTOLIC BLOOD PRESSURE: 64 MMHG | SYSTOLIC BLOOD PRESSURE: 131 MMHG | WEIGHT: 150 LBS | TEMPERATURE: 99 F | BODY MASS INDEX: 27.6 KG/M2 | OXYGEN SATURATION: 97 % | HEIGHT: 62 IN | RESPIRATION RATE: 18 BRPM | HEART RATE: 65 BPM

## 2021-04-11 DIAGNOSIS — K59.00 CONSTIPATION, UNSPECIFIED CONSTIPATION TYPE: ICD-10-CM

## 2021-04-11 DIAGNOSIS — R10.13 EPIGASTRIC ABDOMINAL PAIN: Primary | ICD-10-CM

## 2021-04-11 DIAGNOSIS — R11.2 NON-INTRACTABLE VOMITING WITH NAUSEA, UNSPECIFIED VOMITING TYPE: ICD-10-CM

## 2021-04-11 DIAGNOSIS — R10.84 GENERALIZED ABDOMINAL PAIN: Primary | ICD-10-CM

## 2021-04-11 LAB
ALBUMIN SERPL BCP-MCNC: 4.4 G/DL (ref 3.5–5.2)
ALP SERPL-CCNC: 116 U/L (ref 55–135)
ALT SERPL W/O P-5'-P-CCNC: 13 U/L (ref 10–44)
ANION GAP SERPL CALC-SCNC: 10 MMOL/L (ref 8–16)
AST SERPL-CCNC: 14 U/L (ref 10–40)
BASOPHILS # BLD AUTO: 0.02 K/UL (ref 0–0.2)
BASOPHILS NFR BLD: 0.3 % (ref 0–1.9)
BILIRUB SERPL-MCNC: 0.5 MG/DL (ref 0.1–1)
BILIRUB UR QL STRIP: NEGATIVE
BUN SERPL-MCNC: 8 MG/DL (ref 8–23)
CALCIUM SERPL-MCNC: 9.4 MG/DL (ref 8.7–10.5)
CHLORIDE SERPL-SCNC: 104 MMOL/L (ref 95–110)
CLARITY UR: CLEAR
CO2 SERPL-SCNC: 28 MMOL/L (ref 23–29)
COLOR UR: NORMAL
CREAT SERPL-MCNC: 0.9 MG/DL (ref 0.5–1.4)
DIFFERENTIAL METHOD: ABNORMAL
EOSINOPHIL # BLD AUTO: 0.1 K/UL (ref 0–0.5)
EOSINOPHIL NFR BLD: 1.4 % (ref 0–8)
ERYTHROCYTE [DISTWIDTH] IN BLOOD BY AUTOMATED COUNT: 14.6 % (ref 11.5–14.5)
EST. GFR  (AFRICAN AMERICAN): >60 ML/MIN/1.73 M^2
EST. GFR  (NON AFRICAN AMERICAN): >60 ML/MIN/1.73 M^2
GLUCOSE SERPL-MCNC: 130 MG/DL (ref 70–110)
GLUCOSE UR QL STRIP: NEGATIVE
HCT VFR BLD AUTO: 44.4 % (ref 37–48.5)
HGB BLD-MCNC: 14.7 G/DL (ref 12–16)
HGB UR QL STRIP: NEGATIVE
IMM GRANULOCYTES # BLD AUTO: 0.02 K/UL (ref 0–0.04)
IMM GRANULOCYTES NFR BLD AUTO: 0.3 % (ref 0–0.5)
KETONES UR QL STRIP: NEGATIVE
LEUKOCYTE ESTERASE UR QL STRIP: NEGATIVE
LIPASE SERPL-CCNC: 8 U/L (ref 4–60)
LYMPHOCYTES # BLD AUTO: 2.1 K/UL (ref 1–4.8)
LYMPHOCYTES NFR BLD: 27 % (ref 18–48)
MCH RBC QN AUTO: 29.8 PG (ref 27–31)
MCHC RBC AUTO-ENTMCNC: 33.1 G/DL (ref 32–36)
MCV RBC AUTO: 90 FL (ref 82–98)
MONOCYTES # BLD AUTO: 0.7 K/UL (ref 0.3–1)
MONOCYTES NFR BLD: 8.9 % (ref 4–15)
NEUTROPHILS # BLD AUTO: 4.7 K/UL (ref 1.8–7.7)
NEUTROPHILS NFR BLD: 62.1 % (ref 38–73)
NITRITE UR QL STRIP: NEGATIVE
NRBC BLD-RTO: 0 /100 WBC
PH UR STRIP: 8 [PH] (ref 5–8)
PLATELET # BLD AUTO: 288 K/UL (ref 150–450)
PMV BLD AUTO: 10.3 FL (ref 9.2–12.9)
POTASSIUM SERPL-SCNC: 3.9 MMOL/L (ref 3.5–5.1)
PROT SERPL-MCNC: 7.9 G/DL (ref 6–8.4)
PROT UR QL STRIP: NEGATIVE
RBC # BLD AUTO: 4.94 M/UL (ref 4–5.4)
SODIUM SERPL-SCNC: 142 MMOL/L (ref 136–145)
SP GR UR STRIP: 1.02 (ref 1–1.03)
URN SPEC COLLECT METH UR: NORMAL
UROBILINOGEN UR STRIP-ACNC: NEGATIVE EU/DL
WBC # BLD AUTO: 7.63 K/UL (ref 3.9–12.7)

## 2021-04-11 PROCEDURE — 85025 COMPLETE CBC W/AUTO DIFF WBC: CPT | Performed by: EMERGENCY MEDICINE

## 2021-04-11 PROCEDURE — 80053 COMPREHEN METABOLIC PANEL: CPT | Performed by: EMERGENCY MEDICINE

## 2021-04-11 PROCEDURE — 96374 THER/PROPH/DIAG INJ IV PUSH: CPT

## 2021-04-11 PROCEDURE — 83690 ASSAY OF LIPASE: CPT | Performed by: EMERGENCY MEDICINE

## 2021-04-11 PROCEDURE — 99284 EMERGENCY DEPT VISIT MOD MDM: CPT | Mod: 25,27

## 2021-04-11 PROCEDURE — 96375 TX/PRO/DX INJ NEW DRUG ADDON: CPT

## 2021-04-11 PROCEDURE — 63600175 PHARM REV CODE 636 W HCPCS: Performed by: EMERGENCY MEDICINE

## 2021-04-11 PROCEDURE — 99284 EMERGENCY DEPT VISIT MOD MDM: CPT | Mod: 25

## 2021-04-11 PROCEDURE — 81003 URINALYSIS AUTO W/O SCOPE: CPT | Performed by: EMERGENCY MEDICINE

## 2021-04-11 RX ORDER — ONDANSETRON 2 MG/ML
4 INJECTION INTRAMUSCULAR; INTRAVENOUS
Status: COMPLETED | OUTPATIENT
Start: 2021-04-11 | End: 2021-04-11

## 2021-04-11 RX ORDER — SYRING-NEEDL,DISP,INSUL,0.3 ML 29 G X1/2"
296 SYRINGE, EMPTY DISPOSABLE MISCELLANEOUS ONCE
Qty: 296 ML | Refills: 0 | Status: SHIPPED | OUTPATIENT
Start: 2021-04-11 | End: 2021-04-11

## 2021-04-11 RX ORDER — MORPHINE SULFATE 2 MG/ML
2 INJECTION, SOLUTION INTRAMUSCULAR; INTRAVENOUS
Status: DISCONTINUED | OUTPATIENT
Start: 2021-04-11 | End: 2021-04-11 | Stop reason: HOSPADM

## 2021-04-11 RX ORDER — HYOSCYAMINE SULFATE 0.125 MG
125 TABLET ORAL EVERY 4 HOURS PRN
Qty: 30 TABLET | Refills: 0 | Status: SHIPPED | OUTPATIENT
Start: 2021-04-11 | End: 2021-10-13

## 2021-04-11 RX ORDER — KETOROLAC TROMETHAMINE 30 MG/ML
15 INJECTION, SOLUTION INTRAMUSCULAR; INTRAVENOUS
Status: COMPLETED | OUTPATIENT
Start: 2021-04-11 | End: 2021-04-11

## 2021-04-11 RX ADMIN — KETOROLAC TROMETHAMINE 15 MG: 30 INJECTION, SOLUTION INTRAMUSCULAR; INTRAVENOUS at 03:04

## 2021-04-11 RX ADMIN — ONDANSETRON 4 MG: 2 INJECTION INTRAMUSCULAR; INTRAVENOUS at 02:04

## 2021-04-12 VITALS
WEIGHT: 150 LBS | BODY MASS INDEX: 27.6 KG/M2 | TEMPERATURE: 99 F | HEART RATE: 68 BPM | OXYGEN SATURATION: 98 % | RESPIRATION RATE: 20 BRPM | HEIGHT: 62 IN | SYSTOLIC BLOOD PRESSURE: 157 MMHG | DIASTOLIC BLOOD PRESSURE: 74 MMHG

## 2021-04-12 LAB
ALBUMIN SERPL BCP-MCNC: 4.1 G/DL (ref 3.5–5.2)
ALP SERPL-CCNC: 101 U/L (ref 55–135)
ALT SERPL W/O P-5'-P-CCNC: 13 U/L (ref 10–44)
ANION GAP SERPL CALC-SCNC: 9 MMOL/L (ref 8–16)
AST SERPL-CCNC: 14 U/L (ref 10–40)
BILIRUB SERPL-MCNC: 0.4 MG/DL (ref 0.1–1)
BUN SERPL-MCNC: 12 MG/DL (ref 8–23)
CALCIUM SERPL-MCNC: 9.1 MG/DL (ref 8.7–10.5)
CHLORIDE SERPL-SCNC: 104 MMOL/L (ref 95–110)
CO2 SERPL-SCNC: 29 MMOL/L (ref 23–29)
CREAT SERPL-MCNC: 0.9 MG/DL (ref 0.5–1.4)
ERYTHROCYTE [DISTWIDTH] IN BLOOD BY AUTOMATED COUNT: 14.6 % (ref 11.5–14.5)
EST. GFR  (AFRICAN AMERICAN): >60 ML/MIN/1.73 M^2
EST. GFR  (NON AFRICAN AMERICAN): >60 ML/MIN/1.73 M^2
GLUCOSE SERPL-MCNC: 123 MG/DL (ref 70–110)
HCT VFR BLD AUTO: 44.7 % (ref 37–48.5)
HGB BLD-MCNC: 15.1 G/DL (ref 12–16)
LACTATE SERPL-SCNC: 1.3 MMOL/L (ref 0.5–2.2)
LIPASE SERPL-CCNC: 9 U/L (ref 4–60)
MCH RBC QN AUTO: 29.7 PG (ref 27–31)
MCHC RBC AUTO-ENTMCNC: 33.8 G/DL (ref 32–36)
MCV RBC AUTO: 88 FL (ref 82–98)
PLATELET # BLD AUTO: 302 K/UL (ref 150–450)
PMV BLD AUTO: 10.4 FL (ref 9.2–12.9)
POTASSIUM SERPL-SCNC: 4.3 MMOL/L (ref 3.5–5.1)
PROT SERPL-MCNC: 7.3 G/DL (ref 6–8.4)
RBC # BLD AUTO: 5.09 M/UL (ref 4–5.4)
SODIUM SERPL-SCNC: 142 MMOL/L (ref 136–145)
WBC # BLD AUTO: 9.78 K/UL (ref 3.9–12.7)

## 2021-04-12 PROCEDURE — 80053 COMPREHEN METABOLIC PANEL: CPT | Performed by: EMERGENCY MEDICINE

## 2021-04-12 PROCEDURE — 25000003 PHARM REV CODE 250: Performed by: EMERGENCY MEDICINE

## 2021-04-12 PROCEDURE — 83605 ASSAY OF LACTIC ACID: CPT | Performed by: EMERGENCY MEDICINE

## 2021-04-12 PROCEDURE — 83690 ASSAY OF LIPASE: CPT | Performed by: EMERGENCY MEDICINE

## 2021-04-12 PROCEDURE — 85027 COMPLETE CBC AUTOMATED: CPT | Performed by: EMERGENCY MEDICINE

## 2021-04-12 PROCEDURE — 63600175 PHARM REV CODE 636 W HCPCS: Performed by: EMERGENCY MEDICINE

## 2021-04-12 RX ORDER — ONDANSETRON 2 MG/ML
4 INJECTION INTRAMUSCULAR; INTRAVENOUS
Status: COMPLETED | OUTPATIENT
Start: 2021-04-12 | End: 2021-04-12

## 2021-04-12 RX ORDER — ONDANSETRON 4 MG/1
4 TABLET, FILM COATED ORAL EVERY 6 HOURS
Qty: 12 TABLET | Refills: 0 | Status: SHIPPED | OUTPATIENT
Start: 2021-04-12 | End: 2021-04-15

## 2021-04-12 RX ORDER — MORPHINE SULFATE 2 MG/ML
2 INJECTION, SOLUTION INTRAMUSCULAR; INTRAVENOUS
Status: COMPLETED | OUTPATIENT
Start: 2021-04-12 | End: 2021-04-12

## 2021-04-12 RX ORDER — OMEPRAZOLE 20 MG/1
20 CAPSULE, DELAYED RELEASE ORAL DAILY
Qty: 14 CAPSULE | Refills: 0 | Status: SHIPPED | OUTPATIENT
Start: 2021-04-12 | End: 2021-04-14 | Stop reason: SDUPTHER

## 2021-04-12 RX ADMIN — LIDOCAINE HYDROCHLORIDE: 20 SOLUTION ORAL; TOPICAL at 01:04

## 2021-04-12 RX ADMIN — ONDANSETRON 4 MG: 2 INJECTION INTRAMUSCULAR; INTRAVENOUS at 01:04

## 2021-04-12 RX ADMIN — MORPHINE SULFATE 2 MG: 2 INJECTION, SOLUTION INTRAMUSCULAR; INTRAVENOUS at 01:04

## 2021-04-14 ENCOUNTER — OFFICE VISIT (OUTPATIENT)
Dept: FAMILY MEDICINE | Facility: CLINIC | Age: 71
End: 2021-04-14
Payer: MEDICARE

## 2021-04-14 VITALS
HEIGHT: 62 IN | BODY MASS INDEX: 28.52 KG/M2 | OXYGEN SATURATION: 98 % | HEART RATE: 66 BPM | DIASTOLIC BLOOD PRESSURE: 68 MMHG | SYSTOLIC BLOOD PRESSURE: 108 MMHG | WEIGHT: 155 LBS | TEMPERATURE: 96 F

## 2021-04-14 DIAGNOSIS — R73.9 HYPERGLYCEMIA: ICD-10-CM

## 2021-04-14 DIAGNOSIS — I48.0 PAROXYSMAL A-FIB: ICD-10-CM

## 2021-04-14 DIAGNOSIS — I10 ESSENTIAL HYPERTENSION: ICD-10-CM

## 2021-04-14 DIAGNOSIS — E05.90 HYPERTHYROIDISM: ICD-10-CM

## 2021-04-14 DIAGNOSIS — I35.1 NONRHEUMATIC AORTIC VALVE INSUFFICIENCY: ICD-10-CM

## 2021-04-14 DIAGNOSIS — Z78.0 POSTMENOPAUSE: ICD-10-CM

## 2021-04-14 DIAGNOSIS — R10.10 PAIN OF UPPER ABDOMEN: Primary | ICD-10-CM

## 2021-04-14 DIAGNOSIS — K59.00 CONSTIPATION, UNSPECIFIED CONSTIPATION TYPE: ICD-10-CM

## 2021-04-14 PROCEDURE — 99999 PR PBB SHADOW E&M-EST. PATIENT-LVL V: CPT | Mod: PBBFAC,,, | Performed by: INTERNAL MEDICINE

## 2021-04-14 PROCEDURE — 99204 OFFICE O/P NEW MOD 45 MIN: CPT | Mod: S$PBB,,, | Performed by: INTERNAL MEDICINE

## 2021-04-14 PROCEDURE — 99204 PR OFFICE/OUTPT VISIT, NEW, LEVL IV, 45-59 MIN: ICD-10-PCS | Mod: S$PBB,,, | Performed by: INTERNAL MEDICINE

## 2021-04-14 PROCEDURE — 99999 PR PBB SHADOW E&M-EST. PATIENT-LVL V: ICD-10-PCS | Mod: PBBFAC,,, | Performed by: INTERNAL MEDICINE

## 2021-04-14 PROCEDURE — 99215 OFFICE O/P EST HI 40 MIN: CPT | Mod: PBBFAC,PO | Performed by: INTERNAL MEDICINE

## 2021-04-14 RX ORDER — AMLODIPINE AND BENAZEPRIL HYDROCHLORIDE 10; 20 MG/1; MG/1
1 CAPSULE ORAL DAILY
Qty: 90 CAPSULE | Refills: 3 | Status: SHIPPED | OUTPATIENT
Start: 2021-04-14 | End: 2021-05-09 | Stop reason: SDUPTHER

## 2021-04-14 RX ORDER — OMEPRAZOLE 20 MG/1
20 CAPSULE, DELAYED RELEASE ORAL DAILY
Qty: 14 CAPSULE | Refills: 0 | OUTPATIENT
Start: 2021-04-14 | End: 2021-10-13

## 2021-04-14 RX ORDER — L. ACIDOPHILUS/L.BULGARICUS 100MM CELL
1 GRANULES IN PACKET (EA) ORAL 2 TIMES DAILY
Qty: 60 TABLET | Refills: 3 | Status: SHIPPED | OUTPATIENT
Start: 2021-04-14 | End: 2021-05-09 | Stop reason: SDUPTHER

## 2021-04-22 ENCOUNTER — PATIENT MESSAGE (OUTPATIENT)
Dept: FAMILY MEDICINE | Facility: CLINIC | Age: 71
End: 2021-04-22

## 2021-04-22 ENCOUNTER — HOSPITAL ENCOUNTER (OUTPATIENT)
Dept: RADIOLOGY | Facility: HOSPITAL | Age: 71
Discharge: HOME | End: 2021-04-22
Attending: INTERNAL MEDICINE
Payer: MEDICARE

## 2021-04-22 ENCOUNTER — OFFICE VISIT (OUTPATIENT)
Dept: GASTROENTEROLOGY | Facility: CLINIC | Age: 71
End: 2021-04-22
Payer: MEDICARE

## 2021-04-22 VITALS — WEIGHT: 151.88 LBS | BODY MASS INDEX: 27.95 KG/M2 | HEIGHT: 62 IN

## 2021-04-22 DIAGNOSIS — K59.00 CONSTIPATION, UNSPECIFIED CONSTIPATION TYPE: ICD-10-CM

## 2021-04-22 DIAGNOSIS — Z78.0 POSTMENOPAUSE: ICD-10-CM

## 2021-04-22 DIAGNOSIS — R10.10 PAIN OF UPPER ABDOMEN: ICD-10-CM

## 2021-04-22 PROCEDURE — 76700 US EXAM ABDOM COMPLETE: CPT | Mod: 26,,, | Performed by: RADIOLOGY

## 2021-04-22 PROCEDURE — 99999 PR PBB SHADOW E&M-EST. PATIENT-LVL III: ICD-10-PCS | Mod: PBBFAC,,, | Performed by: NURSE PRACTITIONER

## 2021-04-22 PROCEDURE — 99214 OFFICE O/P EST MOD 30 MIN: CPT | Mod: S$PBB,,, | Performed by: NURSE PRACTITIONER

## 2021-04-22 PROCEDURE — 77080 DXA BONE DENSITY AXIAL: CPT | Mod: 26,,, | Performed by: RADIOLOGY

## 2021-04-22 PROCEDURE — 99999 PR PBB SHADOW E&M-EST. PATIENT-LVL III: CPT | Mod: PBBFAC,,, | Performed by: NURSE PRACTITIONER

## 2021-04-22 PROCEDURE — 99214 PR OFFICE/OUTPT VISIT, EST, LEVL IV, 30-39 MIN: ICD-10-PCS | Mod: S$PBB,,, | Performed by: NURSE PRACTITIONER

## 2021-04-22 PROCEDURE — 76700 US ABDOMEN COMPLETE: ICD-10-PCS | Mod: 26,,, | Performed by: RADIOLOGY

## 2021-04-22 PROCEDURE — 77080 DEXA BONE DENSITY SPINE HIP: ICD-10-PCS | Mod: 26,,, | Performed by: RADIOLOGY

## 2021-04-22 PROCEDURE — 77080 DXA BONE DENSITY AXIAL: CPT | Mod: TC

## 2021-04-22 PROCEDURE — 76700 US EXAM ABDOM COMPLETE: CPT | Mod: TC

## 2021-04-22 PROCEDURE — 99213 OFFICE O/P EST LOW 20 MIN: CPT | Mod: PBBFAC,25,PO | Performed by: NURSE PRACTITIONER

## 2021-04-23 ENCOUNTER — PATIENT MESSAGE (OUTPATIENT)
Dept: FAMILY MEDICINE | Facility: CLINIC | Age: 71
End: 2021-04-23

## 2021-04-23 DIAGNOSIS — E87.6 HYPOKALEMIA: Primary | ICD-10-CM

## 2021-04-26 ENCOUNTER — PATIENT MESSAGE (OUTPATIENT)
Dept: FAMILY MEDICINE | Facility: CLINIC | Age: 71
End: 2021-04-26

## 2021-04-28 ENCOUNTER — PATIENT MESSAGE (OUTPATIENT)
Dept: FAMILY MEDICINE | Facility: CLINIC | Age: 71
End: 2021-04-28

## 2021-04-30 ENCOUNTER — TELEPHONE (OUTPATIENT)
Dept: ORTHOPEDICS | Facility: CLINIC | Age: 71
End: 2021-04-30

## 2021-05-06 ENCOUNTER — TELEPHONE (OUTPATIENT)
Dept: ORTHOPEDICS | Facility: CLINIC | Age: 71
End: 2021-05-06

## 2021-05-06 ENCOUNTER — OFFICE VISIT (OUTPATIENT)
Dept: ORTHOPEDICS | Facility: CLINIC | Age: 71
End: 2021-05-06
Payer: MEDICARE

## 2021-05-06 VITALS — WEIGHT: 151.88 LBS | BODY MASS INDEX: 27.95 KG/M2 | HEIGHT: 62 IN

## 2021-05-06 DIAGNOSIS — M75.112 NONTRAUMATIC INCOMPLETE TEAR OF LEFT ROTATOR CUFF: Primary | ICD-10-CM

## 2021-05-06 DIAGNOSIS — Z41.9 ELECTIVE SURGERY: ICD-10-CM

## 2021-05-06 PROCEDURE — 99214 PR OFFICE/OUTPT VISIT, EST, LEVL IV, 30-39 MIN: ICD-10-PCS | Mod: S$PBB,,, | Performed by: ORTHOPAEDIC SURGERY

## 2021-05-06 PROCEDURE — 99214 OFFICE O/P EST MOD 30 MIN: CPT | Mod: S$PBB,,, | Performed by: ORTHOPAEDIC SURGERY

## 2021-05-06 PROCEDURE — 99999 PR PBB SHADOW E&M-EST. PATIENT-LVL III: CPT | Mod: PBBFAC,,, | Performed by: ORTHOPAEDIC SURGERY

## 2021-05-06 PROCEDURE — 99213 OFFICE O/P EST LOW 20 MIN: CPT | Mod: PBBFAC,PN | Performed by: ORTHOPAEDIC SURGERY

## 2021-05-06 PROCEDURE — 99999 PR PBB SHADOW E&M-EST. PATIENT-LVL III: ICD-10-PCS | Mod: PBBFAC,,, | Performed by: ORTHOPAEDIC SURGERY

## 2021-05-06 RX ORDER — TRAMADOL HYDROCHLORIDE 50 MG/1
50 TABLET ORAL EVERY 6 HOURS PRN
Qty: 30 TABLET | Refills: 0 | Status: SHIPPED | OUTPATIENT
Start: 2021-05-06 | End: 2021-05-16

## 2021-05-06 RX ORDER — DICLOFENAC SODIUM 10 MG/G
2 GEL TOPICAL 3 TIMES DAILY
Qty: 1 TUBE | Refills: 3 | Status: SHIPPED | OUTPATIENT
Start: 2021-05-06 | End: 2021-10-13

## 2021-05-11 ENCOUNTER — ANESTHESIA EVENT (OUTPATIENT)
Dept: SURGERY | Facility: HOSPITAL | Age: 71
End: 2021-05-11
Payer: MEDICARE

## 2021-05-25 ENCOUNTER — HOSPITAL ENCOUNTER (OUTPATIENT)
Dept: PREADMISSION TESTING | Facility: HOSPITAL | Age: 71
Discharge: HOME | End: 2021-05-25
Attending: ORTHOPAEDIC SURGERY
Payer: MEDICARE

## 2021-05-25 ENCOUNTER — CLINICAL SUPPORT (OUTPATIENT)
Dept: LAB | Facility: HOSPITAL | Age: 71
End: 2021-05-25
Attending: ORTHOPAEDIC SURGERY
Payer: MEDICARE

## 2021-05-25 VITALS
HEIGHT: 62 IN | SYSTOLIC BLOOD PRESSURE: 137 MMHG | DIASTOLIC BLOOD PRESSURE: 66 MMHG | BODY MASS INDEX: 28.71 KG/M2 | OXYGEN SATURATION: 98 % | HEART RATE: 70 BPM | RESPIRATION RATE: 16 BRPM | WEIGHT: 156 LBS

## 2021-05-25 DIAGNOSIS — M75.112 NONTRAUMATIC INCOMPLETE TEAR OF LEFT ROTATOR CUFF: ICD-10-CM

## 2021-05-25 DIAGNOSIS — M75.112 NONTRAUMATIC INCOMPLETE TEAR OF LEFT ROTATOR CUFF: Primary | ICD-10-CM

## 2021-05-25 PROCEDURE — 93010 ELECTROCARDIOGRAM REPORT: CPT | Mod: ,,, | Performed by: INTERNAL MEDICINE

## 2021-05-25 PROCEDURE — 93005 ELECTROCARDIOGRAM TRACING: CPT

## 2021-05-25 PROCEDURE — 93010 EKG 12-LEAD: ICD-10-PCS | Mod: ,,, | Performed by: INTERNAL MEDICINE

## 2021-05-25 RX ORDER — SODIUM CHLORIDE, SODIUM LACTATE, POTASSIUM CHLORIDE, CALCIUM CHLORIDE 600; 310; 30; 20 MG/100ML; MG/100ML; MG/100ML; MG/100ML
INJECTION, SOLUTION INTRAVENOUS CONTINUOUS
Status: CANCELLED | OUTPATIENT
Start: 2021-05-25

## 2021-05-25 RX ORDER — LIDOCAINE HYDROCHLORIDE 10 MG/ML
1 INJECTION, SOLUTION EPIDURAL; INFILTRATION; INTRACAUDAL; PERINEURAL ONCE
Status: CANCELLED | OUTPATIENT
Start: 2021-05-25 | End: 2021-05-25

## 2021-05-28 ENCOUNTER — ANESTHESIA (OUTPATIENT)
Dept: SURGERY | Facility: HOSPITAL | Age: 71
End: 2021-05-28
Payer: MEDICARE

## 2021-05-28 ENCOUNTER — HOSPITAL ENCOUNTER (OUTPATIENT)
Facility: HOSPITAL | Age: 71
Discharge: HOME | End: 2021-05-28
Attending: ORTHOPAEDIC SURGERY | Admitting: ORTHOPAEDIC SURGERY
Payer: MEDICARE

## 2021-05-28 VITALS
DIASTOLIC BLOOD PRESSURE: 68 MMHG | RESPIRATION RATE: 17 BRPM | OXYGEN SATURATION: 99 % | WEIGHT: 153 LBS | TEMPERATURE: 97 F | SYSTOLIC BLOOD PRESSURE: 156 MMHG | HEIGHT: 62 IN | HEART RATE: 80 BPM | BODY MASS INDEX: 28.16 KG/M2

## 2021-05-28 DIAGNOSIS — M75.112 NONTRAUMATIC INCOMPLETE TEAR OF LEFT ROTATOR CUFF: ICD-10-CM

## 2021-05-28 PROCEDURE — 29827 PR SHLDR ARTHROSCOP,SURG,W/ROTAT CUFF REPR: ICD-10-PCS | Mod: AS,LT,, | Performed by: PHYSICIAN ASSISTANT

## 2021-05-28 PROCEDURE — 29826 PR SHLDR ARTHROSCOP,PART ACROMIOPLAS: ICD-10-PCS | Mod: LT,,, | Performed by: ORTHOPAEDIC SURGERY

## 2021-05-28 PROCEDURE — 37000008 HC ANESTHESIA 1ST 15 MINUTES: Performed by: ORTHOPAEDIC SURGERY

## 2021-05-28 PROCEDURE — 29827 PR SHLDR ARTHROSCOP,SURG,W/ROTAT CUFF REPR: ICD-10-PCS | Mod: LT,,, | Performed by: ORTHOPAEDIC SURGERY

## 2021-05-28 PROCEDURE — 29824 SHO ARTHRS SRG DSTL CLAVICLC: CPT | Mod: 51,LT,, | Performed by: ORTHOPAEDIC SURGERY

## 2021-05-28 PROCEDURE — 37000009 HC ANESTHESIA EA ADD 15 MINS: Performed by: ORTHOPAEDIC SURGERY

## 2021-05-28 PROCEDURE — 36000710: Performed by: ORTHOPAEDIC SURGERY

## 2021-05-28 PROCEDURE — 63600175 PHARM REV CODE 636 W HCPCS: Performed by: ORTHOPAEDIC SURGERY

## 2021-05-28 PROCEDURE — 29824 SHO ARTHRS SRG DSTL CLAVICLC: CPT | Mod: AS,51,LT, | Performed by: PHYSICIAN ASSISTANT

## 2021-05-28 PROCEDURE — 29827 SHO ARTHRS SRG RT8TR CUF RPR: CPT | Mod: AS,LT,, | Performed by: PHYSICIAN ASSISTANT

## 2021-05-28 PROCEDURE — 63600175 PHARM REV CODE 636 W HCPCS: Performed by: STUDENT IN AN ORGANIZED HEALTH CARE EDUCATION/TRAINING PROGRAM

## 2021-05-28 PROCEDURE — 29824 PR SHLDR ARTHROSCOP,SURG,DIS CLAVICULECTOMY: ICD-10-PCS | Mod: AS,51,LT, | Performed by: PHYSICIAN ASSISTANT

## 2021-05-28 PROCEDURE — 71000015 HC POSTOP RECOV 1ST HR: Performed by: ORTHOPAEDIC SURGERY

## 2021-05-28 PROCEDURE — C9290 INJ, BUPIVACAINE LIPOSOME: HCPCS | Performed by: STUDENT IN AN ORGANIZED HEALTH CARE EDUCATION/TRAINING PROGRAM

## 2021-05-28 PROCEDURE — 29826 SHO ARTHRS SRG DECOMPRESSION: CPT | Mod: LT,,, | Performed by: ORTHOPAEDIC SURGERY

## 2021-05-28 PROCEDURE — 29826 PR SHLDR ARTHROSCOP,PART ACROMIOPLAS: ICD-10-PCS | Mod: AS,LT,, | Performed by: PHYSICIAN ASSISTANT

## 2021-05-28 PROCEDURE — 29827 SHO ARTHRS SRG RT8TR CUF RPR: CPT | Mod: LT,,, | Performed by: ORTHOPAEDIC SURGERY

## 2021-05-28 PROCEDURE — 36000711: Performed by: ORTHOPAEDIC SURGERY

## 2021-05-28 PROCEDURE — 29824 PR SHLDR ARTHROSCOP,SURG,DIS CLAVICULECTOMY: ICD-10-PCS | Mod: 51,LT,, | Performed by: ORTHOPAEDIC SURGERY

## 2021-05-28 PROCEDURE — 71000016 HC POSTOP RECOV ADDL HR: Performed by: ORTHOPAEDIC SURGERY

## 2021-05-28 PROCEDURE — 25000003 PHARM REV CODE 250: Performed by: STUDENT IN AN ORGANIZED HEALTH CARE EDUCATION/TRAINING PROGRAM

## 2021-05-28 PROCEDURE — C1713 ANCHOR/SCREW BN/BN,TIS/BN: HCPCS | Performed by: ORTHOPAEDIC SURGERY

## 2021-05-28 PROCEDURE — 71000033 HC RECOVERY, INTIAL HOUR: Performed by: ORTHOPAEDIC SURGERY

## 2021-05-28 PROCEDURE — 63600175 PHARM REV CODE 636 W HCPCS: Performed by: NURSE PRACTITIONER

## 2021-05-28 PROCEDURE — 29826 SHO ARTHRS SRG DECOMPRESSION: CPT | Mod: AS,LT,, | Performed by: PHYSICIAN ASSISTANT

## 2021-05-28 PROCEDURE — 27201423 OPTIME MED/SURG SUP & DEVICES STERILE SUPPLY: Performed by: ORTHOPAEDIC SURGERY

## 2021-05-28 DEVICE — ANCHOR SUT KNOTLESS MAG 2: Type: IMPLANTABLE DEVICE | Site: SHOULDER | Status: FUNCTIONAL

## 2021-05-28 RX ORDER — SODIUM CHLORIDE, SODIUM LACTATE, POTASSIUM CHLORIDE, CALCIUM CHLORIDE 600; 310; 30; 20 MG/100ML; MG/100ML; MG/100ML; MG/100ML
INJECTION, SOLUTION INTRAVENOUS CONTINUOUS PRN
Status: DISCONTINUED | OUTPATIENT
Start: 2021-05-28 | End: 2021-05-28

## 2021-05-28 RX ORDER — OXYCODONE HYDROCHLORIDE 5 MG/1
10 TABLET ORAL EVERY 4 HOURS PRN
Status: DISCONTINUED | OUTPATIENT
Start: 2021-05-28 | End: 2021-05-28 | Stop reason: HOSPADM

## 2021-05-28 RX ORDER — CLINDAMYCIN PHOSPHATE 900 MG/50ML
900 INJECTION, SOLUTION INTRAVENOUS
Status: DISCONTINUED | OUTPATIENT
Start: 2021-05-28 | End: 2021-05-28 | Stop reason: HOSPADM

## 2021-05-28 RX ORDER — MIDAZOLAM HYDROCHLORIDE 1 MG/ML
INJECTION, SOLUTION INTRAMUSCULAR; INTRAVENOUS
Status: DISCONTINUED | OUTPATIENT
Start: 2021-05-28 | End: 2021-05-28

## 2021-05-28 RX ORDER — BUPIVACAINE HYDROCHLORIDE 2.5 MG/ML
INJECTION, SOLUTION EPIDURAL; INFILTRATION; INTRACAUDAL
Status: DISCONTINUED | OUTPATIENT
Start: 2021-05-28 | End: 2021-05-28

## 2021-05-28 RX ORDER — KETOROLAC TROMETHAMINE 30 MG/ML
15 INJECTION, SOLUTION INTRAMUSCULAR; INTRAVENOUS ONCE
Status: COMPLETED | OUTPATIENT
Start: 2021-05-28 | End: 2021-05-28

## 2021-05-28 RX ORDER — ONDANSETRON 8 MG/1
8 TABLET, ORALLY DISINTEGRATING ORAL EVERY 8 HOURS PRN
Status: DISCONTINUED | OUTPATIENT
Start: 2021-05-28 | End: 2021-05-28 | Stop reason: HOSPADM

## 2021-05-28 RX ORDER — SODIUM CHLORIDE, SODIUM LACTATE, POTASSIUM CHLORIDE, CALCIUM CHLORIDE 600; 310; 30; 20 MG/100ML; MG/100ML; MG/100ML; MG/100ML
INJECTION, SOLUTION INTRAVENOUS CONTINUOUS
Status: DISCONTINUED | OUTPATIENT
Start: 2021-05-28 | End: 2021-05-28 | Stop reason: HOSPADM

## 2021-05-28 RX ORDER — LIDOCAINE HCL/PF 100 MG/5ML
SYRINGE (ML) INTRAVENOUS
Status: DISCONTINUED | OUTPATIENT
Start: 2021-05-28 | End: 2021-05-28

## 2021-05-28 RX ORDER — CEFAZOLIN SODIUM 1 G/3ML
INJECTION, POWDER, FOR SOLUTION INTRAMUSCULAR; INTRAVENOUS
Status: DISCONTINUED | OUTPATIENT
Start: 2021-05-28 | End: 2021-05-28

## 2021-05-28 RX ORDER — FENTANYL CITRATE 50 UG/ML
INJECTION, SOLUTION INTRAMUSCULAR; INTRAVENOUS
Status: DISCONTINUED | OUTPATIENT
Start: 2021-05-28 | End: 2021-05-28

## 2021-05-28 RX ORDER — OXYCODONE AND ACETAMINOPHEN 7.5; 325 MG/1; MG/1
1 TABLET ORAL EVERY 4 HOURS PRN
Qty: 40 TABLET | Refills: 0 | Status: SHIPPED | OUTPATIENT
Start: 2021-05-28 | End: 2021-06-10 | Stop reason: SDUPTHER

## 2021-05-28 RX ORDER — NEOSTIGMINE METHYLSULFATE 1 MG/ML
INJECTION, SOLUTION INTRAVENOUS
Status: DISCONTINUED | OUTPATIENT
Start: 2021-05-28 | End: 2021-05-28

## 2021-05-28 RX ORDER — ROCURONIUM BROMIDE 10 MG/ML
INJECTION, SOLUTION INTRAVENOUS
Status: DISCONTINUED | OUTPATIENT
Start: 2021-05-28 | End: 2021-05-28

## 2021-05-28 RX ORDER — LIDOCAINE HYDROCHLORIDE 10 MG/ML
1 INJECTION, SOLUTION EPIDURAL; INFILTRATION; INTRACAUDAL; PERINEURAL ONCE
Status: DISCONTINUED | OUTPATIENT
Start: 2021-05-28 | End: 2021-05-28 | Stop reason: HOSPADM

## 2021-05-28 RX ORDER — ONDANSETRON 2 MG/ML
INJECTION INTRAMUSCULAR; INTRAVENOUS
Status: DISCONTINUED | OUTPATIENT
Start: 2021-05-28 | End: 2021-05-28

## 2021-05-28 RX ORDER — EPINEPHRINE 1 MG/ML
INJECTION, SOLUTION INTRACARDIAC; INTRAMUSCULAR; INTRAVENOUS; SUBCUTANEOUS
Status: DISCONTINUED | OUTPATIENT
Start: 2021-05-28 | End: 2021-05-28 | Stop reason: HOSPADM

## 2021-05-28 RX ORDER — ACETAMINOPHEN 325 MG/1
650 TABLET ORAL EVERY 4 HOURS PRN
Status: DISCONTINUED | OUTPATIENT
Start: 2021-05-28 | End: 2021-05-28 | Stop reason: HOSPADM

## 2021-05-28 RX ORDER — HYDROMORPHONE HYDROCHLORIDE 2 MG/ML
0.5 INJECTION, SOLUTION INTRAMUSCULAR; INTRAVENOUS; SUBCUTANEOUS EVERY 5 MIN PRN
Status: DISCONTINUED | OUTPATIENT
Start: 2021-05-28 | End: 2021-05-28 | Stop reason: HOSPADM

## 2021-05-28 RX ORDER — PHENYLEPHRINE HYDROCHLORIDE 10 MG/ML
INJECTION INTRAVENOUS
Status: DISCONTINUED | OUTPATIENT
Start: 2021-05-28 | End: 2021-05-28

## 2021-05-28 RX ORDER — SODIUM CHLORIDE, SODIUM LACTATE, POTASSIUM CHLORIDE, CALCIUM CHLORIDE 600; 310; 30; 20 MG/100ML; MG/100ML; MG/100ML; MG/100ML
125 INJECTION, SOLUTION INTRAVENOUS CONTINUOUS
Status: DISCONTINUED | OUTPATIENT
Start: 2021-05-28 | End: 2021-05-28 | Stop reason: HOSPADM

## 2021-05-28 RX ORDER — OXYCODONE HYDROCHLORIDE 5 MG/1
5 TABLET ORAL
Status: DISCONTINUED | OUTPATIENT
Start: 2021-05-28 | End: 2021-05-28 | Stop reason: HOSPADM

## 2021-05-28 RX ORDER — PROPOFOL 10 MG/ML
VIAL (ML) INTRAVENOUS
Status: DISCONTINUED | OUTPATIENT
Start: 2021-05-28 | End: 2021-05-28

## 2021-05-28 RX ADMIN — SODIUM CHLORIDE, SODIUM LACTATE, POTASSIUM CHLORIDE, AND CALCIUM CHLORIDE: .6; .31; .03; .02 INJECTION, SOLUTION INTRAVENOUS at 09:05

## 2021-05-28 RX ADMIN — KETOROLAC TROMETHAMINE 15 MG: 30 INJECTION, SOLUTION INTRAMUSCULAR; INTRAVENOUS at 12:05

## 2021-05-28 RX ADMIN — SODIUM CHLORIDE, SODIUM LACTATE, POTASSIUM CHLORIDE, AND CALCIUM CHLORIDE: .6; .31; .03; .02 INJECTION, SOLUTION INTRAVENOUS at 10:05

## 2021-05-28 RX ADMIN — PHENYLEPHRINE HYDROCHLORIDE 50 MCG: 10 INJECTION INTRAVENOUS at 11:05

## 2021-05-28 RX ADMIN — MIDAZOLAM 2 MG: 1 INJECTION INTRAMUSCULAR; INTRAVENOUS at 10:05

## 2021-05-28 RX ADMIN — ONDANSETRON 4 MG: 2 INJECTION, SOLUTION INTRAMUSCULAR; INTRAVENOUS at 12:05

## 2021-05-28 RX ADMIN — ROCURONIUM BROMIDE 40 MG: 10 INJECTION, SOLUTION INTRAVENOUS at 10:05

## 2021-05-28 RX ADMIN — CEFAZOLIN 2 G: 330 INJECTION, POWDER, FOR SOLUTION INTRAMUSCULAR; INTRAVENOUS at 11:05

## 2021-05-28 RX ADMIN — PHENYLEPHRINE HYDROCHLORIDE 100 MCG: 10 INJECTION INTRAVENOUS at 11:05

## 2021-05-28 RX ADMIN — HYDROMORPHONE HYDROCHLORIDE 0.5 MG: 2 INJECTION, SOLUTION INTRAMUSCULAR; INTRAVENOUS; SUBCUTANEOUS at 12:05

## 2021-05-28 RX ADMIN — BUPIVACAINE 10 ML: 13.3 INJECTION, SUSPENSION, LIPOSOMAL INFILTRATION at 10:05

## 2021-05-28 RX ADMIN — FENTANYL CITRATE 50 MCG: 50 INJECTION, SOLUTION INTRAMUSCULAR; INTRAVENOUS at 10:05

## 2021-05-28 RX ADMIN — BUPIVACAINE HYDROCHLORIDE 10 ML: 2.5 INJECTION, SOLUTION EPIDURAL; INFILTRATION; INTRACAUDAL; PERINEURAL at 10:05

## 2021-05-28 RX ADMIN — NEOSTIGMINE METHYLSULFATE 4 MG: 1 INJECTION INTRAVENOUS at 12:05

## 2021-05-28 RX ADMIN — LIDOCAINE HYDROCHLORIDE 80 MG: 20 INJECTION, SOLUTION INTRAVENOUS at 10:05

## 2021-05-28 RX ADMIN — GLYCOPYRROLATE 0.6 MG: 0.2 INJECTION, SOLUTION INTRAMUSCULAR; INTRAVITREAL at 12:05

## 2021-05-28 RX ADMIN — PROPOFOL 80 MG: 10 INJECTION, EMULSION INTRAVENOUS at 10:05

## 2021-06-01 ENCOUNTER — OFFICE VISIT (OUTPATIENT)
Dept: FAMILY MEDICINE | Facility: CLINIC | Age: 71
End: 2021-06-01
Payer: MEDICARE

## 2021-06-01 VITALS
BODY MASS INDEX: 29.01 KG/M2 | HEART RATE: 83 BPM | OXYGEN SATURATION: 98 % | TEMPERATURE: 98 F | DIASTOLIC BLOOD PRESSURE: 82 MMHG | SYSTOLIC BLOOD PRESSURE: 126 MMHG | HEIGHT: 62 IN | WEIGHT: 157.63 LBS

## 2021-06-01 DIAGNOSIS — E05.90 HYPERTHYROIDISM: ICD-10-CM

## 2021-06-01 DIAGNOSIS — Z98.890 S/P LEFT ROTATOR CUFF REPAIR: ICD-10-CM

## 2021-06-01 DIAGNOSIS — I48.91 ATRIAL FIBRILLATION WITH RVR: Primary | ICD-10-CM

## 2021-06-01 DIAGNOSIS — H04.123 DRY EYE SYNDROME OF BOTH EYES: ICD-10-CM

## 2021-06-01 DIAGNOSIS — I10 ESSENTIAL HYPERTENSION: ICD-10-CM

## 2021-06-01 PROCEDURE — 99999 PR PBB SHADOW E&M-EST. PATIENT-LVL III: CPT | Mod: PBBFAC,,, | Performed by: INTERNAL MEDICINE

## 2021-06-01 PROCEDURE — 99214 PR OFFICE/OUTPT VISIT, EST, LEVL IV, 30-39 MIN: ICD-10-PCS | Mod: S$PBB,,, | Performed by: INTERNAL MEDICINE

## 2021-06-01 PROCEDURE — 99999 PR PBB SHADOW E&M-EST. PATIENT-LVL III: ICD-10-PCS | Mod: PBBFAC,,, | Performed by: INTERNAL MEDICINE

## 2021-06-01 PROCEDURE — 99214 OFFICE O/P EST MOD 30 MIN: CPT | Mod: S$PBB,,, | Performed by: INTERNAL MEDICINE

## 2021-06-01 PROCEDURE — 99213 OFFICE O/P EST LOW 20 MIN: CPT | Mod: PBBFAC,PO | Performed by: INTERNAL MEDICINE

## 2021-06-01 RX ORDER — DONEPEZIL HYDROCHLORIDE 5 MG/1
5 TABLET, FILM COATED ORAL NIGHTLY
Qty: 90 TABLET | Refills: 1 | Status: SHIPPED | OUTPATIENT
Start: 2021-06-01 | End: 2021-06-02 | Stop reason: SDUPTHER

## 2021-06-02 ENCOUNTER — TELEPHONE (OUTPATIENT)
Dept: NEUROLOGY | Facility: CLINIC | Age: 71
End: 2021-06-02

## 2021-06-02 DIAGNOSIS — I48.0 PAROXYSMAL A-FIB: ICD-10-CM

## 2021-06-02 RX ORDER — DONEPEZIL HYDROCHLORIDE 5 MG/1
5 TABLET, FILM COATED ORAL NIGHTLY
Qty: 90 TABLET | Refills: 1 | Status: SHIPPED | OUTPATIENT
Start: 2021-06-02 | End: 2024-02-05

## 2021-06-02 RX ORDER — DONEPEZIL HYDROCHLORIDE 5 MG/1
5 TABLET, FILM COATED ORAL NIGHTLY
Qty: 90 TABLET | Refills: 1 | Status: SHIPPED | OUTPATIENT
Start: 2021-06-02 | End: 2021-06-02 | Stop reason: SDUPTHER

## 2021-06-09 LAB — HBA1C MFR BLD: 5.1 %

## 2021-06-10 ENCOUNTER — OFFICE VISIT (OUTPATIENT)
Dept: ORTHOPEDICS | Facility: CLINIC | Age: 71
End: 2021-06-10
Payer: MEDICARE

## 2021-06-10 VITALS
BODY MASS INDEX: 29.01 KG/M2 | HEART RATE: 78 BPM | HEIGHT: 62 IN | WEIGHT: 157.63 LBS | SYSTOLIC BLOOD PRESSURE: 121 MMHG | DIASTOLIC BLOOD PRESSURE: 80 MMHG

## 2021-06-10 DIAGNOSIS — Z98.890 STATUS POST ROTATOR CUFF REPAIR: Primary | ICD-10-CM

## 2021-06-10 PROCEDURE — 99999 PR PBB SHADOW E&M-EST. PATIENT-LVL IV: CPT | Mod: PBBFAC,,, | Performed by: ORTHOPAEDIC SURGERY

## 2021-06-10 PROCEDURE — 99024 PR POST-OP FOLLOW-UP VISIT: ICD-10-PCS | Mod: POP,,, | Performed by: ORTHOPAEDIC SURGERY

## 2021-06-10 PROCEDURE — 99024 POSTOP FOLLOW-UP VISIT: CPT | Mod: POP,,, | Performed by: ORTHOPAEDIC SURGERY

## 2021-06-10 PROCEDURE — 99214 OFFICE O/P EST MOD 30 MIN: CPT | Mod: PBBFAC,PN | Performed by: ORTHOPAEDIC SURGERY

## 2021-06-10 PROCEDURE — 99999 PR PBB SHADOW E&M-EST. PATIENT-LVL IV: ICD-10-PCS | Mod: PBBFAC,,, | Performed by: ORTHOPAEDIC SURGERY

## 2021-06-10 RX ORDER — OXYCODONE AND ACETAMINOPHEN 7.5; 325 MG/1; MG/1
1 TABLET ORAL EVERY 8 HOURS PRN
Qty: 40 TABLET | Refills: 0 | Status: SHIPPED | OUTPATIENT
Start: 2021-06-10 | End: 2022-09-06

## 2021-06-16 ENCOUNTER — CLINICAL SUPPORT (OUTPATIENT)
Dept: REHABILITATION | Facility: HOSPITAL | Age: 71
End: 2021-06-16
Payer: MEDICARE

## 2021-06-16 DIAGNOSIS — Z74.09 STIFFNESS DUE TO IMMOBILITY: ICD-10-CM

## 2021-06-16 DIAGNOSIS — Z98.890 STATUS POST ROTATOR CUFF REPAIR: ICD-10-CM

## 2021-06-16 DIAGNOSIS — M25.60 STIFFNESS DUE TO IMMOBILITY: ICD-10-CM

## 2021-06-16 DIAGNOSIS — M79.602 PAIN OF LEFT UPPER EXTREMITY: ICD-10-CM

## 2021-06-16 DIAGNOSIS — R53.1 WEAKNESS: ICD-10-CM

## 2021-06-16 PROCEDURE — 97165 OT EVAL LOW COMPLEX 30 MIN: CPT | Mod: PN

## 2021-06-16 PROCEDURE — 97110 THERAPEUTIC EXERCISES: CPT | Mod: PN

## 2021-06-23 ENCOUNTER — CLINICAL SUPPORT (OUTPATIENT)
Dept: REHABILITATION | Facility: HOSPITAL | Age: 71
End: 2021-06-23
Payer: MEDICARE

## 2021-06-23 DIAGNOSIS — M25.60 STIFFNESS DUE TO IMMOBILITY: ICD-10-CM

## 2021-06-23 DIAGNOSIS — Z74.09 STIFFNESS DUE TO IMMOBILITY: ICD-10-CM

## 2021-06-23 DIAGNOSIS — R53.1 WEAKNESS: ICD-10-CM

## 2021-06-23 DIAGNOSIS — M79.602 PAIN OF LEFT UPPER EXTREMITY: ICD-10-CM

## 2021-06-23 PROCEDURE — 97140 MANUAL THERAPY 1/> REGIONS: CPT | Mod: PN

## 2021-06-23 PROCEDURE — 97110 THERAPEUTIC EXERCISES: CPT | Mod: PN

## 2021-06-25 ENCOUNTER — PATIENT OUTREACH (OUTPATIENT)
Dept: ADMINISTRATIVE | Facility: HOSPITAL | Age: 71
End: 2021-06-25

## 2021-06-29 ENCOUNTER — CLINICAL SUPPORT (OUTPATIENT)
Dept: REHABILITATION | Facility: HOSPITAL | Age: 71
End: 2021-06-29
Payer: MEDICARE

## 2021-06-29 DIAGNOSIS — M25.60 STIFFNESS DUE TO IMMOBILITY: ICD-10-CM

## 2021-06-29 DIAGNOSIS — Z74.09 STIFFNESS DUE TO IMMOBILITY: ICD-10-CM

## 2021-06-29 DIAGNOSIS — M79.602 PAIN OF LEFT UPPER EXTREMITY: ICD-10-CM

## 2021-06-29 DIAGNOSIS — R53.1 WEAKNESS: ICD-10-CM

## 2021-06-29 PROCEDURE — 97140 MANUAL THERAPY 1/> REGIONS: CPT | Mod: PN

## 2021-06-29 PROCEDURE — 97110 THERAPEUTIC EXERCISES: CPT | Mod: PN

## 2021-07-09 ENCOUNTER — PATIENT OUTREACH (OUTPATIENT)
Dept: ADMINISTRATIVE | Facility: OTHER | Age: 71
End: 2021-07-09

## 2021-07-09 ENCOUNTER — CLINICAL SUPPORT (OUTPATIENT)
Dept: REHABILITATION | Facility: HOSPITAL | Age: 71
End: 2021-07-09
Payer: MEDICARE

## 2021-07-09 DIAGNOSIS — R53.1 WEAKNESS: ICD-10-CM

## 2021-07-09 DIAGNOSIS — Z74.09 STIFFNESS DUE TO IMMOBILITY: ICD-10-CM

## 2021-07-09 DIAGNOSIS — Z12.31 ENCOUNTER FOR SCREENING MAMMOGRAM FOR MALIGNANT NEOPLASM OF BREAST: ICD-10-CM

## 2021-07-09 DIAGNOSIS — M25.60 STIFFNESS DUE TO IMMOBILITY: ICD-10-CM

## 2021-07-09 DIAGNOSIS — M79.602 PAIN OF LEFT UPPER EXTREMITY: ICD-10-CM

## 2021-07-09 DIAGNOSIS — Z12.11 ENCOUNTER FOR FIT (FECAL IMMUNOCHEMICAL TEST) SCREENING: Primary | ICD-10-CM

## 2021-07-09 PROCEDURE — 97140 MANUAL THERAPY 1/> REGIONS: CPT | Mod: PN

## 2021-07-09 PROCEDURE — 97110 THERAPEUTIC EXERCISES: CPT | Mod: PN

## 2021-07-11 PROBLEM — I48.91 ATRIAL FIBRILLATION WITH RVR: Status: RESOLVED | Noted: 2019-12-25 | Resolved: 2021-07-11

## 2021-07-11 PROBLEM — I48.91 NEW ONSET A-FIB: Status: RESOLVED | Noted: 2019-12-26 | Resolved: 2021-07-11

## 2021-07-12 ENCOUNTER — PATIENT MESSAGE (OUTPATIENT)
Dept: ADMINISTRATIVE | Facility: OTHER | Age: 71
End: 2021-07-12

## 2021-07-12 ENCOUNTER — OFFICE VISIT (OUTPATIENT)
Dept: ORTHOPEDICS | Facility: CLINIC | Age: 71
End: 2021-07-12
Payer: MEDICARE

## 2021-07-12 ENCOUNTER — OFFICE VISIT (OUTPATIENT)
Dept: CARDIOLOGY | Facility: CLINIC | Age: 71
End: 2021-07-12
Payer: MEDICARE

## 2021-07-12 VITALS
BODY MASS INDEX: 28.23 KG/M2 | HEART RATE: 79 BPM | SYSTOLIC BLOOD PRESSURE: 124 MMHG | WEIGHT: 154.31 LBS | OXYGEN SATURATION: 98 % | DIASTOLIC BLOOD PRESSURE: 78 MMHG

## 2021-07-12 VITALS — BODY MASS INDEX: 28.39 KG/M2 | WEIGHT: 154.31 LBS | HEIGHT: 62 IN

## 2021-07-12 DIAGNOSIS — M75.122 NONTRAUMATIC COMPLETE TEAR OF LEFT ROTATOR CUFF: Primary | ICD-10-CM

## 2021-07-12 DIAGNOSIS — I48.0 PAROXYSMAL A-FIB: Primary | ICD-10-CM

## 2021-07-12 DIAGNOSIS — I10 ESSENTIAL HYPERTENSION: ICD-10-CM

## 2021-07-12 DIAGNOSIS — E07.9 THYROID EYE DISEASE: ICD-10-CM

## 2021-07-12 DIAGNOSIS — I35.1 NONRHEUMATIC AORTIC VALVE INSUFFICIENCY: ICD-10-CM

## 2021-07-12 DIAGNOSIS — H57.89 THYROID EYE DISEASE: ICD-10-CM

## 2021-07-12 PROCEDURE — 99999 PR PBB SHADOW E&M-EST. PATIENT-LVL III: CPT | Mod: PBBFAC,,, | Performed by: ORTHOPAEDIC SURGERY

## 2021-07-12 PROCEDURE — 99999 PR PBB SHADOW E&M-EST. PATIENT-LVL III: CPT | Mod: PBBFAC,,, | Performed by: INTERNAL MEDICINE

## 2021-07-12 PROCEDURE — 99213 OFFICE O/P EST LOW 20 MIN: CPT | Mod: PBBFAC,PO | Performed by: INTERNAL MEDICINE

## 2021-07-12 PROCEDURE — 99999 PR PBB SHADOW E&M-EST. PATIENT-LVL III: ICD-10-PCS | Mod: PBBFAC,,, | Performed by: ORTHOPAEDIC SURGERY

## 2021-07-12 PROCEDURE — 99999 PR PBB SHADOW E&M-EST. PATIENT-LVL III: ICD-10-PCS | Mod: PBBFAC,,, | Performed by: INTERNAL MEDICINE

## 2021-07-12 PROCEDURE — 99213 OFFICE O/P EST LOW 20 MIN: CPT | Mod: PBBFAC,27,PN | Performed by: ORTHOPAEDIC SURGERY

## 2021-07-12 PROCEDURE — 99024 PR POST-OP FOLLOW-UP VISIT: ICD-10-PCS | Mod: POP,,, | Performed by: ORTHOPAEDIC SURGERY

## 2021-07-12 PROCEDURE — 99214 PR OFFICE/OUTPT VISIT, EST, LEVL IV, 30-39 MIN: ICD-10-PCS | Mod: S$PBB,,, | Performed by: INTERNAL MEDICINE

## 2021-07-12 PROCEDURE — 99214 OFFICE O/P EST MOD 30 MIN: CPT | Mod: S$PBB,,, | Performed by: INTERNAL MEDICINE

## 2021-07-12 PROCEDURE — 99024 POSTOP FOLLOW-UP VISIT: CPT | Mod: POP,,, | Performed by: ORTHOPAEDIC SURGERY

## 2021-07-12 RX ORDER — OXYCODONE AND ACETAMINOPHEN 5; 325 MG/1; MG/1
1 TABLET ORAL
Qty: 30 TABLET | Refills: 0 | Status: SHIPPED | OUTPATIENT
Start: 2021-07-12 | End: 2021-08-11

## 2021-07-14 ENCOUNTER — CLINICAL SUPPORT (OUTPATIENT)
Dept: REHABILITATION | Facility: HOSPITAL | Age: 71
End: 2021-07-14
Payer: MEDICARE

## 2021-07-14 DIAGNOSIS — M79.602 PAIN OF LEFT UPPER EXTREMITY: ICD-10-CM

## 2021-07-14 DIAGNOSIS — Z74.09 STIFFNESS DUE TO IMMOBILITY: ICD-10-CM

## 2021-07-14 DIAGNOSIS — M25.60 STIFFNESS DUE TO IMMOBILITY: ICD-10-CM

## 2021-07-14 DIAGNOSIS — R53.1 WEAKNESS: ICD-10-CM

## 2021-07-14 PROCEDURE — 97140 MANUAL THERAPY 1/> REGIONS: CPT | Mod: PN

## 2021-07-14 PROCEDURE — 97110 THERAPEUTIC EXERCISES: CPT | Mod: PN

## 2021-07-16 ENCOUNTER — HOSPITAL ENCOUNTER (OUTPATIENT)
Dept: RADIOLOGY | Facility: HOSPITAL | Age: 71
Discharge: HOME | End: 2021-07-16
Attending: INTERNAL MEDICINE
Payer: MEDICARE

## 2021-07-16 DIAGNOSIS — Z12.31 ENCOUNTER FOR SCREENING MAMMOGRAM FOR MALIGNANT NEOPLASM OF BREAST: ICD-10-CM

## 2021-07-16 PROCEDURE — 77067 SCR MAMMO BI INCL CAD: CPT | Mod: TC

## 2021-07-16 PROCEDURE — 77067 MAMMO DIGITAL SCREENING BILAT WITH TOMO: ICD-10-PCS | Mod: 26,,, | Performed by: RADIOLOGY

## 2021-07-16 PROCEDURE — 77067 SCR MAMMO BI INCL CAD: CPT | Mod: 26,,, | Performed by: RADIOLOGY

## 2021-07-16 PROCEDURE — 77063 MAMMO DIGITAL SCREENING BILAT WITH TOMO: ICD-10-PCS | Mod: 26,,, | Performed by: RADIOLOGY

## 2021-07-16 PROCEDURE — 77063 BREAST TOMOSYNTHESIS BI: CPT | Mod: 26,,, | Performed by: RADIOLOGY

## 2021-07-19 ENCOUNTER — CLINICAL SUPPORT (OUTPATIENT)
Dept: REHABILITATION | Facility: HOSPITAL | Age: 71
End: 2021-07-19
Payer: MEDICARE

## 2021-07-19 DIAGNOSIS — M79.602 PAIN OF LEFT UPPER EXTREMITY: ICD-10-CM

## 2021-07-19 DIAGNOSIS — M25.60 STIFFNESS DUE TO IMMOBILITY: ICD-10-CM

## 2021-07-19 DIAGNOSIS — R53.1 WEAKNESS: ICD-10-CM

## 2021-07-19 DIAGNOSIS — Z74.09 STIFFNESS DUE TO IMMOBILITY: ICD-10-CM

## 2021-07-19 PROCEDURE — 97110 THERAPEUTIC EXERCISES: CPT | Mod: PN

## 2021-07-19 PROCEDURE — 97140 MANUAL THERAPY 1/> REGIONS: CPT | Mod: PN

## 2021-07-20 ENCOUNTER — OFFICE VISIT (OUTPATIENT)
Dept: OPHTHALMOLOGY | Facility: CLINIC | Age: 71
End: 2021-07-20
Payer: MEDICARE

## 2021-07-20 DIAGNOSIS — H04.123 DRY EYE SYNDROME OF BOTH EYES: ICD-10-CM

## 2021-07-20 DIAGNOSIS — H57.89 THYROID EYE DISEASE: Primary | ICD-10-CM

## 2021-07-20 DIAGNOSIS — E07.9 THYROID EYE DISEASE: Primary | ICD-10-CM

## 2021-07-20 DIAGNOSIS — H02.533 EYELID RETRACTION OF BOTH EYES: ICD-10-CM

## 2021-07-20 DIAGNOSIS — H02.22C MECHANICAL LAGOPHTHALMOS OF BOTH UPPER AND LOWER EYELIDS OF BOTH EYES: ICD-10-CM

## 2021-07-20 DIAGNOSIS — E05.90 HYPERTHYROIDISM: ICD-10-CM

## 2021-07-20 DIAGNOSIS — H16.8 EXPOSURE KERATITIS: ICD-10-CM

## 2021-07-20 DIAGNOSIS — H05.243 CONSTANT EXOPHTHALMOS, BILATERAL: ICD-10-CM

## 2021-07-20 DIAGNOSIS — H02.536 EYELID RETRACTION OF BOTH EYES: ICD-10-CM

## 2021-07-20 PROCEDURE — 99214 OFFICE O/P EST MOD 30 MIN: CPT | Mod: S$PBB,,, | Performed by: OPHTHALMOLOGY

## 2021-07-20 PROCEDURE — 99999 PR PBB SHADOW E&M-EST. PATIENT-LVL III: ICD-10-PCS | Mod: PBBFAC,,, | Performed by: OPHTHALMOLOGY

## 2021-07-20 PROCEDURE — 99999 PR PBB SHADOW E&M-EST. PATIENT-LVL III: CPT | Mod: PBBFAC,,, | Performed by: OPHTHALMOLOGY

## 2021-07-20 PROCEDURE — 99213 OFFICE O/P EST LOW 20 MIN: CPT | Mod: PBBFAC | Performed by: OPHTHALMOLOGY

## 2021-07-20 PROCEDURE — 99214 PR OFFICE/OUTPT VISIT, EST, LEVL IV, 30-39 MIN: ICD-10-PCS | Mod: S$PBB,,, | Performed by: OPHTHALMOLOGY

## 2021-07-21 ENCOUNTER — CLINICAL SUPPORT (OUTPATIENT)
Dept: REHABILITATION | Facility: HOSPITAL | Age: 71
End: 2021-07-21
Payer: MEDICARE

## 2021-07-21 DIAGNOSIS — M25.60 STIFFNESS DUE TO IMMOBILITY: ICD-10-CM

## 2021-07-21 DIAGNOSIS — M79.602 PAIN OF LEFT UPPER EXTREMITY: ICD-10-CM

## 2021-07-21 DIAGNOSIS — R53.1 WEAKNESS: ICD-10-CM

## 2021-07-21 DIAGNOSIS — Z74.09 STIFFNESS DUE TO IMMOBILITY: ICD-10-CM

## 2021-07-21 PROCEDURE — 97110 THERAPEUTIC EXERCISES: CPT | Mod: PN

## 2021-07-21 PROCEDURE — 97140 MANUAL THERAPY 1/> REGIONS: CPT | Mod: PN

## 2021-07-26 ENCOUNTER — CLINICAL SUPPORT (OUTPATIENT)
Dept: OPHTHALMOLOGY | Facility: CLINIC | Age: 71
End: 2021-07-26
Payer: MEDICARE

## 2021-07-26 DIAGNOSIS — H04.123 DRY EYE SYNDROME OF BOTH EYES: Primary | ICD-10-CM

## 2021-07-26 DIAGNOSIS — H57.89 THYROID EYE DISEASE: ICD-10-CM

## 2021-07-26 DIAGNOSIS — E07.9 THYROID EYE DISEASE: ICD-10-CM

## 2021-07-26 RX ORDER — CYCLOSPORINE 0.5 MG/ML
1 EMULSION OPHTHALMIC 2 TIMES DAILY
Qty: 180 VIAL | Refills: 3 | Status: CANCELLED | OUTPATIENT
Start: 2021-07-26 | End: 2022-07-26

## 2021-07-27 ENCOUNTER — HOSPITAL ENCOUNTER (OUTPATIENT)
Dept: RADIOLOGY | Facility: HOSPITAL | Age: 71
Discharge: HOME | End: 2021-07-27
Attending: OPHTHALMOLOGY
Payer: MEDICARE

## 2021-07-27 DIAGNOSIS — E07.9 THYROID EYE DISEASE: ICD-10-CM

## 2021-07-27 DIAGNOSIS — H57.89 THYROID EYE DISEASE: ICD-10-CM

## 2021-07-27 PROCEDURE — 70480 CT ORBIT/EAR/FOSSA W/O DYE: CPT | Mod: 26,,, | Performed by: RADIOLOGY

## 2021-07-27 PROCEDURE — 70480 CT ORBIT/EAR/FOSSA W/O DYE: CPT | Mod: TC

## 2021-07-27 PROCEDURE — 70480 CT ORBITS SELLA POST FOSSA WITHOUT CONT: ICD-10-PCS | Mod: 26,,, | Performed by: RADIOLOGY

## 2021-07-28 ENCOUNTER — CLINICAL SUPPORT (OUTPATIENT)
Dept: REHABILITATION | Facility: HOSPITAL | Age: 71
End: 2021-07-28
Payer: MEDICARE

## 2021-07-28 DIAGNOSIS — R53.1 WEAKNESS: ICD-10-CM

## 2021-07-28 DIAGNOSIS — Z74.09 STIFFNESS DUE TO IMMOBILITY: ICD-10-CM

## 2021-07-28 DIAGNOSIS — M25.60 STIFFNESS DUE TO IMMOBILITY: ICD-10-CM

## 2021-07-28 DIAGNOSIS — M79.602 PAIN OF LEFT UPPER EXTREMITY: ICD-10-CM

## 2021-07-28 PROCEDURE — 97140 MANUAL THERAPY 1/> REGIONS: CPT | Mod: PN

## 2021-07-28 PROCEDURE — 97110 THERAPEUTIC EXERCISES: CPT | Mod: PN

## 2021-08-05 ENCOUNTER — CLINICAL SUPPORT (OUTPATIENT)
Dept: REHABILITATION | Facility: HOSPITAL | Age: 71
End: 2021-08-05
Payer: MEDICARE

## 2021-08-05 DIAGNOSIS — Z74.09 STIFFNESS DUE TO IMMOBILITY: ICD-10-CM

## 2021-08-05 DIAGNOSIS — M79.602 PAIN OF LEFT UPPER EXTREMITY: ICD-10-CM

## 2021-08-05 DIAGNOSIS — M25.60 STIFFNESS DUE TO IMMOBILITY: ICD-10-CM

## 2021-08-05 DIAGNOSIS — R53.1 WEAKNESS: ICD-10-CM

## 2021-08-05 PROCEDURE — 97110 THERAPEUTIC EXERCISES: CPT | Mod: PN

## 2021-08-05 PROCEDURE — 97140 MANUAL THERAPY 1/> REGIONS: CPT | Mod: PN

## 2021-08-08 ENCOUNTER — PATIENT MESSAGE (OUTPATIENT)
Dept: ADMINISTRATIVE | Facility: OTHER | Age: 71
End: 2021-08-08

## 2021-08-09 ENCOUNTER — CLINICAL SUPPORT (OUTPATIENT)
Dept: REHABILITATION | Facility: HOSPITAL | Age: 71
End: 2021-08-09
Payer: MEDICARE

## 2021-08-09 DIAGNOSIS — Z74.09 STIFFNESS DUE TO IMMOBILITY: ICD-10-CM

## 2021-08-09 DIAGNOSIS — R53.1 WEAKNESS: ICD-10-CM

## 2021-08-09 DIAGNOSIS — M79.602 PAIN OF LEFT UPPER EXTREMITY: ICD-10-CM

## 2021-08-09 DIAGNOSIS — M25.60 STIFFNESS DUE TO IMMOBILITY: ICD-10-CM

## 2021-08-09 PROCEDURE — 97110 THERAPEUTIC EXERCISES: CPT | Mod: PN

## 2021-08-09 PROCEDURE — 97140 MANUAL THERAPY 1/> REGIONS: CPT | Mod: PN

## 2021-08-11 ENCOUNTER — CLINICAL SUPPORT (OUTPATIENT)
Dept: REHABILITATION | Facility: HOSPITAL | Age: 71
End: 2021-08-11
Payer: MEDICARE

## 2021-08-11 DIAGNOSIS — M25.60 STIFFNESS DUE TO IMMOBILITY: ICD-10-CM

## 2021-08-11 DIAGNOSIS — R53.1 WEAKNESS: ICD-10-CM

## 2021-08-11 DIAGNOSIS — M79.602 PAIN OF LEFT UPPER EXTREMITY: ICD-10-CM

## 2021-08-11 DIAGNOSIS — Z74.09 STIFFNESS DUE TO IMMOBILITY: ICD-10-CM

## 2021-08-11 PROCEDURE — 97110 THERAPEUTIC EXERCISES: CPT | Mod: PN

## 2021-08-11 PROCEDURE — 97140 MANUAL THERAPY 1/> REGIONS: CPT | Mod: PN

## 2021-08-16 ENCOUNTER — OFFICE VISIT (OUTPATIENT)
Dept: ORTHOPEDICS | Facility: CLINIC | Age: 71
End: 2021-08-16
Payer: MEDICARE

## 2021-08-16 VITALS — BODY MASS INDEX: 28.34 KG/M2 | HEIGHT: 62 IN | WEIGHT: 154 LBS

## 2021-08-16 DIAGNOSIS — M75.122 NONTRAUMATIC COMPLETE TEAR OF LEFT ROTATOR CUFF: Primary | ICD-10-CM

## 2021-08-16 PROCEDURE — 99999 PR PBB SHADOW E&M-EST. PATIENT-LVL III: ICD-10-PCS | Mod: PBBFAC,,, | Performed by: ORTHOPAEDIC SURGERY

## 2021-08-16 PROCEDURE — 99024 POSTOP FOLLOW-UP VISIT: CPT | Mod: POP,,, | Performed by: ORTHOPAEDIC SURGERY

## 2021-08-16 PROCEDURE — 99999 PR PBB SHADOW E&M-EST. PATIENT-LVL III: CPT | Mod: PBBFAC,,, | Performed by: ORTHOPAEDIC SURGERY

## 2021-08-16 PROCEDURE — 99024 PR POST-OP FOLLOW-UP VISIT: ICD-10-PCS | Mod: POP,,, | Performed by: ORTHOPAEDIC SURGERY

## 2021-08-16 PROCEDURE — 99213 OFFICE O/P EST LOW 20 MIN: CPT | Mod: PBBFAC,PN | Performed by: ORTHOPAEDIC SURGERY

## 2021-08-16 RX ORDER — TRAMADOL HYDROCHLORIDE 50 MG/1
50 TABLET ORAL EVERY 6 HOURS PRN
Qty: 30 TABLET | Refills: 0 | Status: SHIPPED | OUTPATIENT
Start: 2021-08-16 | End: 2021-08-26

## 2021-08-17 ENCOUNTER — CLINICAL SUPPORT (OUTPATIENT)
Dept: REHABILITATION | Facility: HOSPITAL | Age: 71
End: 2021-08-17
Payer: MEDICARE

## 2021-08-17 DIAGNOSIS — R53.1 WEAKNESS: ICD-10-CM

## 2021-08-17 DIAGNOSIS — M25.60 STIFFNESS DUE TO IMMOBILITY: ICD-10-CM

## 2021-08-17 DIAGNOSIS — M79.602 PAIN OF LEFT UPPER EXTREMITY: ICD-10-CM

## 2021-08-17 DIAGNOSIS — Z74.09 STIFFNESS DUE TO IMMOBILITY: ICD-10-CM

## 2021-08-17 PROCEDURE — 97140 MANUAL THERAPY 1/> REGIONS: CPT | Mod: PN

## 2021-08-17 PROCEDURE — 97110 THERAPEUTIC EXERCISES: CPT | Mod: PN

## 2021-08-18 ENCOUNTER — TELEPHONE (OUTPATIENT)
Dept: ORTHOPEDICS | Facility: CLINIC | Age: 71
End: 2021-08-18

## 2021-08-19 ENCOUNTER — CLINICAL SUPPORT (OUTPATIENT)
Dept: REHABILITATION | Facility: HOSPITAL | Age: 71
End: 2021-08-19
Payer: MEDICARE

## 2021-08-19 DIAGNOSIS — R53.1 WEAKNESS: ICD-10-CM

## 2021-08-19 DIAGNOSIS — M79.602 PAIN OF LEFT UPPER EXTREMITY: ICD-10-CM

## 2021-08-19 DIAGNOSIS — M25.60 STIFFNESS DUE TO IMMOBILITY: ICD-10-CM

## 2021-08-19 DIAGNOSIS — Z74.09 STIFFNESS DUE TO IMMOBILITY: ICD-10-CM

## 2021-08-19 PROCEDURE — 97110 THERAPEUTIC EXERCISES: CPT | Mod: PN

## 2021-08-19 PROCEDURE — 97140 MANUAL THERAPY 1/> REGIONS: CPT | Mod: PN

## 2021-08-23 ENCOUNTER — CLINICAL SUPPORT (OUTPATIENT)
Dept: REHABILITATION | Facility: HOSPITAL | Age: 71
End: 2021-08-23
Payer: MEDICARE

## 2021-08-23 DIAGNOSIS — M25.60 STIFFNESS DUE TO IMMOBILITY: ICD-10-CM

## 2021-08-23 DIAGNOSIS — Z74.09 STIFFNESS DUE TO IMMOBILITY: ICD-10-CM

## 2021-08-23 DIAGNOSIS — M79.602 PAIN OF LEFT UPPER EXTREMITY: ICD-10-CM

## 2021-08-23 DIAGNOSIS — R53.1 WEAKNESS: ICD-10-CM

## 2021-08-23 PROCEDURE — 97110 THERAPEUTIC EXERCISES: CPT | Mod: PN

## 2021-08-23 PROCEDURE — 97140 MANUAL THERAPY 1/> REGIONS: CPT | Mod: PN

## 2021-08-24 ENCOUNTER — PATIENT OUTREACH (OUTPATIENT)
Dept: ADMINISTRATIVE | Facility: OTHER | Age: 71
End: 2021-08-24

## 2021-08-25 ENCOUNTER — CLINICAL SUPPORT (OUTPATIENT)
Dept: REHABILITATION | Facility: HOSPITAL | Age: 71
End: 2021-08-25
Payer: MEDICARE

## 2021-08-25 DIAGNOSIS — Z74.09 STIFFNESS DUE TO IMMOBILITY: ICD-10-CM

## 2021-08-25 DIAGNOSIS — R53.1 WEAKNESS: ICD-10-CM

## 2021-08-25 DIAGNOSIS — M79.602 PAIN OF LEFT UPPER EXTREMITY: ICD-10-CM

## 2021-08-25 DIAGNOSIS — M25.60 STIFFNESS DUE TO IMMOBILITY: ICD-10-CM

## 2021-08-25 PROCEDURE — 97140 MANUAL THERAPY 1/> REGIONS: CPT | Mod: PN

## 2021-08-25 PROCEDURE — 97110 THERAPEUTIC EXERCISES: CPT | Mod: PN

## 2021-08-31 PROCEDURE — 99457 PR MONITORING, PHYSIOL PARAM, REMOTE, 1ST 20 MINS, PER MONTH: ICD-10-PCS | Mod: S$PBB,,, | Performed by: INTERNAL MEDICINE

## 2021-08-31 PROCEDURE — 99457 RPM TX MGMT 1ST 20 MIN: CPT | Mod: S$PBB,,, | Performed by: INTERNAL MEDICINE

## 2021-09-13 ENCOUNTER — CLINICAL SUPPORT (OUTPATIENT)
Dept: REHABILITATION | Facility: HOSPITAL | Age: 71
End: 2021-09-13
Payer: MEDICARE

## 2021-09-13 DIAGNOSIS — M79.602 PAIN OF LEFT UPPER EXTREMITY: ICD-10-CM

## 2021-09-13 DIAGNOSIS — Z74.09 STIFFNESS DUE TO IMMOBILITY: ICD-10-CM

## 2021-09-13 DIAGNOSIS — M25.60 STIFFNESS DUE TO IMMOBILITY: ICD-10-CM

## 2021-09-13 DIAGNOSIS — R53.1 WEAKNESS: ICD-10-CM

## 2021-09-13 PROCEDURE — 97140 MANUAL THERAPY 1/> REGIONS: CPT | Mod: PN

## 2021-09-13 PROCEDURE — 97110 THERAPEUTIC EXERCISES: CPT | Mod: PN

## 2021-09-16 DIAGNOSIS — I10 ESSENTIAL HYPERTENSION: ICD-10-CM

## 2021-09-16 RX ORDER — AMLODIPINE AND BENAZEPRIL HYDROCHLORIDE 10; 20 MG/1; MG/1
1 CAPSULE ORAL DAILY
Qty: 90 CAPSULE | Refills: 3 | Status: SHIPPED | OUTPATIENT
Start: 2021-09-16 | End: 2021-12-20 | Stop reason: SDUPTHER

## 2021-09-20 ENCOUNTER — PATIENT OUTREACH (OUTPATIENT)
Dept: ADMINISTRATIVE | Facility: HOSPITAL | Age: 71
End: 2021-09-20

## 2021-09-20 ENCOUNTER — CLINICAL SUPPORT (OUTPATIENT)
Dept: REHABILITATION | Facility: HOSPITAL | Age: 71
End: 2021-09-20
Payer: MEDICARE

## 2021-09-20 DIAGNOSIS — M25.60 STIFFNESS DUE TO IMMOBILITY: ICD-10-CM

## 2021-09-20 DIAGNOSIS — M79.602 PAIN OF LEFT UPPER EXTREMITY: ICD-10-CM

## 2021-09-20 DIAGNOSIS — R53.1 WEAKNESS: ICD-10-CM

## 2021-09-20 DIAGNOSIS — Z74.09 STIFFNESS DUE TO IMMOBILITY: ICD-10-CM

## 2021-09-20 PROCEDURE — 97140 MANUAL THERAPY 1/> REGIONS: CPT | Mod: PN

## 2021-09-20 PROCEDURE — 97110 THERAPEUTIC EXERCISES: CPT | Mod: PN

## 2021-09-21 ENCOUNTER — PATIENT OUTREACH (OUTPATIENT)
Dept: ADMINISTRATIVE | Facility: HOSPITAL | Age: 71
End: 2021-09-21

## 2021-09-22 ENCOUNTER — CLINICAL SUPPORT (OUTPATIENT)
Dept: REHABILITATION | Facility: HOSPITAL | Age: 71
End: 2021-09-22
Payer: MEDICARE

## 2021-09-22 DIAGNOSIS — M25.60 STIFFNESS DUE TO IMMOBILITY: ICD-10-CM

## 2021-09-22 DIAGNOSIS — R53.1 WEAKNESS: ICD-10-CM

## 2021-09-22 DIAGNOSIS — M79.602 PAIN OF LEFT UPPER EXTREMITY: ICD-10-CM

## 2021-09-22 DIAGNOSIS — Z74.09 STIFFNESS DUE TO IMMOBILITY: ICD-10-CM

## 2021-09-22 PROCEDURE — 97140 MANUAL THERAPY 1/> REGIONS: CPT | Mod: PN

## 2021-09-22 PROCEDURE — 97110 THERAPEUTIC EXERCISES: CPT | Mod: PN

## 2021-09-27 ENCOUNTER — CLINICAL SUPPORT (OUTPATIENT)
Dept: REHABILITATION | Facility: HOSPITAL | Age: 71
End: 2021-09-27
Payer: MEDICARE

## 2021-09-27 ENCOUNTER — OFFICE VISIT (OUTPATIENT)
Dept: ORTHOPEDICS | Facility: CLINIC | Age: 71
End: 2021-09-27
Payer: MEDICARE

## 2021-09-27 VITALS — HEIGHT: 62 IN | WEIGHT: 154 LBS | BODY MASS INDEX: 28.34 KG/M2

## 2021-09-27 DIAGNOSIS — M75.122 NONTRAUMATIC COMPLETE TEAR OF LEFT ROTATOR CUFF: Primary | ICD-10-CM

## 2021-09-27 DIAGNOSIS — M79.602 PAIN OF LEFT UPPER EXTREMITY: ICD-10-CM

## 2021-09-27 DIAGNOSIS — Z74.09 STIFFNESS DUE TO IMMOBILITY: ICD-10-CM

## 2021-09-27 DIAGNOSIS — M25.60 STIFFNESS DUE TO IMMOBILITY: ICD-10-CM

## 2021-09-27 DIAGNOSIS — R53.1 WEAKNESS: ICD-10-CM

## 2021-09-27 PROCEDURE — 99213 OFFICE O/P EST LOW 20 MIN: CPT | Mod: S$PBB,,, | Performed by: ORTHOPAEDIC SURGERY

## 2021-09-27 PROCEDURE — 99999 PR PBB SHADOW E&M-EST. PATIENT-LVL III: ICD-10-PCS | Mod: PBBFAC,,, | Performed by: ORTHOPAEDIC SURGERY

## 2021-09-27 PROCEDURE — 97110 THERAPEUTIC EXERCISES: CPT | Mod: PN

## 2021-09-27 PROCEDURE — 99999 PR PBB SHADOW E&M-EST. PATIENT-LVL III: CPT | Mod: PBBFAC,,, | Performed by: ORTHOPAEDIC SURGERY

## 2021-09-27 PROCEDURE — 99213 OFFICE O/P EST LOW 20 MIN: CPT | Mod: PBBFAC,PN | Performed by: ORTHOPAEDIC SURGERY

## 2021-09-27 PROCEDURE — 97140 MANUAL THERAPY 1/> REGIONS: CPT | Mod: PN

## 2021-09-27 PROCEDURE — 99213 PR OFFICE/OUTPT VISIT, EST, LEVL III, 20-29 MIN: ICD-10-PCS | Mod: S$PBB,,, | Performed by: ORTHOPAEDIC SURGERY

## 2021-09-29 ENCOUNTER — CLINICAL SUPPORT (OUTPATIENT)
Dept: REHABILITATION | Facility: HOSPITAL | Age: 71
End: 2021-09-29
Payer: MEDICARE

## 2021-09-29 DIAGNOSIS — R53.1 WEAKNESS: ICD-10-CM

## 2021-09-29 DIAGNOSIS — M79.602 PAIN OF LEFT UPPER EXTREMITY: ICD-10-CM

## 2021-09-29 DIAGNOSIS — M25.60 STIFFNESS DUE TO IMMOBILITY: ICD-10-CM

## 2021-09-29 DIAGNOSIS — Z74.09 STIFFNESS DUE TO IMMOBILITY: ICD-10-CM

## 2021-09-29 PROCEDURE — 97140 MANUAL THERAPY 1/> REGIONS: CPT | Mod: PN

## 2021-09-29 PROCEDURE — 97110 THERAPEUTIC EXERCISES: CPT | Mod: PN

## 2021-10-06 ENCOUNTER — TELEPHONE (OUTPATIENT)
Dept: ORTHOPEDICS | Facility: CLINIC | Age: 71
End: 2021-10-06

## 2021-10-13 ENCOUNTER — OFFICE VISIT (OUTPATIENT)
Dept: FAMILY MEDICINE | Facility: CLINIC | Age: 71
End: 2021-10-13
Payer: MEDICARE

## 2021-10-13 VITALS
HEIGHT: 62 IN | HEART RATE: 74 BPM | DIASTOLIC BLOOD PRESSURE: 68 MMHG | WEIGHT: 154.56 LBS | OXYGEN SATURATION: 98 % | SYSTOLIC BLOOD PRESSURE: 118 MMHG | BODY MASS INDEX: 28.44 KG/M2

## 2021-10-13 DIAGNOSIS — E07.9 THYROID EYE DISEASE: Primary | ICD-10-CM

## 2021-10-13 DIAGNOSIS — I10 ESSENTIAL HYPERTENSION: ICD-10-CM

## 2021-10-13 DIAGNOSIS — I48.0 PAROXYSMAL A-FIB: ICD-10-CM

## 2021-10-13 DIAGNOSIS — D64.9 NORMOCYTIC ANEMIA: ICD-10-CM

## 2021-10-13 DIAGNOSIS — R35.89 POLYURIA: ICD-10-CM

## 2021-10-13 DIAGNOSIS — H57.89 THYROID EYE DISEASE: Primary | ICD-10-CM

## 2021-10-13 DIAGNOSIS — H04.123 DRY EYE SYNDROME OF BOTH EYES: ICD-10-CM

## 2021-10-13 DIAGNOSIS — R42 DIZZINESS: ICD-10-CM

## 2021-10-13 PROCEDURE — 99214 OFFICE O/P EST MOD 30 MIN: CPT | Mod: S$PBB,,, | Performed by: INTERNAL MEDICINE

## 2021-10-13 PROCEDURE — 99999 PR PBB SHADOW E&M-EST. PATIENT-LVL IV: CPT | Mod: PBBFAC,,, | Performed by: INTERNAL MEDICINE

## 2021-10-13 PROCEDURE — 99214 PR OFFICE/OUTPT VISIT, EST, LEVL IV, 30-39 MIN: ICD-10-PCS | Mod: S$PBB,,, | Performed by: INTERNAL MEDICINE

## 2021-10-13 PROCEDURE — 99999 PR PBB SHADOW E&M-EST. PATIENT-LVL IV: ICD-10-PCS | Mod: PBBFAC,,, | Performed by: INTERNAL MEDICINE

## 2021-10-13 PROCEDURE — 99214 OFFICE O/P EST MOD 30 MIN: CPT | Mod: PBBFAC,PO | Performed by: INTERNAL MEDICINE

## 2021-10-13 RX ORDER — OXYBUTYNIN CHLORIDE 5 MG/1
10 TABLET, EXTENDED RELEASE ORAL DAILY
Qty: 180 TABLET | Refills: 3 | Status: SHIPPED | OUTPATIENT
Start: 2021-10-13 | End: 2022-10-31

## 2021-10-13 RX ORDER — PROPYLENE GLYCOL 0.06 MG/ML
2 EMULSION OPHTHALMIC 2 TIMES DAILY
Qty: 3 BOTTLE | Refills: 3 | Status: SHIPPED | OUTPATIENT
Start: 2021-10-13

## 2021-10-13 RX ORDER — OXYBUTYNIN CHLORIDE 5 MG/1
10 TABLET, EXTENDED RELEASE ORAL DAILY
Qty: 60 TABLET | Refills: 11 | Status: SHIPPED | OUTPATIENT
Start: 2021-10-13 | End: 2021-10-13

## 2021-10-13 RX ORDER — PROPYLENE GLYCOL 0.06 MG/ML
2 EMULSION OPHTHALMIC 2 TIMES DAILY
Qty: 1 BOTTLE | Refills: 3 | Status: SHIPPED | OUTPATIENT
Start: 2021-10-13 | End: 2021-10-13

## 2021-10-15 ENCOUNTER — LAB VISIT (OUTPATIENT)
Dept: LAB | Facility: HOSPITAL | Age: 71
End: 2021-10-15
Attending: INTERNAL MEDICINE
Payer: MEDICARE

## 2021-10-15 ENCOUNTER — TELEPHONE (OUTPATIENT)
Dept: FAMILY MEDICINE | Facility: CLINIC | Age: 71
End: 2021-10-15

## 2021-10-15 DIAGNOSIS — M79.646 PAIN OF FINGER, UNSPECIFIED LATERALITY: Primary | ICD-10-CM

## 2021-10-15 DIAGNOSIS — R35.89 POLYURIA: ICD-10-CM

## 2021-10-15 LAB
BILIRUB UR QL STRIP: NEGATIVE
CLARITY UR: CLEAR
COLOR UR: COLORLESS
GLUCOSE UR QL STRIP: NEGATIVE
HGB UR QL STRIP: NEGATIVE
KETONES UR QL STRIP: NEGATIVE
LEUKOCYTE ESTERASE UR QL STRIP: NEGATIVE
NITRITE UR QL STRIP: NEGATIVE
PH UR STRIP: 7 [PH] (ref 5–8)
PROT UR QL STRIP: NEGATIVE
SP GR UR STRIP: 1.01 (ref 1–1.03)
URN SPEC COLLECT METH UR: ABNORMAL
UROBILINOGEN UR STRIP-ACNC: NEGATIVE EU/DL

## 2021-10-15 PROCEDURE — 81003 URINALYSIS AUTO W/O SCOPE: CPT | Performed by: INTERNAL MEDICINE

## 2021-10-18 ENCOUNTER — PATIENT MESSAGE (OUTPATIENT)
Dept: FAMILY MEDICINE | Facility: CLINIC | Age: 71
End: 2021-10-18

## 2021-10-18 ENCOUNTER — TELEPHONE (OUTPATIENT)
Dept: FAMILY MEDICINE | Facility: CLINIC | Age: 71
End: 2021-10-18

## 2021-10-19 ENCOUNTER — TELEPHONE (OUTPATIENT)
Dept: FAMILY MEDICINE | Facility: CLINIC | Age: 71
End: 2021-10-19

## 2021-10-22 ENCOUNTER — OFFICE VISIT (OUTPATIENT)
Dept: ORTHOPEDICS | Facility: CLINIC | Age: 71
End: 2021-10-22
Payer: MEDICARE

## 2021-10-22 VITALS — HEIGHT: 62 IN | BODY MASS INDEX: 28.34 KG/M2 | WEIGHT: 154 LBS

## 2021-10-22 DIAGNOSIS — Z98.890 STATUS POST ROTATOR CUFF REPAIR: Primary | ICD-10-CM

## 2021-10-22 PROCEDURE — 99213 OFFICE O/P EST LOW 20 MIN: CPT | Mod: S$PBB,,, | Performed by: ORTHOPAEDIC SURGERY

## 2021-10-22 PROCEDURE — 99999 PR PBB SHADOW E&M-EST. PATIENT-LVL III: CPT | Mod: PBBFAC,,, | Performed by: ORTHOPAEDIC SURGERY

## 2021-10-22 PROCEDURE — 99213 PR OFFICE/OUTPT VISIT, EST, LEVL III, 20-29 MIN: ICD-10-PCS | Mod: S$PBB,,, | Performed by: ORTHOPAEDIC SURGERY

## 2021-10-22 PROCEDURE — 99999 PR PBB SHADOW E&M-EST. PATIENT-LVL III: ICD-10-PCS | Mod: PBBFAC,,, | Performed by: ORTHOPAEDIC SURGERY

## 2021-10-22 PROCEDURE — 99213 OFFICE O/P EST LOW 20 MIN: CPT | Mod: PBBFAC,PN | Performed by: ORTHOPAEDIC SURGERY

## 2021-10-23 ENCOUNTER — NURSE TRIAGE (OUTPATIENT)
Dept: ADMINISTRATIVE | Facility: CLINIC | Age: 71
End: 2021-10-23
Payer: MEDICARE

## 2021-11-02 ENCOUNTER — TELEPHONE (OUTPATIENT)
Dept: FAMILY MEDICINE | Facility: CLINIC | Age: 71
End: 2021-11-02
Payer: MEDICARE

## 2021-11-02 DIAGNOSIS — M79.646 PAIN OF FINGER, UNSPECIFIED LATERALITY: Primary | ICD-10-CM

## 2021-11-03 ENCOUNTER — PATIENT MESSAGE (OUTPATIENT)
Dept: FAMILY MEDICINE | Facility: CLINIC | Age: 71
End: 2021-11-03
Payer: MEDICARE

## 2021-11-04 ENCOUNTER — OFFICE VISIT (OUTPATIENT)
Dept: OPHTHALMOLOGY | Facility: CLINIC | Age: 71
End: 2021-11-04
Payer: MEDICARE

## 2021-11-04 ENCOUNTER — TELEPHONE (OUTPATIENT)
Dept: OPTOMETRY | Facility: CLINIC | Age: 71
End: 2021-11-04
Payer: MEDICARE

## 2021-11-04 DIAGNOSIS — H02.22C MECHANICAL LAGOPHTHALMOS OF BOTH UPPER AND LOWER EYELIDS OF BOTH EYES: ICD-10-CM

## 2021-11-04 DIAGNOSIS — H04.123 DRY EYE SYNDROME OF BOTH EYES: Primary | ICD-10-CM

## 2021-11-04 DIAGNOSIS — H02.533 EYELID RETRACTION OF BOTH EYES: ICD-10-CM

## 2021-11-04 DIAGNOSIS — H02.536 EYELID RETRACTION OF BOTH EYES: ICD-10-CM

## 2021-11-04 PROCEDURE — 99214 PR OFFICE/OUTPT VISIT, EST, LEVL IV, 30-39 MIN: ICD-10-PCS | Mod: S$PBB,,, | Performed by: OPHTHALMOLOGY

## 2021-11-04 PROCEDURE — 99214 OFFICE O/P EST MOD 30 MIN: CPT | Mod: S$PBB,,, | Performed by: OPHTHALMOLOGY

## 2021-11-04 PROCEDURE — 99999 PR PBB SHADOW E&M-EST. PATIENT-LVL II: ICD-10-PCS | Mod: PBBFAC,,, | Performed by: OPHTHALMOLOGY

## 2021-11-04 PROCEDURE — 99212 OFFICE O/P EST SF 10 MIN: CPT | Mod: PBBFAC | Performed by: OPHTHALMOLOGY

## 2021-11-04 PROCEDURE — 99999 PR PBB SHADOW E&M-EST. PATIENT-LVL II: CPT | Mod: PBBFAC,,, | Performed by: OPHTHALMOLOGY

## 2021-12-07 ENCOUNTER — OFFICE VISIT (OUTPATIENT)
Dept: OTOLARYNGOLOGY | Facility: CLINIC | Age: 71
End: 2021-12-07
Payer: MEDICARE

## 2021-12-07 ENCOUNTER — CLINICAL SUPPORT (OUTPATIENT)
Dept: OTOLARYNGOLOGY | Facility: CLINIC | Age: 71
End: 2021-12-07
Payer: MEDICARE

## 2021-12-07 VITALS
HEART RATE: 69 BPM | WEIGHT: 145.94 LBS | BODY MASS INDEX: 26.86 KG/M2 | SYSTOLIC BLOOD PRESSURE: 147 MMHG | HEIGHT: 62 IN | DIASTOLIC BLOOD PRESSURE: 76 MMHG

## 2021-12-07 DIAGNOSIS — H90.3 SENSORINEURAL HEARING LOSS (SNHL) OF BOTH EARS: Chronic | ICD-10-CM

## 2021-12-07 DIAGNOSIS — R26.89 IMBALANCE: Primary | Chronic | ICD-10-CM

## 2021-12-07 DIAGNOSIS — R42 DIZZINESS AND GIDDINESS: Primary | ICD-10-CM

## 2021-12-07 DIAGNOSIS — H90.3 SENSORINEURAL HEARING LOSS, BILATERAL: ICD-10-CM

## 2021-12-07 DIAGNOSIS — R42 DIZZINESS: ICD-10-CM

## 2021-12-07 PROCEDURE — 99214 OFFICE O/P EST MOD 30 MIN: CPT | Mod: PBBFAC,PN | Performed by: OTOLARYNGOLOGY

## 2021-12-07 PROCEDURE — 99204 PR OFFICE/OUTPT VISIT, NEW, LEVL IV, 45-59 MIN: ICD-10-PCS | Mod: S$PBB,,, | Performed by: OTOLARYNGOLOGY

## 2021-12-07 PROCEDURE — 99999 PR PBB SHADOW E&M-EST. PATIENT-LVL IV: CPT | Mod: PBBFAC,,, | Performed by: OTOLARYNGOLOGY

## 2021-12-07 PROCEDURE — 92557 COMPREHENSIVE HEARING TEST: CPT | Mod: PBBFAC,PN | Performed by: AUDIOLOGIST

## 2021-12-07 PROCEDURE — 99204 OFFICE O/P NEW MOD 45 MIN: CPT | Mod: S$PBB,,, | Performed by: OTOLARYNGOLOGY

## 2021-12-07 PROCEDURE — 99999 PR PBB SHADOW E&M-EST. PATIENT-LVL IV: ICD-10-PCS | Mod: PBBFAC,,, | Performed by: OTOLARYNGOLOGY

## 2021-12-07 PROCEDURE — 92567 TYMPANOMETRY: CPT | Mod: PBBFAC,PN | Performed by: AUDIOLOGIST

## 2021-12-20 ENCOUNTER — OFFICE VISIT (OUTPATIENT)
Dept: FAMILY MEDICINE | Facility: CLINIC | Age: 71
End: 2021-12-20
Payer: MEDICARE

## 2021-12-20 VITALS
HEART RATE: 60 BPM | DIASTOLIC BLOOD PRESSURE: 73 MMHG | TEMPERATURE: 98 F | BODY MASS INDEX: 28.39 KG/M2 | HEIGHT: 62 IN | WEIGHT: 154.31 LBS | SYSTOLIC BLOOD PRESSURE: 138 MMHG | OXYGEN SATURATION: 99 %

## 2021-12-20 DIAGNOSIS — H04.123 DRY EYE SYNDROME OF BOTH EYES: ICD-10-CM

## 2021-12-20 DIAGNOSIS — I10 ESSENTIAL HYPERTENSION: Primary | ICD-10-CM

## 2021-12-20 DIAGNOSIS — Z79.899 MEDICATION MANAGEMENT: ICD-10-CM

## 2021-12-20 DIAGNOSIS — I48.0 PAROXYSMAL A-FIB: ICD-10-CM

## 2021-12-20 DIAGNOSIS — I35.1 NONRHEUMATIC AORTIC VALVE INSUFFICIENCY: ICD-10-CM

## 2021-12-20 DIAGNOSIS — H57.89 THYROID EYE DISEASE: ICD-10-CM

## 2021-12-20 DIAGNOSIS — E07.9 THYROID EYE DISEASE: ICD-10-CM

## 2021-12-20 PROCEDURE — 99999 PR PBB SHADOW E&M-EST. PATIENT-LVL IV: ICD-10-PCS | Mod: PBBFAC,,, | Performed by: INTERNAL MEDICINE

## 2021-12-20 PROCEDURE — 99214 OFFICE O/P EST MOD 30 MIN: CPT | Mod: S$PBB,,, | Performed by: INTERNAL MEDICINE

## 2021-12-20 PROCEDURE — 99214 OFFICE O/P EST MOD 30 MIN: CPT | Mod: PBBFAC,PO | Performed by: INTERNAL MEDICINE

## 2021-12-20 PROCEDURE — 99999 PR PBB SHADOW E&M-EST. PATIENT-LVL IV: CPT | Mod: PBBFAC,,, | Performed by: INTERNAL MEDICINE

## 2021-12-20 PROCEDURE — 99214 PR OFFICE/OUTPT VISIT, EST, LEVL IV, 30-39 MIN: ICD-10-PCS | Mod: S$PBB,,, | Performed by: INTERNAL MEDICINE

## 2021-12-20 RX ORDER — AMLODIPINE AND BENAZEPRIL HYDROCHLORIDE 10; 20 MG/1; MG/1
1 CAPSULE ORAL DAILY
Qty: 90 CAPSULE | Refills: 3 | Status: SHIPPED | OUTPATIENT
Start: 2021-12-20 | End: 2022-01-13 | Stop reason: SDUPTHER

## 2021-12-20 RX ORDER — METOPROLOL SUCCINATE 25 MG/1
12.5 TABLET, EXTENDED RELEASE ORAL DAILY
Qty: 15 TABLET | Refills: 11 | Status: SHIPPED | OUTPATIENT
Start: 2021-12-20 | End: 2021-12-20

## 2021-12-20 RX ORDER — METOPROLOL SUCCINATE 25 MG/1
25 TABLET, EXTENDED RELEASE ORAL NIGHTLY
Qty: 30 TABLET | Refills: 11 | Status: SHIPPED | OUTPATIENT
Start: 2021-12-20 | End: 2021-12-20

## 2021-12-20 RX ORDER — CYCLOSPORINE 0.5 MG/ML
1 EMULSION OPHTHALMIC 2 TIMES DAILY
COMMUNITY

## 2021-12-29 NOTE — PROGRESS NOTES
HPI     Concerns About Ocular Health      Additional comments: Luna'              Comments     Referred by Dr.Jeffrey CHACHA Forde  Dx w/Luna x year.  Patient states OU vision stable. Tears a lot.  Glare problems w/driving at night.  No eye pain.  Review HVF    Using OTC blink prn OU    I have personally interviewed the patient, reviewed the history and   examined the patient and agree with the technician's exam.          Last edited by Aroldo Ghosh MD on 12/9/2020  2:24 PM. (History)            Assessment /Plan     For exam results, see Encounter Report.    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 Erwin is developing ectropion of her lower eyelids due to the DAMI. This is resulting in a tear film insufficiency and epiphora of both eyes.  I recommended artificial tears four times daily, a lubricating ointment at bedtime, and selenium 200 microgram daily. I will notify Dr. Mathews that she may be a candidate for cataract surgery. Repeat exam in three months.                 
Patient nontoxic appearing, stable vitals, ambulatory with stable saturation without supplemental oxygen. Patient advised about self-quarantine instructions until negative test results and/or symptom resolution. Patient advised on hand hygiene, monitoring of symptoms, antipyretic use as well as follow up with primary care provider. Advised verbally and on pre-printed instructions to return immediately for symptoms including but not limited to severe chest pain, shortness of breath, fever not reduced with OTC antipyretic use, inability to tolerate food or liquid. Instructions provided in pre-printed copy.

## 2022-01-04 DIAGNOSIS — Z98.890 STATUS POST ROTATOR CUFF REPAIR: ICD-10-CM

## 2022-01-04 RX ORDER — OXYCODONE AND ACETAMINOPHEN 7.5; 325 MG/1; MG/1
1 TABLET ORAL EVERY 8 HOURS PRN
Qty: 40 TABLET | Refills: 0 | OUTPATIENT
Start: 2022-01-04

## 2022-01-04 NOTE — TELEPHONE ENCOUNTER
----- Message from Jovana Patterson sent at 1/3/2022  4:22 PM CST -----  Regarding: Refill (would like like higher quantity )  Type:  RX Refill Request    Who Called: pt    Refill or New Rx: refill    RX Name and Strength: oxyCODONE-acetaminophen (PERCOCET) 7.5-325 mg per tablet    Preferred Pharmacy with phone number: [email or web address] - [address] [address]  Phone: [phone]    Would the patient rather a call back or a response via [facility]? Call     Best Call Back Number: [phone]

## 2022-01-04 NOTE — TELEPHONE ENCOUNTER
No new care gaps identified.  Powered by [email or web address]. Reference number: [id number].   1/04/2022 10:15:46 AM CST

## 2022-01-13 DIAGNOSIS — I10 ESSENTIAL HYPERTENSION: ICD-10-CM

## 2022-01-13 RX ORDER — AMLODIPINE AND BENAZEPRIL HYDROCHLORIDE 10; 20 MG/1; MG/1
1 CAPSULE ORAL DAILY
Qty: 90 CAPSULE | Refills: 3 | Status: SHIPPED | OUTPATIENT
Start: 2022-01-13 | End: 2022-10-05 | Stop reason: DRUGHIGH

## 2022-01-13 NOTE — TELEPHONE ENCOUNTER
No new care gaps identified.  Powered by [employer] by [employer]. Reference number: [id number].   1/13/2022 2:56:11 PM CST

## 2022-02-23 DIAGNOSIS — D84.9 IMMUNOSUPPRESSED STATUS: ICD-10-CM

## 2022-03-11 ENCOUNTER — PES CALL (OUTPATIENT)
Dept: ADMINISTRATIVE | Facility: CLINIC | Age: 72
End: 2022-03-11
Payer: MEDICARE

## 2022-03-14 ENCOUNTER — TELEPHONE (OUTPATIENT)
Dept: FAMILY MEDICINE | Facility: CLINIC | Age: 72
End: 2022-03-14
Payer: MEDICARE

## 2022-03-14 NOTE — TELEPHONE ENCOUNTER
----- Message from Michael Ma sent at 3/14/2022  1:20 PM CDT -----  Type:  Sooner Apoointment Request    Caller is requesting a sooner appointment.  Caller declined first available appointment listed below.  Caller will not accept being placed on the waitlist and is requesting a message be sent to doctor.  Name of Caller:Self  When is the first available appointment?4/2022  Symptoms:itching all over   Would the patient rather a call back or a response via [facility]? call  Best Call Back [phone]  Additional Information: none

## 2022-04-19 ENCOUNTER — TELEPHONE (OUTPATIENT)
Dept: FAMILY MEDICINE | Facility: CLINIC | Age: 72
End: 2022-04-19
Payer: MEDICARE

## 2022-04-19 ENCOUNTER — PATIENT MESSAGE (OUTPATIENT)
Dept: FAMILY MEDICINE | Facility: CLINIC | Age: 72
End: 2022-04-19
Payer: MEDICARE

## 2022-04-19 DIAGNOSIS — E07.9 THYROID EYE DISEASE: Primary | ICD-10-CM

## 2022-04-19 DIAGNOSIS — H04.123 DRY EYE SYNDROME OF BOTH EYES: ICD-10-CM

## 2022-04-19 DIAGNOSIS — H57.89 THYROID EYE DISEASE: Primary | ICD-10-CM

## 2022-04-19 NOTE — TELEPHONE ENCOUNTER
Patient is requesting a referral to an eye doctor.  Would like referral sent to Dr Brandi Meng.  Phone number [phone]

## 2022-04-19 NOTE — TELEPHONE ENCOUNTER
----- Message from Mariela Gee sent at 4/19/2022 10:27 AM CDT -----  Type:  Patient Requesting Referral    Who Called:pt  Does the patient already have the specialty appointment scheduled?:yes  If yes, what is the date of that appointment?:09/29/22@1pm  Referral to What Specialty:Eye   Reason for Referral:grave disease  Does the patient want the referral with a specific physician?:Dr Brandi Meng  Is the specialist an [facility]?:n/a  Patient Requesting a Response?:yes  Would the patient rather a call back or a response via [facility]? call  Best Call Back Number:[phone]  Additional Information: please call for fax [phone]

## 2022-06-20 ENCOUNTER — OFFICE VISIT (OUTPATIENT)
Dept: FAMILY MEDICINE | Facility: CLINIC | Age: 72
End: 2022-06-20
Payer: MEDICARE

## 2022-06-20 VITALS
WEIGHT: 159.19 LBS | TEMPERATURE: 98 F | OXYGEN SATURATION: 97 % | SYSTOLIC BLOOD PRESSURE: 134 MMHG | DIASTOLIC BLOOD PRESSURE: 86 MMHG | BODY MASS INDEX: 29.3 KG/M2 | HEART RATE: 64 BPM | HEIGHT: 62 IN

## 2022-06-20 DIAGNOSIS — I10 ESSENTIAL HYPERTENSION: Primary | ICD-10-CM

## 2022-06-20 DIAGNOSIS — F03.90 DEMENTIA WITHOUT BEHAVIORAL DISTURBANCE, UNSPECIFIED DEMENTIA TYPE: ICD-10-CM

## 2022-06-20 DIAGNOSIS — E07.9 THYROID EYE DISEASE: ICD-10-CM

## 2022-06-20 DIAGNOSIS — Z12.11 COLON CANCER SCREENING: ICD-10-CM

## 2022-06-20 DIAGNOSIS — Z12.31 ENCOUNTER FOR SCREENING MAMMOGRAM FOR MALIGNANT NEOPLASM OF BREAST: ICD-10-CM

## 2022-06-20 DIAGNOSIS — H57.89 THYROID EYE DISEASE: ICD-10-CM

## 2022-06-20 DIAGNOSIS — I48.0 PAROXYSMAL A-FIB: ICD-10-CM

## 2022-06-20 DIAGNOSIS — D64.9 NORMOCYTIC ANEMIA: ICD-10-CM

## 2022-06-20 PROCEDURE — 99214 OFFICE O/P EST MOD 30 MIN: CPT | Mod: S$PBB,,, | Performed by: INTERNAL MEDICINE

## 2022-06-20 PROCEDURE — 99999 PR PBB SHADOW E&M-EST. PATIENT-LVL IV: ICD-10-PCS | Mod: PBBFAC,,, | Performed by: INTERNAL MEDICINE

## 2022-06-20 PROCEDURE — 99214 PR OFFICE/OUTPT VISIT, EST, LEVL IV, 30-39 MIN: ICD-10-PCS | Mod: S$PBB,,, | Performed by: INTERNAL MEDICINE

## 2022-06-20 PROCEDURE — 99214 OFFICE O/P EST MOD 30 MIN: CPT | Mod: PBBFAC,PO | Performed by: INTERNAL MEDICINE

## 2022-06-20 PROCEDURE — 99999 PR PBB SHADOW E&M-EST. PATIENT-LVL IV: CPT | Mod: PBBFAC,,, | Performed by: INTERNAL MEDICINE

## 2022-06-20 RX ORDER — HYDROCHLOROTHIAZIDE 12.5 MG/1
12.5 TABLET ORAL DAILY
Qty: 90 TABLET | Refills: 3 | Status: SHIPPED | OUTPATIENT
Start: 2022-06-20 | End: 2022-09-06

## 2022-06-20 NOTE — PROGRESS NOTES
Subjective:       Patient ID: Flor Erwin is a 72 y.o. female.    Chief Complaint: Hypertension (6 month )      HPI  Flor Erwin is a 72 y.o. female with chronic conditions of  atrial fibrillation, HTN, thyroid disease, and dry eyes  who presents today for routine health maintenance.    Complains of eye discomfort. Ophthalmology evaluation offerred surgical management to remove fat pad from eyelids but she is not interested in surgery and is hoping that a medical treatment can be given. Reports ongoing lacrimation and irritation of the eye. She is able to close them.  She is followed by Endocrinology outside [facility].  Had recent labs but not available.    Reports blood pressure has usually been stable with a systolic in the 130s and diastolic in the 80s.  Watches her diet and drinks fluids but up to 8 cm to avoid nocturia.  Stays active.    Health Maintenance:  Health Maintenance   Topic Date Due    Hepatitis C Screening  Never done    Mammogram  07/16/2022    DEXA Scan  04/22/2024    Lipid Panel  01/08/2025    TETANUS VACCINE  11/19/2025       Review of Systems   Constitutional: Negative for appetite change, chills, fatigue, fever and unexpected weight change.   HENT: Negative for nasal congestion, hearing loss, rhinorrhea and sore throat.    Eyes: Positive for redness and eye dryness (lacrimation). Negative for visual disturbance.        Lacrimation.   Respiratory: Negative for cough and shortness of breath.    Cardiovascular: Negative for chest pain, palpitations, leg swelling and claudication.   Gastrointestinal: Negative for abdominal pain, change in bowel habit, constipation, nausea and change in bowel habit.   Genitourinary: Negative for bladder incontinence, difficulty urinating and dysuria.   Musculoskeletal: Negative.    Neurological: Negative for dizziness, weakness, light-headedness, numbness and headaches.   Hematological: Does not bruise/bleed easily.   Psychiatric/Behavioral:  Negative for sleep disturbance. The patient is not nervous/anxious.       Past Medical History:   Diagnosis Date    Hypertension        Past Surgical History:   Procedure Laterality Date    ARTHROSCOPIC REPAIR OF ROTATOR CUFF OF SHOULDER Left 5/28/2021    Procedure: REPAIR, ROTATOR CUFF, ARTHROSCOPIC, AC resection;  Surgeon: Michael Short Jr., MD;  Location: [facility] OR;  Service: Orthopedics;  Laterality: Left;  need opus system  [employer] notified 5/11/21 CC    ARTHROSCOPY OF SHOULDER WITH DECOMPRESSION OF SUBACROMIAL SPACE  5/28/2021    Procedure: ARTHROSCOPY, SHOULDER, WITH SUBACROMIAL SPACE DECOMPRESSION;  Surgeon: Michael Short Jr., MD;  Location: [facility] OR;  Service: Orthopedics;;    CHOLECYSTECTOMY      HYSTERECTOMY      partial in her 40's    OOPHORECTOMY         Family History   Problem Relation Age of Onset    Heart attack Mother     Diabetes Mellitus Father     Hypertension Brother     Peripheral vascular disease Brother     Heart disease Sister     Hypertension Sister        Social History     Socioeconomic History    Marital status:    Tobacco Use    Smoking status: Never Smoker    Smokeless tobacco: Never Used   Substance and Sexual Activity    Alcohol use: No    Drug use: No    Sexual activity: Not Currently     Social Determinants of Health     Financial Resource Strain: Low Risk     Difficulty of Paying Living Expenses: Not hard at all   Food Insecurity: No Food Insecurity    Worried About Running Out of Food in the Last Year: Never true    Ran Out of Food in the Last Year: Never true   Transportation Needs: Unknown    Lack of Transportation (Non-Medical): No   Social Connections: Unknown    Frequency of Communication with Friends and Family: Twice a week    Frequency of Social Gatherings with Friends and Family: Twice a week       Current Outpatient Medications   Medication Sig Dispense Refill    amlodipine-benazepril 10-20mg (LOTREL) 10-20 mg per capsule Take 1  capsule by mouth once daily. 90 capsule 3    apixaban (ELIQUIS) 5 mg Tab Take 1 tablet (5 mg total) by mouth 2 (two) times daily. 180 tablet 3    aspirin 81 MG Chew Take 81 mg by mouth once daily.      donepeziL (ARICEPT) 5 MG tablet Take 1 tablet (5 mg total) by mouth every evening. 90 tablet 1    EUTHYROX 112 mcg tablet Take 112 mcg by mouth once daily.      oxybutynin (DITROPAN-XL) 5 MG TR24 Take 2 tablets (10 mg total) by mouth once daily. 180 tablet 3    cycloSPORINE (RESTASIS) 0.05 % ophthalmic emulsion Place 1 drop into both eyes 2 (two) times daily.      oxyCODONE-acetaminophen (PERCOCET) 7.5-325 mg per tablet Take 1 tablet by mouth every 8 (eight) hours as needed for Pain. (Patient not taking: Reported on 6/20/2022) 40 tablet 0    propylene glycoL (SYSTANE BALANCE) 0.6 % Drop Apply 2 drops to eye 2 (two) times daily. (Patient not taking: No sig reported) 3 Bottle 3     No current facility-administered medications for this visit.       Review of patient's allergies indicates:   Allergen Reactions    Penicillins Hives         Objective:       Last 3 sets of Vitals    Vitals - 1 value per visit 12/20/2021 6/20/2022 6/20/2022   SYSTOLIC 138 - 134   DIASTOLIC 73 - 86   Pulse 60 - 64   Temp - - 98.1   Resp - - -   SPO2 - - 97   Weight (lb) - - 159.17   Weight (kg) - - 72.2   Height - - 62   BMI (Calculated) - - 29.1   VISIT REPORT - - -   Pain Score  - 0 -   Some recent data might be hidden   Physical Exam  Constitutional:       General: She is not in acute distress.     Appearance: Normal appearance.   HENT:      Head: Normocephalic.      Right Ear: Tympanic membrane, ear canal and external ear normal.      Left Ear: Tympanic membrane, ear canal and external ear normal.      Nose: Nose normal.      Mouth/Throat:      Mouth: Mucous membranes are moist.   Eyes:      General: No scleral icterus.     Extraocular Movements: Extraocular movements intact.      Pupils: Pupils are equal, round, and reactive to  light.      Comments: Puffy eyelids, right eye redness, lacrimation.  Able to close them equally.   Neck:      Vascular: No carotid bruit.      Comments: Thyroid slightly prominent with no nodules.  Cardiovascular:      Rate and Rhythm: Normal rate and regular rhythm.      Pulses: Normal pulses.      Heart sounds: Normal heart sounds.   Pulmonary:      Effort: Pulmonary effort is normal.      Breath sounds: Normal breath sounds.   Abdominal:      General: Bowel sounds are normal. There is no distension.      Palpations: Abdomen is soft. There is no mass.      Tenderness: There is no abdominal tenderness.   Musculoskeletal:         General: No swelling. Normal range of motion.      Cervical back: Neck supple.   Lymphadenopathy:      Cervical: No cervical adenopathy.   Skin:     General: Skin is warm and dry.   Neurological:      General: No focal deficit present.      Mental Status: She is alert and oriented to person, place, and time.   Psychiatric:         Mood and Affect: Mood normal.         Behavior: Behavior normal.           CBC:  Recent Labs   Lab 04/11/21  0242 04/12/21  0028 10/15/21  0755   WBC 7.63 9.78 4.53   RBC 4.94 5.09 4.50   Hemoglobin 14.7 15.1 13.0   Hematocrit 44.4 44.7 39.3   Platelets 288 302 269   MCV 90 88 87   MCH 29.8 29.7 28.9   MCHC 33.1 33.8 33.1     CMP:  Recent Labs   Lab 04/12/21  0028 04/22/21  0955 05/25/21  0849 10/15/21  0755   Glucose 123 H 85   < > 92   Calcium 9.1 9.0   < > 9.0   Albumin 4.1 3.8  --  3.9   Total Protein 7.3 6.8  --  7.0   Sodium 142 144   < > 143   Potassium 4.3 3.4 L   < > 3.4 L   CO2 29 26   < > 27   Chloride 104 109   < > 109   BUN 12 12   < > 8   Creatinine 0.9 0.8   < > 0.8   Alkaline Phosphatase 101 148 H  --  110   ALT 13 54 H  --  14   AST 14 17  --  14   Total Bilirubin 0.4 0.4  --  0.5    < > = values in this interval not displayed.     URINALYSIS:  Recent Labs   Lab 10/15/21  0746   Color, UA Colorless A   Specific Gravity, UA 1.010   pH, UA 7.0    Protein, UA Negative   Nitrite, UA Negative   Leukocytes, UA Negative   Urobilinogen, UA Negative      LIPIDS:  Recent Labs   Lab 12/25/19  0909 01/08/20  0946 04/22/21  0955 10/15/21  0755   TSH <0.010 L  --  1.780 0.466   HDL  --  43  --   --    Cholesterol  --  169  --   --    Triglycerides  --  94  --   --    LDL Cholesterol  --  107.2  --   --    HDL/Cholesterol Ratio  --  25.4  --   --    Non-HDL Cholesterol  --  126  --   --    Total Cholesterol/HDL Ratio  --  3.9  --   --      TSH:  Recent Labs   Lab 12/25/19  0909 04/22/21  0955 10/15/21  0755   TSH <0.010 L 1.780 0.466       A1C:  Recent Labs   Lab 04/22/21  0955 06/09/21  0000   Hemoglobin A1C 5.2 5.1       Imaging:  CT Orbits Sella Post Fossa Without Cont  Narrative: EXAMINATION:  CT ORBITS SELLA POST FOSSA WITHOUT CONT    CLINICAL HISTORY:  Orbital trauma, visual defect, initial exam;THYROID DISEASE;  Thyrotoxicosis with diffuse goiter without thyrotoxic crisis or storm    TECHNIQUE:  Low-dose thin-section multislice axial CT images obtained through the orbits without the use of intravenous contrast.  Coronal and sagittal reformats were obtained.    COMPARISON:  None    FINDINGS:  No acute fracture seen.  Paranasal sinuses are clear with no air-fluid levels identified.    Exophthalmos with fairly symmetric thickening of the extraocular muscles.  Retro bulbar fat is clear.    Nasal cavity is clear.  Impression: No acute fracture seen.    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    Electronically signed by: Divya Oneill  Date:    07/27/2021  Time:    08:46      Assessment:       1. Essential hypertension    2. Thyroid eye disease    3. Paroxysmal A-fib    4. Dementia without behavioral disturbance, unspecified dementia type    5. Normocytic anemia    6. Colon cancer screening    7. Encounter for screening mammogram for malignant neoplasm of breast      is      Plan:       Flor was seen today for hypertension.    Diagnoses and all orders for this  visit:    Essential hypertension  -     Comprehensive Metabolic Panel; Future  -     Lipid Panel; Future  - blood pressure is usually stable but today diastolic mildly elevated. Add low dose HCTZ.  -  Will continue to monitor.  She is enrolled on igital medicine for HTN but no recordings have been documented (will notify [facility]).    Thyroid eye disease   - has appointment with ophthalmologist.     - Follow-up with endocrinology.    Paroxysmal A-fib  -     CBC Auto Differential; Future  - on chronic anticoagulation.  - stable heart rate.    Dementia without behavioral disturbance, unspecified dementia type   - On Donezepil. Neurologically stable.    Normocytic anemia  -     CBC Auto Differential; Future    Colon cancer screening  -     Fecal Immunochemical Test (iFOBT); Future  - Check with GI svc if colonoscopy needs to be repeated 10 years or before due to history of polyp.    Encounter for screening mammogram for malignant neoplasm of breast  -     Mammo Digital Screening Bilat w/ Jermain; Future    Other orders  -     hydroCHLOROthiazide (HYDRODIURIL) 12.5 MG Tab; Take 1 tablet (12.5 mg total) by mouth once daily.    And  Health Maintenance Due   Topic Date Due    Hepatitis C Screening  Never done    Pneumococcal Vaccines (Age 65+) (2 - PCV) 01/31/2019    COVID-19 Vaccine (4 - Booster) 03/20/2022    Mammogram  07/16/2022        Return to clinic in 6 months.    Kamryn New MD  [facility]  Disclaimer:  This note has been generated using voice-recognition software. There may be grammatical or spelling errors that have been missed during proof-reading

## 2022-06-21 ENCOUNTER — TELEPHONE (OUTPATIENT)
Dept: FAMILY MEDICINE | Facility: CLINIC | Age: 72
End: 2022-06-21
Payer: MEDICARE

## 2022-06-21 ENCOUNTER — LAB VISIT (OUTPATIENT)
Dept: LAB | Facility: HOSPITAL | Age: 72
End: 2022-06-21
Attending: INTERNAL MEDICINE
Payer: MEDICARE

## 2022-06-21 DIAGNOSIS — Z12.11 COLON CANCER SCREENING: Primary | ICD-10-CM

## 2022-06-21 DIAGNOSIS — Z86.010 HISTORY OF COLONOSCOPY WITH POLYPECTOMY: ICD-10-CM

## 2022-06-21 DIAGNOSIS — Z12.11 COLON CANCER SCREENING: ICD-10-CM

## 2022-06-21 DIAGNOSIS — Z98.890 HISTORY OF COLONOSCOPY WITH POLYPECTOMY: ICD-10-CM

## 2022-06-21 PROCEDURE — 82274 ASSAY TEST FOR BLOOD FECAL: CPT | Performed by: INTERNAL MEDICINE

## 2022-06-21 NOTE — TELEPHONE ENCOUNTER
Spoke with patient. Patient was asking for us to schedule her colonoscopy. Notified patient we dont schedule it we give the patient the number to schedule it. Provide patient w number [phone]

## 2022-06-21 NOTE — TELEPHONE ENCOUNTER
Documents reviewed and in colonoscopy document appears a surgical note from a surgically removed sessile polyp that pathology report shows tubular adenoma.  Case discussed with Gastroenterology and recommendations would be to repeat in 3 years.  She is due for colonoscopy and an order has been placed.  The patient was notified that the fit kit was changed to a colonoscopy request.

## 2022-06-21 NOTE — TELEPHONE ENCOUNTER
----- Message from Danae Colón sent at 6/21/2022  1:30 PM CDT -----  Contact: patient  [phone]  Patient calling to speak with you regarding scheduling a colonoscopy   Please advise

## 2022-06-24 ENCOUNTER — PATIENT OUTREACH (OUTPATIENT)
Dept: ADMINISTRATIVE | Facility: HOSPITAL | Age: 72
End: 2022-06-24
Payer: MEDICARE

## 2022-06-24 ENCOUNTER — NURSE TRIAGE (OUTPATIENT)
Dept: ADMINISTRATIVE | Facility: CLINIC | Age: 72
End: 2022-06-24
Payer: MEDICARE

## 2022-06-24 ENCOUNTER — TELEPHONE (OUTPATIENT)
Dept: FAMILY MEDICINE | Facility: CLINIC | Age: 72
End: 2022-06-24
Payer: MEDICARE

## 2022-06-24 DIAGNOSIS — E07.9 THYROID EYE DISEASE: Primary | ICD-10-CM

## 2022-06-24 DIAGNOSIS — H57.89 THYROID EYE DISEASE: Primary | ICD-10-CM

## 2022-06-24 NOTE — TELEPHONE ENCOUNTER
----- Message from Mariela Gerard sent at 6/24/2022  9:28 AM CDT -----  Type:  Patient Requesting Referral    Who Called:pt  Does the patient already have the specialty appointment scheduled?:yes for 07/19/22@9:00am  If yes, what is the date of that appointment?:  Referral to What Specialty:Thyroid specialist for eyes  Reason for Referral:eye disease  Does the patient want the referral with a specific physician?:yes  Is the specialist an [facility]?:  Patient Requesting a Response?:yes  Would the patient rather a call back or a response via [facility]? Please call  Best Call Back Number: [phone]           Additional Information: provider phone -[phone] [address] [place] [address] [address] fax number [phone]

## 2022-06-24 NOTE — TELEPHONE ENCOUNTER
----- Message from Mariela Gee sent at 6/24/2022  9:33 AM CDT -----  Type:  Needs Medical Advice    Who Called: pt  Symptoms (please be specific): pt needs a order to be placed for a colonoscopy to be done in october     Would the patient rather a call back or a response via [facility]? call  Best Call Back Number: [phone]  Additional Information: pt needs to get a return call to discuss

## 2022-06-25 NOTE — TELEPHONE ENCOUNTER
Call states she need code for [facility]. Unable to find link to send code. Please contact pt for further assistance.  Pt advised per protocol and verbalized understanding.    Reason for Disposition   Nursing judgment    Additional Information   Negative: Nursing judgment   Negative: Nursing judgment   Negative: Nursing judgment   Negative: Nursing judgment   Negative: Nursing judgment   Negative: Nursing judgment   Negative: Nursing judgment   Negative: Nursing judgment   Negative: Nursing judgment   Negative: Nursing judgment   Negative: Nursing judgment    Protocols used: ST NO GUIDELINE OR REFERENCE [id number]

## 2022-06-28 ENCOUNTER — PATIENT MESSAGE (OUTPATIENT)
Dept: FAMILY MEDICINE | Facility: CLINIC | Age: 72
End: 2022-06-28
Payer: MEDICARE

## 2022-06-28 LAB — HEMOCCULT STL QL IA: NEGATIVE

## 2022-07-18 ENCOUNTER — HOSPITAL ENCOUNTER (OUTPATIENT)
Dept: RADIOLOGY | Facility: HOSPITAL | Age: 72
Discharge: HOME | End: 2022-07-18
Attending: INTERNAL MEDICINE
Payer: MEDICARE

## 2022-07-18 DIAGNOSIS — Z12.31 ENCOUNTER FOR SCREENING MAMMOGRAM FOR MALIGNANT NEOPLASM OF BREAST: ICD-10-CM

## 2022-07-18 PROCEDURE — 77067 SCR MAMMO BI INCL CAD: CPT | Mod: TC

## 2022-07-18 PROCEDURE — 77063 BREAST TOMOSYNTHESIS BI: CPT | Mod: 26,,, | Performed by: RADIOLOGY

## 2022-07-18 PROCEDURE — 77067 MAMMO DIGITAL SCREENING BILAT WITH TOMO: ICD-10-PCS | Mod: 26,,, | Performed by: RADIOLOGY

## 2022-07-18 PROCEDURE — 77067 SCR MAMMO BI INCL CAD: CPT | Mod: 26,,, | Performed by: RADIOLOGY

## 2022-07-18 PROCEDURE — 77063 MAMMO DIGITAL SCREENING BILAT WITH TOMO: ICD-10-PCS | Mod: 26,,, | Performed by: RADIOLOGY

## 2022-07-18 PROCEDURE — 77063 BREAST TOMOSYNTHESIS BI: CPT | Mod: TC

## 2022-08-01 ENCOUNTER — TELEPHONE (OUTPATIENT)
Dept: FAMILY MEDICINE | Facility: CLINIC | Age: 72
End: 2022-08-01
Payer: MEDICARE

## 2022-08-01 NOTE — TELEPHONE ENCOUNTER
----- Message from Lisa Jolley sent at 8/1/2022  9:08 AM CDT -----  Regarding: schedule procedure  Type:  Patient Returning Call    Who Called:patient     Who Left Message for Patient:n/a    Does the patient know what this is regarding?:schedule procedure     Would the patient rather a call back or a response via [employer]? Call back     Best Call Back Number:[phone]  Additional Information: n/a

## 2022-08-02 ENCOUNTER — TELEPHONE (OUTPATIENT)
Dept: FAMILY MEDICINE | Facility: CLINIC | Age: 72
End: 2022-08-02
Payer: MEDICARE

## 2022-08-02 NOTE — TELEPHONE ENCOUNTER
----- Message from Fred Warren sent at 8/2/2022 12:24 PM CDT -----  Contact: pt  .Type:  Needs Medical Advice    Who Called: pt  Would the patient rather a call back or a response via [facility]? Call back  Best Call Back Number: [phone]   Additional Information: Pt. Is returning a call to the office.

## 2022-08-03 ENCOUNTER — TELEPHONE (OUTPATIENT)
Dept: FAMILY MEDICINE | Facility: CLINIC | Age: 72
End: 2022-08-03
Payer: MEDICARE

## 2022-08-03 ENCOUNTER — TELEPHONE (OUTPATIENT)
Dept: ENDOSCOPY | Facility: HOSPITAL | Age: 72
End: 2022-08-03
Payer: MEDICARE

## 2022-08-03 NOTE — TELEPHONE ENCOUNTER
Attempted to contact Folr Erwin to schedule procedure , no answer, left message with call back number

## 2022-08-03 NOTE — TELEPHONE ENCOUNTER
----- Message from Candace Valdivia sent at 8/3/2022 10:33 AM CDT -----  Regarding: call back  Contact: [phone]  Type:  Patient Returning Call    Who Called: PT   Who Left Message for Patient: Nurse   Does the patient know what this is regarding?: Yes   Would the patient rather a call back or a response via [facility]? Call back   Best Call Back Number: [phone]  Additional Information:

## 2022-08-04 ENCOUNTER — PATIENT MESSAGE (OUTPATIENT)
Dept: CARDIOLOGY | Facility: CLINIC | Age: 72
End: 2022-08-04
Payer: MEDICARE

## 2022-08-04 ENCOUNTER — TELEPHONE (OUTPATIENT)
Dept: ENDOSCOPY | Facility: HOSPITAL | Age: 72
End: 2022-08-04
Payer: MEDICARE

## 2022-08-04 RX ORDER — SODIUM, POTASSIUM,MAG SULFATES 17.5-3.13G
1 SOLUTION, RECONSTITUTED, ORAL ORAL DAILY
Qty: 1 KIT | Refills: 0 | Status: SHIPPED | OUTPATIENT
Start: 2022-08-04 | End: 2022-08-06

## 2022-08-04 NOTE — TELEPHONE ENCOUNTER
Endoscopy Scheduling Questionnaire:         1. Are you taking any blood thinners? Yes               If Yes  Have you been on them for longer than one year? Yes    2. Have you been diagnosed with Diverticulitis in past three months?  No    3. Are you on dialysis or have Kidney Disease? No    4. Previous Colonoscopy?  Yes         If yes Do you have a history of colon polyps?  Yes      6. Are you a diabetic?  No    7. Do you have a history of constipation?  No      Procedure scheduled with Dr. Cummings  on  08/29/2022    The prep being used is Suprep    The patient's prep instructions were sent by [employer]

## 2022-08-08 NOTE — PROGRESS NOTES
Notes: As below  Subjective:   @Patient ID:  Flor Erwin is a 72 y.o. female who presents for follow-up of Atrial fibrillation      HPI:    Here for follow up  Has been doing well since last visit  Plan to start Tappeza for thyroid eye disease   No chest pain, no palpitation  She is on Eliquis and tolerating it well   No tachy palpitations    No CVA, no TIA    She couldn't complete the event monitor due to allergic reaction to the leads.     Historically:    Patient was hospitalized 12/25 with A.fib with RVR which was a new onset.  Was admitted and converted to SR after Cardizem. Prior to that patient was diagnosed with new hyperthyroidism.     Event  Monitor 7/1/2020 ( Patient didn't complete it)    · Negative event monitor with no clinical arrhythmias.           Symptoms corresponding with normal sinus rhythm.      Prior cardiovascular  Hx  --------------------------------       · ECHO 12/2019 Normal left ventricular systolic function. The estimated ejection fraction is 65%  · Normal LV diastolic function.  · No wall motion abnormalities.  · Mild aortic regurgitation.  · Normal right ventricular systolic function.  · Normal central venous pressure (3 mm Hg).  The estimated PA systolic pressure is 36 mm Hg    - EKG sinus sophie, lAE      Patient Active Problem List    Diagnosis Date Noted    Dementia without behavioral disturbance, unspecified dementia type 06/20/2022    Pain of left upper extremity 06/16/2021    Weakness 06/16/2021    Stiffness due to immobility 06/16/2021    Nontraumatic complete tear of left rotator cuff 12/22/2020    Thyroid eye disease 05/13/2020    Refractive error 05/13/2020    Cortical cataract of both eyes 05/13/2020    Nuclear sclerosis of both eyes 05/13/2020    Dry eye syndrome of both eyes 05/13/2020    Nonrheumatic aortic valve insufficiency 01/08/2020    Paroxysmal A-fib 12/26/2019    Hyperthyroidism 12/26/2019    Essential hypertension 12/26/2019    Polyuria  2/2 hyperthyroidism 2019    Normocytic anemia 2019    Pruritus 2019           Right Arm BP - Sittin/77  Left Arm BP - Sittin/82        LAST HbA1c  Lab Results   Component Value Date    HGBA1C 5.1 2021       Lipid panel  Lab Results   Component Value Date    CHOL 169 2020     Lab Results   Component Value Date    HDL 43 2020     Lab Results   Component Value Date    LDLCALC 107.2 2020     Lab Results   Component Value Date    TRIG 94 2020     Lab Results   Component Value Date    CHOLHDL 25.4 2020            Review of Systems   Constitutional: Negative for chills and fever.   HENT: Negative for hearing loss and nosebleeds.    Eyes: Negative for blurred vision.   Cardiovascular: Positive for dyspnea on exertion. Negative for chest pain and palpitations.   Respiratory: Negative for hemoptysis and shortness of breath.    Hematologic/Lymphatic: Negative for bleeding problem.   Skin: Negative for itching.   Musculoskeletal: Negative for falls.   Gastrointestinal: Negative for abdominal pain and hematochezia.   Genitourinary: Negative for hematuria.   Neurological: Negative for dizziness and loss of balance.   Psychiatric/Behavioral: Negative for altered mental status and depression.       Objective:   Physical Exam  Constitutional:       Appearance: She is well-developed.   HENT:      Head: Normocephalic and atraumatic.   Eyes:      Comments: Conjunctival hyperemia, with exopthalmos   Neck:      Vascular: No JVD.   Cardiovascular:      Rate and Rhythm: Normal rate and regular rhythm.      Pulses:           Dorsalis pedis pulses are 2+ on the right side and 2+ on the left side.        Posterior tibial pulses are 2+ on the right side and 2+ on the left side.      Heart sounds: Normal heart sounds. No murmur heard.    No friction rub. No gallop.   Pulmonary:      Effort: Pulmonary effort is normal. No respiratory distress.      Breath sounds: Normal breath  sounds. No stridor. No wheezing.   Musculoskeletal:      Cervical back: Neck supple.   Skin:     General: Skin is warm and dry.   Neurological:      Mental Status: She is alert and oriented to person, place, and time.   Psychiatric:         Behavior: Behavior normal.            Assessment:     1. Paroxysmal A-fib    2. Essential hypertension    3. Nonrheumatic aortic valve insufficiency        Plan:     - CHADVASC is at least 3. Continue NOAC   -  We have discussed risk and benefits of OAC. Given sig risk for recurrence especially in the presence of underlying thyroid disease.  She would like to stay on OAC. Risks and benefits discussed.     - Will monitor for mild AI    - BP is well controlled. Continue current TTT    - F/U with endocrinology team     Pertinent cardiac images and EKG reviewed independently.    Continue with current medical plan and lifestyle changes.  Return sooner for concerns or questions. If symptoms persist go to the ED  I have reviewed all pertinent data including patient's medical history in detail and updated the computerized patient record.         Follow up as scheduled.     She expressed verbal understanding and agreed with the plan    Patient's Medications   New Prescriptions    No medications on file   Previous Medications    AMLODIPINE-BENAZEPRIL 10-20MG (LOTREL) 10-20 MG PER CAPSULE    Take 1 capsule by mouth once daily.    ASPIRIN 81 MG CHEW    Take 81 mg by mouth once daily.    CYCLOSPORINE (RESTASIS) 0.05 % OPHTHALMIC EMULSION    Place 1 drop into both eyes 2 (two) times daily.    DONEPEZIL (ARICEPT) 5 MG TABLET    Take 1 tablet (5 mg total) by mouth every evening.    ELIQUIS 5 MG TAB    TAKE 1 TABLET TWICE A DAY    EUTHYROX 112 MCG TABLET    Take 112 mcg by mouth once daily.    HYDROCHLOROTHIAZIDE (HYDRODIURIL) 12.5 MG TAB    Take 1 tablet (12.5 mg total) by mouth once daily.    OXYBUTYNIN (DITROPAN-XL) 5 MG TR24    Take 2 tablets (10 mg total) by mouth once daily.     OXYCODONE-ACETAMINOPHEN (PERCOCET) 7.5-325 MG PER TABLET    Take 1 tablet by mouth every 8 (eight) hours as needed for Pain.    PROPYLENE GLYCOL (SYSTANE BALANCE) 0.6 % DROP    Apply 2 drops to eye 2 (two) times daily.   Modified Medications    No medications on file   Discontinued Medications    No medications on file

## 2022-08-10 ENCOUNTER — OFFICE VISIT (OUTPATIENT)
Dept: CARDIOLOGY | Facility: CLINIC | Age: 72
End: 2022-08-10
Payer: MEDICARE

## 2022-08-10 VITALS
SYSTOLIC BLOOD PRESSURE: 129 MMHG | OXYGEN SATURATION: 99 % | HEIGHT: 62 IN | HEART RATE: 73 BPM | WEIGHT: 158.31 LBS | DIASTOLIC BLOOD PRESSURE: 82 MMHG | BODY MASS INDEX: 29.13 KG/M2

## 2022-08-10 DIAGNOSIS — I35.1 NONRHEUMATIC AORTIC VALVE INSUFFICIENCY: ICD-10-CM

## 2022-08-10 DIAGNOSIS — I48.0 PAROXYSMAL A-FIB: Primary | ICD-10-CM

## 2022-08-10 DIAGNOSIS — I10 ESSENTIAL HYPERTENSION: ICD-10-CM

## 2022-08-10 PROCEDURE — 99999 PR PBB SHADOW E&M-EST. PATIENT-LVL III: CPT | Mod: PBBFAC,,, | Performed by: INTERNAL MEDICINE

## 2022-08-10 PROCEDURE — 99214 OFFICE O/P EST MOD 30 MIN: CPT | Mod: S$PBB,,, | Performed by: INTERNAL MEDICINE

## 2022-08-10 PROCEDURE — 99999 PR PBB SHADOW E&M-EST. PATIENT-LVL III: ICD-10-PCS | Mod: PBBFAC,,, | Performed by: INTERNAL MEDICINE

## 2022-08-10 PROCEDURE — 99213 OFFICE O/P EST LOW 20 MIN: CPT | Mod: PBBFAC,PO | Performed by: INTERNAL MEDICINE

## 2022-08-10 PROCEDURE — 99214 PR OFFICE/OUTPT VISIT, EST, LEVL IV, 30-39 MIN: ICD-10-PCS | Mod: S$PBB,,, | Performed by: INTERNAL MEDICINE

## 2022-08-11 ENCOUNTER — TELEPHONE (OUTPATIENT)
Dept: FAMILY MEDICINE | Facility: CLINIC | Age: 72
End: 2022-08-11
Payer: MEDICARE

## 2022-08-11 ENCOUNTER — PES CALL (OUTPATIENT)
Dept: ADMINISTRATIVE | Facility: OTHER | Age: 72
End: 2022-08-11
Payer: MEDICARE

## 2022-08-11 DIAGNOSIS — N89.8 VAGINAL ITCHING: Primary | ICD-10-CM

## 2022-08-11 NOTE — TELEPHONE ENCOUNTER
Patient has been having vaginal itching  , inform patient a urine sample will be required to make sure she does not have a UTI patient voiced understanding

## 2022-08-11 NOTE — TELEPHONE ENCOUNTER
----- Message from Danae Colón sent at 8/11/2022  8:59 AM CDT -----  Contact: patient/ [phone]  Patient requesting to speak with you regarding severe vaginal itching   Pharmacy: [id number] - [address]   Phone:  [phone]

## 2022-08-12 ENCOUNTER — TELEPHONE (OUTPATIENT)
Dept: ENDOSCOPY | Facility: HOSPITAL | Age: 72
End: 2022-08-12
Payer: MEDICARE

## 2022-08-12 ENCOUNTER — PES CALL (OUTPATIENT)
Dept: ADMINISTRATIVE | Facility: CLINIC | Age: 72
End: 2022-08-12
Payer: MEDICARE

## 2022-08-12 ENCOUNTER — TELEPHONE (OUTPATIENT)
Dept: FAMILY MEDICINE | Facility: CLINIC | Age: 72
End: 2022-08-12
Payer: MEDICARE

## 2022-08-12 ENCOUNTER — LAB VISIT (OUTPATIENT)
Dept: LAB | Facility: HOSPITAL | Age: 72
End: 2022-08-12
Attending: INTERNAL MEDICINE
Payer: MEDICARE

## 2022-08-12 DIAGNOSIS — N89.8 VAGINAL ITCHING: ICD-10-CM

## 2022-08-12 LAB
BILIRUB UR QL STRIP: NEGATIVE
CLARITY UR: CLEAR
COLOR UR: YELLOW
GLUCOSE UR QL STRIP: NEGATIVE
HGB UR QL STRIP: NEGATIVE
KETONES UR QL STRIP: NEGATIVE
LEUKOCYTE ESTERASE UR QL STRIP: NEGATIVE
NITRITE UR QL STRIP: NEGATIVE
PH UR STRIP: 6 [PH] (ref 5–8)
PROT UR QL STRIP: NEGATIVE
SP GR UR STRIP: 1.02 (ref 1–1.03)
URN SPEC COLLECT METH UR: NORMAL
UROBILINOGEN UR STRIP-ACNC: NEGATIVE EU/DL

## 2022-08-12 PROCEDURE — 81003 URINALYSIS AUTO W/O SCOPE: CPT | Performed by: INTERNAL MEDICINE

## 2022-08-12 RX ORDER — FLUCONAZOLE 150 MG/1
150 TABLET ORAL DAILY
Qty: 1 TABLET | Refills: 0 | Status: SHIPPED | OUTPATIENT
Start: 2022-08-12 | End: 2022-08-13

## 2022-08-12 NOTE — TELEPHONE ENCOUNTER
----- Message from Kamryn New MD sent at 8/12/2022 12:40 PM CDT -----  UA was ok but because of the symptoms I sent  a prescription of single dose Diflucan. Please let patient know not to take it at the same time with Donezepil or hold it for a day. Thanks.  ----- Message -----  From: Carey Talbot MA  Sent: 8/11/2022   5:11 PM CDT  To: Kamryn New MD    Patient has been having vaginal itching  , inform patient a urine sample will be required to make sure she does not have a UTI patient is going to lab tomorrow

## 2022-08-12 NOTE — TELEPHONE ENCOUNTER
Informed patient of UA results also informed her to hold on Donepenzil  Patient voiced understanding .

## 2022-08-15 ENCOUNTER — PATIENT MESSAGE (OUTPATIENT)
Dept: FAMILY MEDICINE | Facility: CLINIC | Age: 72
End: 2022-08-15
Payer: MEDICARE

## 2022-08-15 NOTE — TELEPHONE ENCOUNTER
Ok to hold Eliquis 2 days prior and restart post.     Sincerely,  Chema Mujica MD.   Interventional Cardiologist  [facility] [place]

## 2022-08-16 ENCOUNTER — TELEPHONE (OUTPATIENT)
Dept: ENDOSCOPY | Facility: HOSPITAL | Age: 72
End: 2022-08-16
Payer: MEDICARE

## 2022-08-16 NOTE — TELEPHONE ENCOUNTER
----- Message from Daisy Borges Patient Care Assistant sent at 8/16/2022  8:58 AM CDT -----  Type:  Patient Returning Call    Who Called: pt  Who Left Message for Patient: office  Does the patient know what this is regarding?: yes  Would the patient rather a call back or a response via [facility]?  [facility]  Best Call Back Number   Additional Information:

## 2022-08-16 NOTE — TELEPHONE ENCOUNTER
Spoke to daughter Tatyana , explained she needs to hold blood thinner 2 days prior to procedure per Cardiologist. verbalized understanding

## 2022-08-16 NOTE — TELEPHONE ENCOUNTER
Attempted to contact Flor Erwin  to given cardiac hold instructions no answer LVM with call back number

## 2022-08-25 ENCOUNTER — TELEPHONE (OUTPATIENT)
Dept: ENDOSCOPY | Facility: HOSPITAL | Age: 72
End: 2022-08-25
Payer: MEDICARE

## 2022-08-25 PROBLEM — N32.81 OVERACTIVE BLADDER: Status: ACTIVE | Noted: 2018-05-15

## 2022-08-25 PROBLEM — M17.9 OSTEOARTHRITIS OF KNEE: Status: ACTIVE | Noted: 2022-08-25

## 2022-08-25 NOTE — TELEPHONE ENCOUNTER
Spoke with patient about arrival time @ 0800.   Covid test = boosted    Prep instructions reviewed: the day before the procedure, follow a clear liquid diet all day, then start the first 1/2 of prep at 5pm and take 2nd 1/2 of prep @ 0300.  Pt must be completely NPO when prep completed @ 0500.              Medications: Do not take Insulin or oral diabetic medications the day of the procedure.  Take as prescribed: heart, seizure and blood pressure medication in the morning with a sip of water (less than an ounce).  Take any breathing medications and bring inhalers to hospital with you Leave all valuables and jewelry at home.     Wear comfortable clothes to procedure to change into hospital gown You cannot drive for 24 hours after your procedure because you will receive sedation for your procedure to make you comfortable.  A ride must be provided at discharge.

## 2022-08-25 NOTE — PROGRESS NOTES
Flor Erwin presented for a  Medicare AWV and comprehensive Health Risk Assessment today. The following components were reviewed and updated:    Medical history  Family History  Social history  Allergies and Current Medications  Health Risk Assessment  Health Maintenance  Care Team         ** See Completed Assessments for Annual Wellness Visit within the encounter summary.**         The following assessments were completed:  Living Situation  CAGE  Depression Screening  Timed Get Up and Go  Whisper Test  Cognitive Function Screening  Nutrition Screening  ADL Screening  PAQ Screening    Review for Opioid Screening: Pt does not have Rx for Opioids      Review for Substance Use Disorders: Patient does not use substance        Current Outpatient Medications:     amlodipine-benazepril 10-20mg (LOTREL) 10-20 mg per capsule, Take 1 capsule by mouth once daily., Disp: 90 capsule, Rfl: 3    cycloSPORINE (RESTASIS) 0.05 % ophthalmic emulsion, Place 1 drop into both eyes 2 (two) times daily., Disp: , Rfl:     donepeziL (ARICEPT) 5 MG tablet, Take 1 tablet (5 mg total) by mouth every evening., Disp: 90 tablet, Rfl: 1    ELIQUIS 5 mg Tab, TAKE 1 TABLET TWICE A DAY, Disp: 180 tablet, Rfl: 3    EUTHYROX 112 mcg tablet, Take 112 mcg by mouth once daily., Disp: , Rfl:     oxybutynin (DITROPAN-XL) 5 MG TR24, Take 2 tablets (10 mg total) by mouth once daily., Disp: 180 tablet, Rfl: 3    propylene glycoL (SYSTANE BALANCE) 0.6 % Drop, Apply 2 drops to eye 2 (two) times daily. (Patient not taking: Reported on 9/7/2022), Disp: 3 Bottle, Rfl: 3    cetirizine (ZYRTEC) 10 MG tablet, Take 1 tablet (10 mg total) by mouth once daily., Disp: 30 tablet, Rfl: 2    pramoxine (SARNA SENSITIVE) 1 % Lotn, Apply 1 application topically 2 (two) times a day., Disp: 222 mL, Rfl: 2    triamcinolone acetonide 0.1% (KENALOG) 0.1 % ointment, Apply topically 2 (two) times daily as needed (itching)., Disp: 30 g, Rfl: 2  No current facility-administered  "medications for this visit.    Facility-Administered Medications Ordered in Other Visits:     simethicone 40 mg/0.6 mL drops, , , PRN, Prince Cummings MD, 40 mg at 08/29/22 0938       Vitals:    09/06/22 0901   BP: (!) 150/80   BP Location: Left arm   Patient Position: Sitting   Pulse: 66   SpO2: 97%   Weight: 71.9 kg (158 lb 8.2 oz)   Height: 5' 2" (1.575 m)     Body mass index is 28.99 kg/m².  Physical Exam  Vitals and nursing note reviewed.   Constitutional:       General: She is not in acute distress.     Appearance: Normal appearance. She is not ill-appearing.   HENT:      Head: Normocephalic and atraumatic.   Eyes:      General: No scleral icterus.        Right eye: No discharge.         Left eye: No discharge.      Extraocular Movements: Extraocular movements intact.      Conjunctiva/sclera: Conjunctivae normal.   Cardiovascular:      Rate and Rhythm: Normal rate and regular rhythm.      Heart sounds: Normal heart sounds. No murmur heard.  Pulmonary:      Effort: Pulmonary effort is normal. No respiratory distress.      Breath sounds: Normal breath sounds. No wheezing, rhonchi or rales.   Musculoskeletal:      Cervical back: Normal range of motion.      Right lower leg: No edema.      Left lower leg: No edema.   Skin:     General: Skin is warm.      Findings: No rash.      Comments: Skin dry    Neurological:      Mental Status: She is alert and oriented to person, place, and time.   Psychiatric:         Mood and Affect: Mood normal.         Behavior: Behavior normal. Behavior is cooperative.         Cognition and Memory: Cognition normal. Memory is impaired.             Diagnoses and health risks identified today and associated recommendations/orders:    1. Encounter for preventive health examination  - Chart reviewed. Problem list updated. Discussed current medical diagnosis, current medications, medical/surgical/family/social history; updated provider list; documented vital signs; identified any cognitive " impairment; and updated risk factor list. Addressed any outstanding health maintenance. Provided patient with personalized health advice. Continue to follow up with PCP and any specialists.       2. Dementia without behavioral disturbance, unspecified dementia type  Chronic; stable on current treatment plan; follow up with PCP  - taking aricept    3. Paroxysmal A-fib  Chronic; stable on current treatment plan; follow up with PCP  - taking eliquis     4. Hyperthyroidism  Chronic; stable on current treatment plan; follow up with PCP  - taking euthyrox  - still with chronic pruritis; follow up with PCP    5. Nonrheumatic aortic valve insufficiency  Chronic; stable on current treatment plan; follow up with PCP    6. Polyuria 2/2 hyperthyroidism  Chronic; stable on current treatment plan; follow up with PCP    7. Overactive bladder  Chronic; stable on current treatment plan; follow up with PCP  - taking oxybutynin    8. Essential hypertension  Chronic; stable on current treatment plan; follow up with PCP  - no longer taking HCTZ, self discontinued due to itching  - BP elevated today; follow up with PCP tomorrow    9. BMI 28.0-28.9,adult  - Recommendation for healthy diet and increasing exercise as tolerated with goal of 150min/week . Recommend weight loss        Provided Flor with a 5-10 year written screening schedule and personal prevention plan. Recommendations were developed using the USPSTF age appropriate recommendations. Education, counseling, and referrals were provided as needed. After Visit Summary printed and given to patient which includes a list of additional screenings\tests needed.    Follow up in about 1 year (around 9/6/2023) for your next annual wellness visit.    Ellie Cat, YVON-C    Advance Care Planning     I offered to discuss advanced care planning, including how to pick a person who would make decisions for you if you were unable to make them for yourself, called a health care power of  , and what kind of decisions you might make such as use of life sustaining treatments such as ventilators and tube feeding when faced with a life limiting illness recorded on a living will that they will need to know. (How you want to be cared for as you near the end of your natural life)     X  Patient has advanced directives written and agrees to provide copies to the institution.

## 2022-08-29 ENCOUNTER — ANESTHESIA EVENT (OUTPATIENT)
Dept: ENDOSCOPY | Facility: HOSPITAL | Age: 72
End: 2022-08-29
Payer: MEDICARE

## 2022-08-29 ENCOUNTER — ANESTHESIA (OUTPATIENT)
Dept: ENDOSCOPY | Facility: HOSPITAL | Age: 72
End: 2022-08-29
Payer: MEDICARE

## 2022-08-29 ENCOUNTER — HOSPITAL ENCOUNTER (OUTPATIENT)
Facility: HOSPITAL | Age: 72
Discharge: HOME | End: 2022-08-29
Attending: INTERNAL MEDICINE | Admitting: INTERNAL MEDICINE
Payer: MEDICARE

## 2022-08-29 VITALS
HEART RATE: 70 BPM | OXYGEN SATURATION: 98 % | SYSTOLIC BLOOD PRESSURE: 129 MMHG | RESPIRATION RATE: 16 BRPM | BODY MASS INDEX: 27.23 KG/M2 | HEIGHT: 62 IN | DIASTOLIC BLOOD PRESSURE: 59 MMHG | TEMPERATURE: 98 F | WEIGHT: 148 LBS

## 2022-08-29 DIAGNOSIS — Z12.11 ENCOUNTER FOR COLORECTAL CANCER SCREENING: ICD-10-CM

## 2022-08-29 DIAGNOSIS — Z12.12 ENCOUNTER FOR COLORECTAL CANCER SCREENING: ICD-10-CM

## 2022-08-29 PROCEDURE — 45385 PR COLONOSCOPY,REMV LESN,SNARE: ICD-10-PCS | Mod: PT,,, | Performed by: INTERNAL MEDICINE

## 2022-08-29 PROCEDURE — 37000008 HC ANESTHESIA 1ST 15 MINUTES: Performed by: INTERNAL MEDICINE

## 2022-08-29 PROCEDURE — 25000003 PHARM REV CODE 250: Performed by: INTERNAL MEDICINE

## 2022-08-29 PROCEDURE — 27201089 HC SNARE, DISP (ANY): Performed by: INTERNAL MEDICINE

## 2022-08-29 PROCEDURE — 45385 COLONOSCOPY W/LESION REMOVAL: CPT | Mod: PT | Performed by: INTERNAL MEDICINE

## 2022-08-29 PROCEDURE — 25000003 PHARM REV CODE 250: Performed by: NURSE ANESTHETIST, CERTIFIED REGISTERED

## 2022-08-29 PROCEDURE — 37000009 HC ANESTHESIA EA ADD 15 MINS: Performed by: INTERNAL MEDICINE

## 2022-08-29 PROCEDURE — 63600175 PHARM REV CODE 636 W HCPCS: Performed by: NURSE ANESTHETIST, CERTIFIED REGISTERED

## 2022-08-29 PROCEDURE — 45385 COLONOSCOPY W/LESION REMOVAL: CPT | Mod: PT,,, | Performed by: INTERNAL MEDICINE

## 2022-08-29 RX ORDER — LIDOCAINE HYDROCHLORIDE 20 MG/ML
INJECTION INTRAVENOUS
Status: DISCONTINUED | OUTPATIENT
Start: 2022-08-29 | End: 2022-08-29

## 2022-08-29 RX ORDER — PROPOFOL 10 MG/ML
VIAL (ML) INTRAVENOUS
Status: DISCONTINUED | OUTPATIENT
Start: 2022-08-29 | End: 2022-08-29

## 2022-08-29 RX ORDER — DEXTROMETHORPHAN/PSEUDOEPHED 2.5-7.5/.8
DROPS ORAL
Status: SHIPPED | OUTPATIENT
Start: 2022-08-29

## 2022-08-29 RX ORDER — PROPOFOL 10 MG/ML
VIAL (ML) INTRAVENOUS CONTINUOUS PRN
Status: DISCONTINUED | OUTPATIENT
Start: 2022-08-29 | End: 2022-08-29

## 2022-08-29 RX ORDER — SODIUM CHLORIDE 9 MG/ML
INJECTION, SOLUTION INTRAVENOUS CONTINUOUS
Status: DISCONTINUED | OUTPATIENT
Start: 2022-08-29 | End: 2022-08-29 | Stop reason: HOSPADM

## 2022-08-29 RX ORDER — SODIUM CHLORIDE 0.9 % (FLUSH) 0.9 %
10 SYRINGE (ML) INJECTION
Status: DISCONTINUED | OUTPATIENT
Start: 2022-08-29 | End: 2022-08-29 | Stop reason: HOSPADM

## 2022-08-29 RX ADMIN — PROPOFOL 80 MG: 10 INJECTION, EMULSION INTRAVENOUS at 09:08

## 2022-08-29 RX ADMIN — PROPOFOL 125 MCG/KG/MIN: 10 INJECTION, EMULSION INTRAVENOUS at 09:08

## 2022-08-29 RX ADMIN — SODIUM CHLORIDE: 0.9 INJECTION, SOLUTION INTRAVENOUS at 08:08

## 2022-08-29 RX ADMIN — LIDOCAINE HYDROCHLORIDE 20 MG: 20 INJECTION, SOLUTION INTRAVENOUS at 09:08

## 2022-08-29 NOTE — TRANSFER OF CARE
"Anesthesia Transfer of Care Note    Patient: Flor Erwin    Procedure(s) Performed: Procedure(s) (LRB):  COLONOSCOPY Suprep (N/A)    Patient location: Other: endo pacu    Anesthesia Type: MAC    Transport from OR: Transported from OR on room air with adequate spontaneous ventilation    Post pain: adequate analgesia    Post assessment: no apparent anesthetic complications    Post vital signs: stable    Level of consciousness: awake and alert    Nausea/Vomiting: no nausea/vomiting    Complications: none    Transfer of care protocol was followed      Last vitals:   Visit Vitals  BP (!) 183/72   Pulse 85   Temp 36.6 °C (97.9 °F)   Resp 18   Ht 5' 2" (1.575 m)   Wt 67.1 kg (148 lb)   LMP  (LMP Unknown)   SpO2 100%   Breastfeeding No   BMI 27.07 kg/m²     "

## 2022-08-29 NOTE — ANESTHESIA POSTPROCEDURE EVALUATION
Anesthesia Post Evaluation    Patient: Flor Erwin    Procedure(s) Performed: Procedure(s) (LRB):  COLONOSCOPY Suprep (N/A)    Final Anesthesia Type: MAC      Patient location during evaluation: GI PACU  Patient participation: Yes- Able to Participate  Level of consciousness: awake and alert and awake  Post-procedure vital signs: reviewed and stable  Pain management: adequate  Airway patency: patent    PONV status at discharge: No PONV  Anesthetic complications: no      Cardiovascular status: blood pressure returned to baseline, hemodynamically stable and stable  Respiratory status: spontaneous ventilation, unassisted and room air  Hydration status: euvolemic  Follow-up not needed.          Vitals Value Taken Time   /72 08/29/22 0830   Temp 36.6 °C (97.9 °F) 08/29/22 0829   Pulse 85 08/29/22 0829   Resp 18 08/29/22 0829   SpO2 100 % 08/29/22 0829         No case tracking events are documented in the log.      Pain/Jensen Score: No data recorded

## 2022-08-29 NOTE — PROVATION PATIENT INSTRUCTIONS
Discharge Summary/Instructions after an Endoscopic Procedure  Patient Name: Flor Erwin  Patient MRN: [id number]  Patient YOB: 1950 Monday, August 29, 2022  Prince Cummings MD  Dear patient,  As a result of recent federal legislation (The Federal Cures Act), you may   receive lab or pathology results from your procedure in your [facility]   account before your physician is able to contact you. Your physician or   their representative will relay the results to you with their   recommendations at their soonest availability.  Thank you,  Your health is very important to us during the Covid Crisis. Following your   procedure today, you will receive a daily text for 2 weeks asking about   signs or symptoms of Covid 19.  Please respond to this text when you   receive it so we can follow up and keep you as safe as possible.   RESTRICTIONS:  During your procedure today, you received medications for sedation.  These   medications may affect your judgment, balance and coordination.  Therefore,   for 24 hours, you have the following restrictions:   - DO NOT drive a car, operate machinery, make legal/financial decisions,   sign important papers or drink alcohol.    ACTIVITY:  Today: no heavy lifting, straining or running due to procedural   sedation/anesthesia.  The following day: return to full activity including work.  DIET:  Eat and drink normally unless instructed otherwise.     TREATMENT FOR COMMON SIDE EFFECTS:  - Mild abdominal pain, nausea, belching, bloating or excessive gas:  rest,   eat lightly and use a heating pad.  - Sore Throat: treat with throat lozenges and/or gargle with warm salt   water.  - Because air was used during the procedure, expelling large amounts of air   from your rectum or belching is normal.  - If a bowel prep was taken, you may not have a bowel movement for 1-3 days.    This is normal.  SYMPTOMS TO WATCH FOR AND REPORT TO YOUR PHYSICIAN:  1. Abdominal pain or bloating, other than gas  cramps.  2. Chest pain.  3. Back pain.  4. Signs of infection such as: chills or fever occurring within 24 hours   after the procedure.  5. Rectal bleeding, which would show as bright red, maroon, or black stools.   (A tablespoon of blood from the rectum is not serious, especially if   hemorrhoids are present.)  6. Vomiting.  7. Weakness or dizziness.  GO DIRECTLY TO THE NEAREST EMERGENCY ROOM IF YOU HAVE ANY OF THE FOLLOWING:      Difficulty breathing              Chills and/or fever over 101 F   Persistent vomiting and/or vomiting blood   Severe abdominal pain   Severe chest pain   Black, tarry stools   Bleeding- more than one tablespoon   Any other symptom or condition that you feel may need urgent attention  Your doctor recommends these additional instructions:  If any biopsies were taken, your doctors clinic will contact you in 1 to 2   weeks with any results.  - Discharge patient to home.   - Resume previous diet.   - Continue present medications.   - Await pathology results.   - Repeat colonoscopy in 5 years for surveillance.   - Return to primary care physician as previously scheduled.  For questions, problems or results please call your physician - Prince Cummings MD.  EMERGENCY PHONE NUMBER: [phone],  LAB RESULTS: [phone]  IF A COMPLICATION OR EMERGENCY SITUATION ARISES AND YOU ARE UNABLE TO REACH   YOUR PHYSICIAN - GO DIRECTLY TO THE EMERGENCY ROOM.  Prince Cummings MD  8/29/2022 9:53:47 AM  This report has been verified and signed electronically.  Dear patient,  As a result of recent federal legislation (The Federal Cures Act), you may   receive lab or pathology results from your procedure in your [facility]   account before your physician is able to contact you. Your physician or   their representative will relay the results to you with their   recommendations at their soonest availability.  Thank you,  PROVATION

## 2022-08-29 NOTE — H&P
Short Stay Endoscopy History and Physical    PCP - Kamryn New MD  Referring Physician - Kamryn New MD  [address]  [place] [id number]    Procedure - colonoscopy  ASA - per anesthesia  Mallampati - per anesthesia  History of Anesthesia problems - no  Family history Anesthesia problems -  no   Plan of anesthesia - General    HPI:  This is a 72 y.o. female here for evaluation of: surveillance    Reflux - no  Dysphagia - no  Abdominal pain - no  Diarrhea - no    ROS:  Constitutional: No fevers, chills, No weight loss  CV: No chest pain  Pulm: No cough, No shortness of breath  Ophtho: No vision changes  GI: see HPI  Derm: No rash    Medical History:  has a past medical history of Hypertension.    Surgical History:  has a past surgical history that includes Cholecystectomy; Oophorectomy; Hysterectomy; Arthroscopic repair of rotator cuff of shoulder (Left, 5/28/2021); and Arthroscopy of shoulder with decompression of subacromial space (5/28/2021).    Family History: family history includes Diabetes Mellitus in her father; Heart attack in her mother; Heart disease in her sister; Hypertension in her brother and sister; Peripheral vascular disease in her brother..    Social History:  reports that she has never smoked. She has never used smokeless tobacco. She reports that she does not drink alcohol and does not use drugs.    Review of patient's allergies indicates:   Allergen Reactions    Penicillins Hives       Medications:   Medications Prior to Admission   Medication Sig Dispense Refill Last Dose    amlodipine-benazepril 10-20mg (LOTREL) 10-20 mg per capsule Take 1 capsule by mouth once daily. 90 capsule 3 8/29/2022    cycloSPORINE (RESTASIS) 0.05 % ophthalmic emulsion Place 1 drop into both eyes 2 (two) times daily.   8/28/2022    donepeziL (ARICEPT) 5 MG tablet Take 1 tablet (5 mg total) by mouth every evening. 90 tablet 1 8/28/2022    EUTHYROX 112 mcg tablet Take 112 mcg by mouth once daily.    8/29/2022    hydroCHLOROthiazide (HYDRODIURIL) 12.5 MG Tab Take 1 tablet (12.5 mg total) by mouth once daily. 90 tablet 3 8/28/2022    oxybutynin (DITROPAN-XL) 5 MG TR24 Take 2 tablets (10 mg total) by mouth once daily. 180 tablet 3 8/28/2022    propylene glycoL (SYSTANE BALANCE) 0.6 % Drop Apply 2 drops to eye 2 (two) times daily. 3 Bottle 3 8/28/2022    aspirin 81 MG Chew Take 81 mg by mouth once daily.       ELIQUIS 5 mg Tab TAKE 1 TABLET TWICE A  tablet 3 8/26/2022    oxyCODONE-acetaminophen (PERCOCET) 7.5-325 mg per tablet Take 1 tablet by mouth every 8 (eight) hours as needed for Pain. (Patient not taking: No sig reported) 40 tablet 0        Physical Exam:    Vital Signs:   Vitals:    08/29/22 0830   BP: (!) 183/72   Pulse:    Resp:    Temp:        General Appearance: Well appearing in no acute distress    Labs:  Lab Results   Component Value Date    WBC 4.53 10/15/2021    HGB 13.0 10/15/2021    HCT 39.3 10/15/2021     10/15/2021    CHOL 169 01/08/2020    TRIG 94 01/08/2020    HDL 43 01/08/2020    ALT 14 10/15/2021    AST 14 10/15/2021     10/15/2021    K 3.4 (L) 10/15/2021     10/15/2021    CREATININE 0.8 10/15/2021    BUN 8 10/15/2021    CO2 27 10/15/2021    TSH 0.466 10/15/2021    INR 1.0 12/25/2019    HGBA1C 5.1 06/09/2021       I have explained the risks and benefits of this endoscopic procedure to the patient including but not limited to bleeding, inflammation, infection, perforation, and death.      Prince Cummings MD

## 2022-08-29 NOTE — ANESTHESIA PREPROCEDURE EVALUATION
08/29/2022  Flor Erwin is a 72 y.o., female with hypothyroidism, pafib on Eliquis and HTN scheduled for colonoscopy.     Past Medical History:   Diagnosis Date    Hypertension      Past Surgical History:   Procedure Laterality Date    ARTHROSCOPIC REPAIR OF ROTATOR CUFF OF SHOULDER Left 5/28/2021    Procedure: REPAIR, ROTATOR CUFF, ARTHROSCOPIC, AC resection;  Surgeon: Michael Short Jr., MD;  Location: [facility] OR;  Service: Orthopedics;  Laterality: Left;  need opus system  [facility] notified 5/11/21 CC    ARTHROSCOPY OF SHOULDER WITH DECOMPRESSION OF SUBACROMIAL SPACE  5/28/2021    Procedure: ARTHROSCOPY, SHOULDER, WITH SUBACROMIAL SPACE DECOMPRESSION;  Surgeon: Michael Short Jr., MD;  Location: [facility] OR;  Service: Orthopedics;;    CHOLECYSTECTOMY      HYSTERECTOMY      partial in her 40's    OOPHORECTOMY         Anesthesia Evaluation    I have reviewed the Patient Summary Reports.    I have reviewed the Nursing Notes.    I have reviewed the Medications.     Review of Systems  Anesthesia Hx:  No problems with previous Anesthesia  Denies Family Hx of Anesthesia complications.    Social:  Non-Smoker, No Alcohol Use    Hematology/Oncology:  Hematology Normal      Hematology Comments: Stopped Eliquis/ASA 5/24   Cardiovascular:   Exercise tolerance: good Hypertension Dysrhythmias (pAF, 1 episode when dx'd hyperthyroid)  Denies Angina.  Denies ALFREDO.    Pulmonary:  Pulmonary Normal    Renal/:  Renal/ Normal     Hepatic/GI:  Hepatic/GI Normal    Neurological:  Neurology Normal    Endocrine:   Hypothyroidism  Thyroid Disease Hx of Hyperthyroidism, Treated Current functional status of Hypothyroid        Physical Exam  General:  Well nourished      Airway/Jaw/Neck:  Airway Findings: Mouth Opening: Normal   Tongue: Normal   General Airway Assessment: Adult Mallampati: II  TM Distance: Normal, at  least 6 cm      Eyes/Ears/Nose:  Eyes/Ears/Nose Findings: Exophthalmos    Dental:  Dental Findings: Upper Dentures      Chest/Lungs:  Chest/Lungs Findings: Clear to auscultation, Normal Respiratory Rate      Heart/Vascular:  Heart Findings: Rate: Normal  Rhythm: Regular Rhythm        Mental Status:  Mental Status Findings:  Cooperative, Alert and Oriented       TTE 12/26/2019  · Normal left ventricular systolic function. The estimated ejection fraction is 65%  · Normal LV diastolic function.  · No wall motion abnormalities.  · Mild aortic regurgitation.  · Normal right ventricular systolic function.  · Normal central venous pressure (3 mm Hg).  · The estimated PA systolic pressure is 36 mm Hg      Anesthesia Plan  Type of Anesthesia, risks & benefits discussed:  Anesthesia Type:  MAC, general    Patient's Preference:   Plan Factors:          Intra-op Monitoring Plan: standard ASA monitors  Intra-op Monitoring Plan Comments:   Post Op Pain Control Plan: multimodal analgesia  Post Op Pain Control Plan Comments:     Induction:   IV  Beta Blocker:         Informed Consent: Informed consent signed with the Patient and all parties understand the risks and agree with anesthesia plan.  All questions answered.  Anesthesia consent signed with patient.  ASA Score: 2     Day of Surgery Review of History & Physical:        Anesthesia Plan Notes:           Ready For Surgery From Anesthesia Perspective.           Physical Exam  General: Well nourished    Airway:  Mallampati: II   Mouth Opening: Normal  TM Distance: Normal, at least 6 cm  Tongue: Normal    Dental:  Upper Dentures    Chest/Lungs:  Clear to auscultation, Normal Respiratory Rate    Heart:  Rate: Normal  Rhythm: Regular Rhythm          Anesthesia Plan  Type of Anesthesia, risks & benefits discussed:    Anesthesia Type: MAC, general  Intra-op Monitoring Plan: standard ASA monitors  Post Op Pain Control Plan: multimodal analgesia  Induction:  IV  Informed Consent:  Informed consent signed with the Patient and all parties understand the risks and agree with anesthesia plan.  All questions answered.   ASA Score: 2  Anesthesia Plan Notes:       Ready For Surgery From Anesthesia Perspective.       .

## 2022-08-30 ENCOUNTER — TELEPHONE (OUTPATIENT)
Dept: ENDOSCOPY | Facility: HOSPITAL | Age: 72
End: 2022-08-30
Payer: MEDICARE

## 2022-09-06 ENCOUNTER — OFFICE VISIT (OUTPATIENT)
Dept: FAMILY MEDICINE | Facility: CLINIC | Age: 72
End: 2022-09-06
Payer: MEDICARE

## 2022-09-06 VITALS
HEART RATE: 66 BPM | SYSTOLIC BLOOD PRESSURE: 150 MMHG | BODY MASS INDEX: 29.17 KG/M2 | DIASTOLIC BLOOD PRESSURE: 80 MMHG | WEIGHT: 158.5 LBS | HEIGHT: 62 IN | OXYGEN SATURATION: 97 %

## 2022-09-06 DIAGNOSIS — F03.90 DEMENTIA WITHOUT BEHAVIORAL DISTURBANCE, UNSPECIFIED DEMENTIA TYPE: ICD-10-CM

## 2022-09-06 DIAGNOSIS — N32.81 OVERACTIVE BLADDER: ICD-10-CM

## 2022-09-06 DIAGNOSIS — I48.0 PAROXYSMAL A-FIB: ICD-10-CM

## 2022-09-06 DIAGNOSIS — I35.1 NONRHEUMATIC AORTIC VALVE INSUFFICIENCY: ICD-10-CM

## 2022-09-06 DIAGNOSIS — E05.90 HYPERTHYROIDISM: ICD-10-CM

## 2022-09-06 DIAGNOSIS — R35.89 POLYURIA: ICD-10-CM

## 2022-09-06 DIAGNOSIS — I10 ESSENTIAL HYPERTENSION: ICD-10-CM

## 2022-09-06 DIAGNOSIS — Z00.00 ENCOUNTER FOR PREVENTIVE HEALTH EXAMINATION: Primary | ICD-10-CM

## 2022-09-06 PROBLEM — L29.9 CHRONIC PRURITUS: Status: ACTIVE | Noted: 2022-09-06

## 2022-09-06 PROCEDURE — G0439 PPPS, SUBSEQ VISIT: HCPCS | Mod: ,,, | Performed by: NURSE PRACTITIONER

## 2022-09-06 PROCEDURE — 99999 PR PBB SHADOW E&M-EST. PATIENT-LVL IV: ICD-10-PCS | Mod: PBBFAC,,, | Performed by: NURSE PRACTITIONER

## 2022-09-06 PROCEDURE — G0439 PR MEDICARE ANNUAL WELLNESS SUBSEQUENT VISIT: ICD-10-PCS | Mod: ,,, | Performed by: NURSE PRACTITIONER

## 2022-09-06 PROCEDURE — 99999 PR PBB SHADOW E&M-EST. PATIENT-LVL IV: CPT | Mod: PBBFAC,,, | Performed by: NURSE PRACTITIONER

## 2022-09-06 PROCEDURE — 99214 OFFICE O/P EST MOD 30 MIN: CPT | Mod: PBBFAC,PO | Performed by: NURSE PRACTITIONER

## 2022-09-06 NOTE — Clinical Note
I saw your patient today for medicare wellness. She has quit taking HCTZ due to chronic itching she perceives from medication. Chart review - she has been itching since 2019 - suspected 2/2 hyperthyroid. BP elevated today. Still with vaginal itching - she was treated in early August with fluconazole. She has appt with you tomorrow to discuss above. Thank you

## 2022-09-06 NOTE — PATIENT INSTRUCTIONS
Counseling and Referral of Other Preventative  (Italic type indicates deductible and co-insurance are waived)    Patient Name: Flor Erwin  Today's Date: 9/6/2022    Health Maintenance       Date Due Completion Date    Influenza Vaccine (1) 09/14/2022 (Originally 9/1/2022) 10/5/2021    Pneumococcal Vaccines (Age 65+) (2 - PCV) 09/22/2022 (Originally 1/31/2019) 1/31/2018    Mammogram 07/18/2023 7/18/2022    DEXA Scan 04/22/2024 4/22/2021    Lipid Panel 01/08/2025 1/8/2020    TETANUS VACCINE 11/19/2025 11/19/2015    Colorectal Cancer Screening 08/29/2027 8/29/2022        No orders of the defined types were placed in this encounter.    The following information is provided to all patients.  This information is to help you find resources for any of the problems found today that may be affecting your health:                Living healthy guide: www.[facility].[place].[facility]      Understanding Diabetes: www.diabetes.org      Eating healthy: www.cdc.gov/healthyweight      [facility] home safety checklist: www.cdc.gov/steadi/patient.html      Agency on Aging: www.goea.[place].gov      Alcoholics anonymous (AA): www.aa.org      Physical Activity: www.jhony.nih.gov/hd6egej      Tobacco use: www.quitwithusla.org

## 2022-09-07 ENCOUNTER — OFFICE VISIT (OUTPATIENT)
Dept: FAMILY MEDICINE | Facility: CLINIC | Age: 72
End: 2022-09-07
Payer: MEDICARE

## 2022-09-07 VITALS
OXYGEN SATURATION: 100 % | SYSTOLIC BLOOD PRESSURE: 132 MMHG | DIASTOLIC BLOOD PRESSURE: 84 MMHG | BODY MASS INDEX: 28.84 KG/M2 | HEART RATE: 71 BPM | WEIGHT: 156.75 LBS | HEIGHT: 62 IN

## 2022-09-07 DIAGNOSIS — I48.0 PAROXYSMAL A-FIB: Primary | ICD-10-CM

## 2022-09-07 DIAGNOSIS — R73.9 HYPERGLYCEMIA: ICD-10-CM

## 2022-09-07 DIAGNOSIS — L29.9 ITCHING: ICD-10-CM

## 2022-09-07 DIAGNOSIS — E05.90 HYPERTHYROIDISM: ICD-10-CM

## 2022-09-07 DIAGNOSIS — I10 ESSENTIAL HYPERTENSION: ICD-10-CM

## 2022-09-07 PROCEDURE — 99999 PR PBB SHADOW E&M-EST. PATIENT-LVL III: CPT | Mod: PBBFAC,,, | Performed by: INTERNAL MEDICINE

## 2022-09-07 PROCEDURE — 99214 OFFICE O/P EST MOD 30 MIN: CPT | Mod: S$PBB,,, | Performed by: INTERNAL MEDICINE

## 2022-09-07 PROCEDURE — 99213 OFFICE O/P EST LOW 20 MIN: CPT | Mod: PBBFAC,PO | Performed by: INTERNAL MEDICINE

## 2022-09-07 PROCEDURE — 99214 PR OFFICE/OUTPT VISIT, EST, LEVL IV, 30-39 MIN: ICD-10-PCS | Mod: S$PBB,,, | Performed by: INTERNAL MEDICINE

## 2022-09-07 PROCEDURE — 99999 PR PBB SHADOW E&M-EST. PATIENT-LVL III: ICD-10-PCS | Mod: PBBFAC,,, | Performed by: INTERNAL MEDICINE

## 2022-09-07 RX ORDER — TRIAMCINOLONE ACETONIDE 1 MG/G
OINTMENT TOPICAL 2 TIMES DAILY PRN
Qty: 30 G | Refills: 2 | Status: SHIPPED | OUTPATIENT
Start: 2022-09-07 | End: 2023-11-17 | Stop reason: SDUPTHER

## 2022-09-07 RX ORDER — CETIRIZINE HYDROCHLORIDE 10 MG/1
10 TABLET ORAL DAILY
Qty: 30 TABLET | Refills: 2 | Status: SHIPPED | OUTPATIENT
Start: 2022-09-07 | End: 2023-11-17 | Stop reason: SDUPTHER

## 2022-09-07 RX ORDER — PRAMOXINE HYDROCHLORIDE 10 MG/ML
1 LOTION TOPICAL 2 TIMES DAILY
Qty: 222 ML | Refills: 2 | Status: SHIPPED | OUTPATIENT
Start: 2022-09-07

## 2022-09-07 NOTE — PROGRESS NOTES
Subjective:       Patient ID: Flor Erwin is a 72 y.o. female.    Chief Complaint: Hypertension      HPI  Flor Erwin is a 72 y.o. female with chronic conditions of atrial fibrillation, HTN, thyroid disease, and dry eyes who presents today for routine health maintenance.    Reports has been having itchy skin for a long time.  She preventive health examination and was advised to let us know about her symptoms.  Has been having itching for years and does not associate the symptoms to any of her medication changes.  Uses Aveeno soap and takes Benadryl liquid (small dose?) with little help.  Triamcinolone cream seems to help.    As she was evaluated by Ophthalmology, Dr. Perkins and she was found to be a candidate for Tapeza and is waiting on his medication his arrival to receive it.    She follows with endocrinologist outside [facility], Dr. Redmond, with no recent changes to her medications.    Has no toxic habits.  Exercises running stationary bike for 30 minutes and walking for 30 minutes with no exertional symptoms.    Health Maintenance:  Health Maintenance   Topic Date Due    Mammogram  07/18/2023    DEXA Scan  04/22/2024    Lipid Panel  01/08/2025    TETANUS VACCINE  11/19/2025    Hepatitis C Screening  Discontinued       Review of Systems   Constitutional: Negative.  Negative for fever and unexpected weight change.   HENT: Negative.     Eyes:  Negative for visual disturbance.   Respiratory: Negative.     Cardiovascular: Negative.    Gastrointestinal: Negative.    Genitourinary:  Negative for difficulty urinating.   Musculoskeletal: Negative.    Integumentary:  Negative for rash.        Itching allover Negative.   Neurological: Negative.    Hematological: Negative.    Psychiatric/Behavioral: Negative.      Past Medical History:   Diagnosis Date    Hypertension        Past Surgical History:   Procedure Laterality Date    ARTHROSCOPIC REPAIR OF ROTATOR CUFF OF SHOULDER Left 5/28/2021    Procedure:  REPAIR, ROTATOR CUFF, ARTHROSCOPIC, AC resection;  Surgeon: Michael Short Jr., MD;  Location: [facility] OR;  Service: Orthopedics;  Laterality: Left;  need opus system  [facility] notified 5/11/21 CC    ARTHROSCOPY OF SHOULDER WITH DECOMPRESSION OF SUBACROMIAL SPACE  5/28/2021    Procedure: ARTHROSCOPY, SHOULDER, WITH SUBACROMIAL SPACE DECOMPRESSION;  Surgeon: Michael Short Jr., MD;  Location: [facility] OR;  Service: Orthopedics;;    CHOLECYSTECTOMY      COLONOSCOPY N/A 8/29/2022    Procedure: COLONOSCOPY Suprep;  Surgeon: Prince Cummings MD;  Location: [facility] ENDO;  Service: Endoscopy;  Laterality: N/A;    HYSTERECTOMY      partial in her 40's    OOPHORECTOMY         Family History   Problem Relation Age of Onset    Hyperlipidemia Mother     Heart attack Mother     Coronary artery disease Mother     Diabetes Father     Diabetes Mellitus Father     Peripheral vascular disease Father     No Known Problems Sister     Heart disease Sister     Hypertension Sister     Hypertension Brother     Peripheral vascular disease Brother     No Known Problems Daughter     No Known Problems Son        Social History     Socioeconomic History    Marital status:    Tobacco Use    Smoking status: Never    Smokeless tobacco: Never   Substance and Sexual Activity    Alcohol use: No    Drug use: No    Sexual activity: Not Currently     Social Determinants of Health     Financial Resource Strain: Low Risk     Difficulty of Paying Living Expenses: Not very hard   Food Insecurity: No Food Insecurity    Worried About Running Out of Food in the Last Year: Never true    Ran Out of Food in the Last Year: Never true   Transportation Needs: No Transportation Needs    Lack of Transportation (Medical): No    Lack of Transportation (Non-Medical): No   Physical Activity: Sufficiently Active    Days of Exercise per Week: 7 days    Minutes of Exercise per Session: 30 min   Stress: No Stress Concern Present    Feeling of Stress : Not at all   Social  Connections: Moderately Isolated    Frequency of Communication with Friends and Family: More than three times a week    Frequency of Social Gatherings with Friends and Family: More than three times a week    Attends Yarsani Services: More than 4 times per year    Active Member of Clubs or Organizations: No    Attends Club or Organization Meetings: Never    Marital Status:    Housing Stability: Low Risk     Unable to Pay for Housing in the Last Year: No    Number of Places Lived in the Last Year: 1    Unstable Housing in the Last Year: No       Current Outpatient Medications   Medication Sig Dispense Refill    amlodipine-benazepril 10-20mg (LOTREL) 10-20 mg per capsule Take 1 capsule by mouth once daily. 90 capsule 3    cycloSPORINE (RESTASIS) 0.05 % ophthalmic emulsion Place 1 drop into both eyes 2 (two) times daily.      donepeziL (ARICEPT) 5 MG tablet Take 1 tablet (5 mg total) by mouth every evening. 90 tablet 1    ELIQUIS 5 mg Tab TAKE 1 TABLET TWICE A  tablet 3    EUTHYROX 112 mcg tablet Take 112 mcg by mouth once daily.      oxybutynin (DITROPAN-XL) 5 MG TR24 Take 2 tablets (10 mg total) by mouth once daily. 180 tablet 3    cetirizine (ZYRTEC) 10 MG tablet Take 1 tablet (10 mg total) by mouth once daily. 30 tablet 2    pramoxine (SARNA SENSITIVE) 1 % Lotn Apply 1 application topically 2 (two) times a day. 222 mL 2    propylene glycoL (SYSTANE BALANCE) 0.6 % Drop Apply 2 drops to eye 2 (two) times daily. (Patient not taking: Reported on 9/7/2022) 3 Bottle 3    triamcinolone acetonide 0.1% (KENALOG) 0.1 % ointment Apply topically 2 (two) times daily as needed (itching). 30 g 2     No current facility-administered medications for this visit.     Facility-Administered Medications Ordered in Other Visits   Medication Dose Route Frequency Provider Last Rate Last Admin    simethicone 40 mg/0.6 mL drops    PRN Prince Cummings MD   40 mg at 08/29/22 0938       Review of patient's allergies indicates:    Allergen Reactions    Penicillins Hives    Hydrochlorothiazide Itching         Objective:       Last 3 sets of Vitals    Vitals - 1 value per visit 9/6/2022 9/7/2022 9/7/2022   SYSTOLIC 150 - 132   DIASTOLIC 80 - 84   Pulse 66 - 71   Temp - - -   Resp - - -   SPO2 97 - 100   Weight (lb) 158.51 - 156.75   Weight (kg) 71.9 - 71.1   Height 62 - 62   BMI (Calculated) 29 - 28.7   VISIT REPORT - - -   Pain Score  - 0 -   Some recent data might be hidden   Physical Exam  Vitals and nursing note reviewed.   Constitutional:       General: She is not in acute distress.     Appearance: Normal appearance. She is not ill-appearing.   HENT:      Head: Normocephalic and atraumatic.   Eyes:      General: No scleral icterus.        Right eye: No discharge.         Left eye: No discharge.      Extraocular Movements: Extraocular movements intact.      Conjunctiva/sclera: Conjunctivae normal.   Cardiovascular:      Rate and Rhythm: Normal rate and regular rhythm.      Heart sounds: Normal heart sounds. No murmur heard.  Pulmonary:      Effort: Pulmonary effort is normal. No respiratory distress.      Breath sounds: Normal breath sounds. No wheezing, rhonchi or rales.   Musculoskeletal:      Cervical back: Normal range of motion.      Right lower leg: No edema.      Left lower leg: No edema.   Skin:     General: Skin is warm.      Findings: No rash.      Comments: Skin dry    Neurological:      Mental Status: She is alert and oriented to person, place, and time.   Psychiatric:         Mood and Affect: Mood normal.         Behavior: Behavior normal. Behavior is cooperative.         Cognition and Memory: Cognition normal. Memory is impaired.         CBC:  Recent Labs   Lab 04/11/21  0242 04/12/21  0028 10/15/21  0755   WBC 7.63 9.78 4.53   RBC 4.94 5.09 4.50   Hemoglobin 14.7 15.1 13.0   Hematocrit 44.4 44.7 39.3   Platelets 288 302 269   MCV 90 88 87   MCH 29.8 29.7 28.9   MCHC 33.1 33.8 33.1     CMP:  Recent Labs   Lab 04/12/21 0028  04/22/21  0955 05/25/21  0849 10/15/21  0755   Glucose 123 H 85   < > 92   Calcium 9.1 9.0   < > 9.0   Albumin 4.1 3.8  --  3.9   Total Protein 7.3 6.8  --  7.0   Sodium 142 144   < > 143   Potassium 4.3 3.4 L   < > 3.4 L   CO2 29 26   < > 27   Chloride 104 109   < > 109   BUN 12 12   < > 8   Creatinine 0.9 0.8   < > 0.8   Alkaline Phosphatase 101 148 H  --  110   ALT 13 54 H  --  14   AST 14 17  --  14   Total Bilirubin 0.4 0.4  --  0.5    < > = values in this interval not displayed.     URINALYSIS:  Recent Labs   Lab 08/12/22  0920   Color, UA Yellow   Specific Gravity, UA 1.020   pH, UA 6.0   Protein, UA Negative   Nitrite, UA Negative   Leukocytes, UA Negative   Urobilinogen, UA Negative      LIPIDS:  Recent Labs   Lab 12/25/19  0909 01/08/20  0946 04/22/21  0955 10/15/21  0755   TSH <0.010 L  --  1.780 0.466   HDL  --  43  --   --    Cholesterol  --  169  --   --    Triglycerides  --  94  --   --    LDL Cholesterol  --  107.2  --   --    HDL/Cholesterol Ratio  --  25.4  --   --    Non-HDL Cholesterol  --  126  --   --    Total Cholesterol/HDL Ratio  --  3.9  --   --      TSH:  Recent Labs   Lab 12/25/19  0909 04/22/21  0955 10/15/21  0755   TSH <0.010 L 1.780 0.466       A1C:  Recent Labs   Lab 04/22/21  0955 06/09/21  0000   Hemoglobin A1C 5.2 5.1       Imaging:  Mammo Digital Screening Bilat w/ Jermain  Narrative: Result:  Mammo Digital Screening Bilat w/ Jermain    History:  Patient is 72 y.o. and is seen for a screening mammogram.    Films Compared:  Compared to: 07/16/2021 Mammo Digital Screening Bilat w/ Jermain (No Change),   06/10/2020 Mammo Digital Screening Bilat (No Change), 06/06/2019 Mammo   Digital Screening Bilat (No Change), and 06/05/2018 Mammo Digital   Screening Bilat with CAD     Findings:   This procedure was performed using tomosynthesis.   Computer-aided detection was utilized in the interpretation of this   examination.    The breasts are heterogeneously dense, which may obscure small masses.    There is no evidence of suspicious masses, microcalcifications or   architectural distortion.  Impression:    No mammographic evidence of malignancy.    BI-RADS Category 1: Negative    Recommendation:  Routine screening mammogram in 1 year is recommended.    Your estimated lifetime risk of breast cancer (to age 85) based on   Tyrer-Cuzick risk assessment model is 2.06 %.  According to the American   Cancer Society, patients with a lifetime breast cancer risk of 20% or   higher might benefit from supplemental screening tests. ??       Assessment:       1. Paroxysmal A-fib    2. Hyperthyroidism    3. Essential hypertension    4. Hyperglycemia    5. Itching              Plan:       Flor was seen today for hypertension.    Diagnoses and all orders for this visit:    Paroxysmal A-fib  -     CBC Auto Differential; Future  -     Lipid Panel; Future    Hyperthyroidism  -     TSH; Future  -     T4, Free; Future  - follow-up with endocrinology    Essential hypertension  -     Comprehensive Metabolic Panel; Future  -     CBC Auto Differential; Future  -     Lipid Panel; Future  - controlled    Hyperglycemia  -     Hemoglobin A1C; Future  - stable    Itching  -     CBC Auto Differential; Future  -     pramoxine (SARNA SENSITIVE) 1 % Lotn; Apply 1 application topically 2 (two) times a day.  -     triamcinolone acetonide 0.1% (KENALOG) 0.1 % ointment; Apply topically 2 (two) times daily as needed (itching).  -     cetirizine (ZYRTEC) 10 MG tablet; Take 1 tablet (10 mg total) by mouth once daily.  - doubt that is medication but is persistent could consider changing ACE-inhibitor or allergies evaluation.    There are no preventive care reminders to display for this patient.     Return to clinic in 3 months.    Kamryn New MD  [facility]  Disclaimer:  This note has been generated using voice-recognition software. There may be grammatical or spelling errors that have been missed during proof-reading

## 2022-09-19 ENCOUNTER — OFFICE VISIT (OUTPATIENT)
Dept: ORTHOPEDICS | Facility: CLINIC | Age: 72
End: 2022-09-19
Payer: MEDICARE

## 2022-09-19 VITALS — BODY MASS INDEX: 28.89 KG/M2 | HEIGHT: 62 IN | WEIGHT: 157 LBS

## 2022-09-19 DIAGNOSIS — M19.049 ARTHRITIS OF FINGER: ICD-10-CM

## 2022-09-19 PROCEDURE — 99213 OFFICE O/P EST LOW 20 MIN: CPT | Mod: S$PBB,25,, | Performed by: ORTHOPAEDIC SURGERY

## 2022-09-19 PROCEDURE — 99213 PR OFFICE/OUTPT VISIT, EST, LEVL III, 20-29 MIN: ICD-10-PCS | Mod: S$PBB,25,, | Performed by: ORTHOPAEDIC SURGERY

## 2022-09-19 PROCEDURE — 99999 PR PBB SHADOW E&M-EST. PATIENT-LVL III: CPT | Mod: PBBFAC,,, | Performed by: ORTHOPAEDIC SURGERY

## 2022-09-19 PROCEDURE — 99999 PR PBB SHADOW E&M-EST. PATIENT-LVL III: ICD-10-PCS | Mod: PBBFAC,,, | Performed by: ORTHOPAEDIC SURGERY

## 2022-09-19 PROCEDURE — 99213 OFFICE O/P EST LOW 20 MIN: CPT | Mod: PBBFAC,PN,25 | Performed by: ORTHOPAEDIC SURGERY

## 2022-09-19 PROCEDURE — 20600 DRAIN/INJ JOINT/BURSA W/O US: CPT | Mod: S$PBB,RT,, | Performed by: ORTHOPAEDIC SURGERY

## 2022-09-19 PROCEDURE — 20600 PR DRAIN/INJECT SMALL JOINT/BURSA: ICD-10-PCS | Mod: S$PBB,RT,, | Performed by: ORTHOPAEDIC SURGERY

## 2022-09-19 PROCEDURE — 20600 DRAIN/INJ JOINT/BURSA W/O US: CPT | Mod: PBBFAC,PN | Performed by: ORTHOPAEDIC SURGERY

## 2022-09-19 RX ORDER — TRIAMCINOLONE ACETONIDE 40 MG/ML
20 INJECTION, SUSPENSION INTRA-ARTICULAR; INTRAMUSCULAR
Status: COMPLETED | OUTPATIENT
Start: 2022-09-19 | End: 2022-09-19

## 2022-09-19 RX ADMIN — TRIAMCINOLONE ACETONIDE 20 MG: 40 INJECTION, SUSPENSION INTRA-ARTICULAR; INTRAMUSCULAR at 03:09

## 2022-09-19 NOTE — PROGRESS NOTES
Subjective:      Patient ID: Flor Erwin is a 72 y.o. female.  Chief Complaint: Consult of the Right Hand      HPI  Flor Erwin is a  72 y.o. female presenting today for follow up of right hand symptoms including finger arthritis.  She reports that she is having increased pain in the right middle finger recently symptoms worse with gripping   No triggering reported no recent trauma.    Review of patient's allergies indicates:   Allergen Reactions    Penicillins Hives    Hydrochlorothiazide Itching         Current Outpatient Medications   Medication Sig Dispense Refill    amlodipine-benazepril 10-20mg (LOTREL) 10-20 mg per capsule Take 1 capsule by mouth once daily. 90 capsule 3    cetirizine (ZYRTEC) 10 MG tablet Take 1 tablet (10 mg total) by mouth once daily. 30 tablet 2    cycloSPORINE (RESTASIS) 0.05 % ophthalmic emulsion Place 1 drop into both eyes 2 (two) times daily.      ELIQUIS 5 mg Tab TAKE 1 TABLET TWICE A  tablet 3    EUTHYROX 112 mcg tablet Take 112 mcg by mouth once daily.      oxybutynin (DITROPAN-XL) 5 MG TR24 Take 2 tablets (10 mg total) by mouth once daily. 180 tablet 3    pramoxine (SARNA SENSITIVE) 1 % Lotn Apply 1 application topically 2 (two) times a day. 222 mL 2    propylene glycoL (SYSTANE BALANCE) 0.6 % Drop Apply 2 drops to eye 2 (two) times daily. 3 Bottle 3    triamcinolone acetonide 0.1% (KENALOG) 0.1 % ointment Apply topically 2 (two) times daily as needed (itching). 30 g 2    donepeziL (ARICEPT) 5 MG tablet Take 1 tablet (5 mg total) by mouth every evening. 90 tablet 1     No current facility-administered medications for this visit.     Facility-Administered Medications Ordered in Other Visits   Medication Dose Route Frequency Provider Last Rate Last Admin    simethicone 40 mg/0.6 mL drops    PRN Prince Cummings MD   40 mg at 08/29/22 0938       Past Medical History:   Diagnosis Date    Hypertension        Past Surgical History:   Procedure Laterality Date     "ARTHROSCOPIC REPAIR OF ROTATOR CUFF OF SHOULDER Left 5/28/2021    Procedure: REPAIR, ROTATOR CUFF, ARTHROSCOPIC, AC resection;  Surgeon: Michael Short Jr., MD;  Location: [facility] OR;  Service: Orthopedics;  Laterality: Left;  need opus system  [facility]olic notified 5/11/21 CC    ARTHROSCOPY OF SHOULDER WITH DECOMPRESSION OF SUBACROMIAL SPACE  5/28/2021    Procedure: ARTHROSCOPY, SHOULDER, WITH SUBACROMIAL SPACE DECOMPRESSION;  Surgeon: Michael Short Jr., MD;  Location: [facility] OR;  Service: Orthopedics;;    CHOLECYSTECTOMY      COLONOSCOPY N/A 8/29/2022    Procedure: COLONOSCOPY Suprep;  Surgeon: Prince Cummings MD;  Location: [facility] ENDO;  Service: Endoscopy;  Laterality: N/A;    HYSTERECTOMY      partial in her 40's    OOPHORECTOMY         OBJECTIVE:   PHYSICAL EXAM:  Height: 5' 2" (157.5 cm) Weight: 71.2 kg (157 lb)  Vitals:    09/19/22 1515   Weight: 71.2 kg (157 lb)   Height: 5' 2" (1.575 m)   PainSc:   8     Ortho/SPM Exam  Examination right hand there is swelling tenderness PIP joint right middle finger  Range of motion is limited due to stiffness and some pain   No triggering   Sensation intact       RADIOGRAPHS:  Previous x-rays show degenerative changes of the PIP joint middle finger  Comments: I have personally reviewed the imaging and I agree with the above radiologist's report.    ASSESSMENT/PLAN:     IMPRESSION:  PIP arthritis right middle finger    PLAN:  Patient would like injection today   After pause for time-out identified the right middle finger injected PIP joint with combination Kenalog 20 mg 0.5 cc xylocaine sterile technique  She tolerated the procedure well without complication  Gentle range of motion prevent stiffness    FOLLOW UP:  2-3 months    Disclaimer: This note has been generated using voice-recognition software. There may be typographical errors that have been missed during proof-reading.     "

## 2022-09-23 ENCOUNTER — TELEPHONE (OUTPATIENT)
Dept: FAMILY MEDICINE | Facility: CLINIC | Age: 72
End: 2022-09-23
Payer: MEDICARE

## 2022-09-23 NOTE — TELEPHONE ENCOUNTER
----- Message from Semaj Johnson sent at 9/23/2022  2:54 PM CDT -----  Type:  Needs Medical Advice    Who Called: Tre Masterson eyelid pt consult   Would the patient rather a call back or a response via [facility]? call  Best Call Back Number: [phone]  Additional Information: starting pt on tepezza

## 2022-09-23 NOTE — TELEPHONE ENCOUNTER
Tepezza starting patient on medication soon patient did very good on eyelid consult  . Dr.Adham Masterson just called to update  on patient .

## 2022-09-29 ENCOUNTER — TELEPHONE (OUTPATIENT)
Dept: FAMILY MEDICINE | Facility: CLINIC | Age: 72
End: 2022-09-29
Payer: MEDICARE

## 2022-09-29 NOTE — TELEPHONE ENCOUNTER
Spoke to patient who has been having Vaginal itching  patient requested an appointment with a female provider . Appointment has been scheduled

## 2022-09-29 NOTE — TELEPHONE ENCOUNTER
----- Message from Gertrude Cline sent at 9/29/2022  9:56 AM CDT -----  Contact: Pt  .Type: Needs Medical Advice    Who Called: Pt  Symptoms (please be specific) Naval hurting, Painful, Vaginal Dryness  How long has patient had these symptoms: 2 weeks  Would the patient rather a call back or a response via [facility]? Call  Best Call Back Number: [phone]  Additional Information:   Pt wanted to be seen today.  Please call back pt before 5pm.

## 2022-10-05 ENCOUNTER — OFFICE VISIT (OUTPATIENT)
Dept: FAMILY MEDICINE | Facility: CLINIC | Age: 72
End: 2022-10-05
Payer: MEDICARE

## 2022-10-05 VITALS
OXYGEN SATURATION: 99 % | HEART RATE: 68 BPM | BODY MASS INDEX: 28.32 KG/M2 | HEIGHT: 62 IN | SYSTOLIC BLOOD PRESSURE: 130 MMHG | DIASTOLIC BLOOD PRESSURE: 82 MMHG | WEIGHT: 153.88 LBS

## 2022-10-05 DIAGNOSIS — N95.2 ATROPHIC VAGINITIS: ICD-10-CM

## 2022-10-05 DIAGNOSIS — N89.8 VAGINAL ITCHING: Primary | ICD-10-CM

## 2022-10-05 PROCEDURE — 99999 PR PBB SHADOW E&M-EST. PATIENT-LVL IV: CPT | Mod: PBBFAC,,, | Performed by: FAMILY MEDICINE

## 2022-10-05 PROCEDURE — 99213 OFFICE O/P EST LOW 20 MIN: CPT | Mod: S$PBB,,, | Performed by: FAMILY MEDICINE

## 2022-10-05 PROCEDURE — 99214 OFFICE O/P EST MOD 30 MIN: CPT | Mod: PBBFAC,PO | Performed by: FAMILY MEDICINE

## 2022-10-05 PROCEDURE — 99213 PR OFFICE/OUTPT VISIT, EST, LEVL III, 20-29 MIN: ICD-10-PCS | Mod: S$PBB,,, | Performed by: FAMILY MEDICINE

## 2022-10-05 PROCEDURE — 99999 PR PBB SHADOW E&M-EST. PATIENT-LVL IV: ICD-10-PCS | Mod: PBBFAC,,, | Performed by: FAMILY MEDICINE

## 2022-10-05 RX ORDER — FLUCONAZOLE 150 MG/1
150 TABLET ORAL DAILY
Qty: 1 TABLET | Refills: 0 | Status: SHIPPED | OUTPATIENT
Start: 2022-10-05 | End: 2022-10-06

## 2022-10-05 NOTE — PROGRESS NOTES
(Portions of this note were dictated using voice recognition software and may contain dictation related errors in spelling/grammar/syntax not found on text review)    CC:   Chief Complaint   Patient presents with    Vaginal Itching       HPI: 72 y.o. female, pt of Dr New, presented with vaginal itching.  She is new to me.  She has medical history significant for dementia without behavioral disturbance, hypertension, paroxysmal Afib. She reports having vaginal itching for the past several months, was treated for yeast infection by PCP with diflucan and also used multiple topical creams including nystatin, triamcinolone, cortisone, anti itch cream without any relief. She reports itching is all over, on outer vagina as well as inside and on vulva, denies having any abnormal vaginal discharge, sexually active with  only on non frequent basis. On further questioning she reveals having vaginal dryness and discomfort, she is postmenopausal. She does not follow up with gynecologist. She denies having cough, SOB, chest pain, N/V, changes in bowel habits, urine problems including burning and dysurea.     Past Medical History:   Diagnosis Date    Hypertension        Past Surgical History:   Procedure Laterality Date    ARTHROSCOPIC REPAIR OF ROTATOR CUFF OF SHOULDER Left 5/28/2021    Procedure: REPAIR, ROTATOR CUFF, ARTHROSCOPIC, AC resection;  Surgeon: Michael Short Jr., MD;  Location: [facility] OR;  Service: Orthopedics;  Laterality: Left;  need opus system  [employer] notified 5/11/21 CC    ARTHROSCOPY OF SHOULDER WITH DECOMPRESSION OF SUBACROMIAL SPACE  5/28/2021    Procedure: ARTHROSCOPY, SHOULDER, WITH SUBACROMIAL SPACE DECOMPRESSION;  Surgeon: Michael Short Jr., MD;  Location: [facility] OR;  Service: Orthopedics;;    CHOLECYSTECTOMY      COLONOSCOPY N/A 8/29/2022    Procedure: COLONOSCOPY Suprep;  Surgeon: Prince Cummings MD;  Location: [facility] ENDO;  Service: Endoscopy;  Laterality: N/A;    HYSTERECTOMY      partial in  her 40's    OOPHORECTOMY         Family History   Problem Relation Age of Onset    Hyperlipidemia Mother     Heart attack Mother     Coronary artery disease Mother     Diabetes Father     Diabetes Mellitus Father     Peripheral vascular disease Father     No Known Problems Sister     Heart disease Sister     Hypertension Sister     Hypertension Brother     Peripheral vascular disease Brother     No Known Problems Daughter     No Known Problems Son        Social History     Tobacco Use    Smoking status: Never    Smokeless tobacco: Never   Substance Use Topics    Alcohol use: No    Drug use: No       Lab Results   Component Value Date    WBC 4.53 10/15/2021    HGB 13.0 10/15/2021    HCT 39.3 10/15/2021    MCV 87 10/15/2021     10/15/2021    CHOL 169 01/08/2020    TRIG 94 01/08/2020    HDL 43 01/08/2020    ALT 14 10/15/2021    AST 14 10/15/2021    BILITOT 0.5 10/15/2021    ALKPHOS 110 10/15/2021     10/15/2021    K 3.4 (L) 10/15/2021     10/15/2021    CREATININE 0.8 10/15/2021    ESTGFRAFRICA >60 10/15/2021    EGFRNONAA >60 10/15/2021    CALCIUM 9.0 10/15/2021    ALBUMIN 3.9 10/15/2021    BUN 8 10/15/2021    CO2 27 10/15/2021    TSH 0.466 10/15/2021    INR 1.0 12/25/2019    HGBA1C 5.1 06/09/2021    LDLCALC 107.2 01/08/2020    GLU 92 10/15/2021             Vital signs reviewed  PE:   APPEARANCE: In no acute distress.    HEAD: Normocephalic, atraumatic.  EYES: EOMI.  Conjunctivae noninjected.  NECK: Supple with no cervical lymphadenopathy.    CHEST:  Lungs clear to auscultation with no wheezes or crackles.  CARDIOVASCULAR: Normal S1, S2. No rubs, murmurs, or gallops.  ABDOMEN: Bowel sounds normal. Not distended. Soft. No tenderness or masses. No organomegaly.  EXTREMITIES: No edema, cyanosis, or clubbing.  VAGINAL: erythematous vulva and vagina, thinning noted, no vaginal discharge seen      Review of Systems   Constitutional:  Negative for chills, fatigue and fever.   HENT: Negative.     Respiratory:   Negative for cough, shortness of breath and wheezing.    Cardiovascular:  Negative for chest pain, palpitations and leg swelling.   Gastrointestinal:  Negative for abdominal pain, change in bowel habit, constipation, diarrhea, nausea, vomiting and change in bowel habit.   Genitourinary:  Positive for vaginal dryness. Negative for dyspareunia, genital sores, menstrual irregularity, menstrual problem, vaginal bleeding, vaginal discharge and vaginal pain.   Musculoskeletal: Negative.    Neurological: Negative.    Psychiatric/Behavioral: Negative.     All other systems reviewed and are negative.    IMPRESSION  1. Vaginal itching    2. Atrophic vaginitis            PLAN      1. Atrophic vaginitis  - Ambulatory referral/consult to Gynecology; Future    Advised to use vaginal lubricants and moisturizers to avoid discomfort    Might need further work up to look into lichen sclerosis, referral sent to gynae      2. Vaginal itching  - Ambulatory referral/consult to Gynecology; Future    - fluconazole (DIFLUCAN) 150 MG Tab; Take 1 tablet (150 mg total) by mouth once daily. for 1 day  Dispense: 1 tablet; Refill: 0         Age/demographic appropriate health maintenance:    Health Maintenance Due   Topic Date Due    Pneumococcal Vaccines (Age 65+) (2 - PCV) 01/31/2019    COVID-19 Vaccine (5 - Booster) 06/30/2022         F/U with PCP      Manjeet Milian   10/5/2022

## 2022-10-06 ENCOUNTER — OFFICE VISIT (OUTPATIENT)
Dept: OBSTETRICS AND GYNECOLOGY | Facility: CLINIC | Age: 72
End: 2022-10-06
Payer: MEDICARE

## 2022-10-06 VITALS — DIASTOLIC BLOOD PRESSURE: 58 MMHG | SYSTOLIC BLOOD PRESSURE: 115 MMHG | BODY MASS INDEX: 28.63 KG/M2 | WEIGHT: 156.5 LBS

## 2022-10-06 DIAGNOSIS — N89.8 VAGINAL ITCHING: ICD-10-CM

## 2022-10-06 DIAGNOSIS — N95.2 ATROPHIC VAGINITIS: ICD-10-CM

## 2022-10-06 PROCEDURE — 99999 PR PBB SHADOW E&M-EST. PATIENT-LVL III: CPT | Mod: PBBFAC,,, | Performed by: STUDENT IN AN ORGANIZED HEALTH CARE EDUCATION/TRAINING PROGRAM

## 2022-10-06 PROCEDURE — 99203 OFFICE O/P NEW LOW 30 MIN: CPT | Mod: S$PBB,,, | Performed by: STUDENT IN AN ORGANIZED HEALTH CARE EDUCATION/TRAINING PROGRAM

## 2022-10-06 PROCEDURE — 99203 PR OFFICE/OUTPT VISIT, NEW, LEVL III, 30-44 MIN: ICD-10-PCS | Mod: S$PBB,,, | Performed by: STUDENT IN AN ORGANIZED HEALTH CARE EDUCATION/TRAINING PROGRAM

## 2022-10-06 PROCEDURE — 99999 PR PBB SHADOW E&M-EST. PATIENT-LVL III: ICD-10-PCS | Mod: PBBFAC,,, | Performed by: STUDENT IN AN ORGANIZED HEALTH CARE EDUCATION/TRAINING PROGRAM

## 2022-10-06 PROCEDURE — 99213 OFFICE O/P EST LOW 20 MIN: CPT | Mod: PBBFAC,PO | Performed by: STUDENT IN AN ORGANIZED HEALTH CARE EDUCATION/TRAINING PROGRAM

## 2022-10-06 RX ORDER — CLOBETASOL PROPIONATE 0.5 MG/G
OINTMENT TOPICAL 2 TIMES DAILY
Qty: 30 G | Refills: 2 | Status: SHIPPED | OUTPATIENT
Start: 2022-10-06 | End: 2022-10-06

## 2022-10-06 RX ORDER — CLOBETASOL PROPIONATE 0.5 MG/G
OINTMENT TOPICAL 2 TIMES DAILY
Qty: 30 G | Refills: 2 | Status: SHIPPED | OUTPATIENT
Start: 2022-10-06 | End: 2023-02-15

## 2022-10-06 NOTE — PROGRESS NOTES
"History & Physical  Gynecology      SUBJECTIVE:     Chief Complaint: Vaginal/Vulvar Itching       History of Present Illness:  Flor is a 72 yr old female who presents as a referral from her PCP with complaints of vulvar and vaginal itching. She states that this has been a chronic complaint over the past 2-3 months. She has been treated with nystatin cream, oral diflucan, triamcinolone ointment and "Cortisone 10," all of which have brought some but incomplete relief. She is  and had a partial hysterectomy. She is not followed by an OBGYN.     Review of patient's allergies indicates:   Allergen Reactions    Penicillins Hives    Hydrochlorothiazide Itching       Past Medical History:   Diagnosis Date    Hypertension      Past Surgical History:   Procedure Laterality Date    ARTHROSCOPIC REPAIR OF ROTATOR CUFF OF SHOULDER Left 2021    Procedure: REPAIR, ROTATOR CUFF, ARTHROSCOPIC, AC resection;  Surgeon: Michael Short Jr., MD;  Location: [facility] OR;  Service: Orthopedics;  Laterality: Left;  need opus system  Worship notified 21 CC    ARTHROSCOPY OF SHOULDER WITH DECOMPRESSION OF SUBACROMIAL SPACE  2021    Procedure: ARTHROSCOPY, SHOULDER, WITH SUBACROMIAL SPACE DECOMPRESSION;  Surgeon: Michael Short Jr., MD;  Location: [facility] OR;  Service: Orthopedics;;    CHOLECYSTECTOMY      COLONOSCOPY N/A 2022    Procedure: COLONOSCOPY Suprep;  Surgeon: Prince Cummings MD;  Location: [facility] ENDO;  Service: Endoscopy;  Laterality: N/A;    HYSTERECTOMY      partial in her 40's    OOPHORECTOMY       OB History          3    Para   3    Term   3            AB        Living             SAB        IAB        Ectopic        Multiple        Live Births                   Family History   Problem Relation Age of Onset    Hyperlipidemia Mother     Heart attack Mother     Coronary artery disease Mother     Diabetes Father     Diabetes Mellitus Father     Peripheral vascular disease Father     No Known " Problems Sister     Heart disease Sister     Hypertension Sister     Hypertension Brother     Peripheral vascular disease Brother     No Known Problems Daughter     No Known Problems Son      Social History     Tobacco Use    Smoking status: Never    Smokeless tobacco: Never   Substance Use Topics    Alcohol use: No    Drug use: No       Current Outpatient Medications   Medication Sig    amlodipine-benazepril 10-20mg (LOTREL) 10-20 mg per capsule Take 1 capsule by mouth once daily.    cetirizine (ZYRTEC) 10 MG tablet Take 1 tablet (10 mg total) by mouth once daily.    cycloSPORINE (RESTASIS) 0.05 % ophthalmic emulsion Place 1 drop into both eyes 2 (two) times daily.    ELIQUIS 5 mg Tab TAKE 1 TABLET TWICE A DAY    EUTHYROX 112 mcg tablet Take 112 mcg by mouth once daily.    fluconazole (DIFLUCAN) 150 MG Tab Take 1 tablet (150 mg total) by mouth once daily. for 1 day    oxybutynin (DITROPAN-XL) 5 MG TR24 Take 2 tablets (10 mg total) by mouth once daily.    pramoxine (SARNA SENSITIVE) 1 % Lotn Apply 1 application topically 2 (two) times a day.    propylene glycoL (SYSTANE BALANCE) 0.6 % Drop Apply 2 drops to eye 2 (two) times daily.    triamcinolone acetonide 0.1% (KENALOG) 0.1 % ointment Apply topically 2 (two) times daily as needed (itching).    donepeziL (ARICEPT) 5 MG tablet Take 1 tablet (5 mg total) by mouth every evening.     No current facility-administered medications for this visit.     Facility-Administered Medications Ordered in Other Visits   Medication    simethicone 40 mg/0.6 mL drops       Review of Systems:  Review of Systems   Constitutional:  Negative for chills, fatigue and fever.   HENT:  Negative for congestion.    Eyes:  Negative for visual disturbance.   Respiratory:  Negative for cough and shortness of breath.    Cardiovascular:  Negative for chest pain and palpitations.   Gastrointestinal:  Negative for abdominal distention, abdominal pain, constipation, diarrhea, nausea and vomiting.    Genitourinary:  Negative for difficulty urinating, dysuria, hematuria, vaginal bleeding and vaginal discharge.        Vulvar itching   Skin:  Negative for rash.   Neurological:  Negative for dizziness, seizures, light-headedness and headaches.   Hematological:  Does not bruise/bleed easily.   Psychiatric/Behavioral:  Negative for dysphoric mood. The patient is not nervous/anxious.       OBJECTIVE:     Physical Exam:  Vitals:    10/06/22 0810   BP: (!) 115/58      Physical Exam  Vitals and nursing note reviewed. Exam conducted with a chaperone present.   Constitutional:       General: She is not in acute distress.     Appearance: She is well-developed.   HENT:      Head: Normocephalic and atraumatic.   Eyes:      Pupils: Pupils are equal, round, and reactive to light.   Cardiovascular:      Rate and Rhythm: Normal rate and regular rhythm.   Pulmonary:      Effort: Pulmonary effort is normal. No respiratory distress.   Abdominal:      General: There is no distension.      Palpations: Abdomen is soft. There is no mass.      Tenderness: There is no abdominal tenderness. There is no guarding.   Genitourinary:         Comments: Diffuse excoriations of vulva consistent in appearance with lichen simplex chronicus.   Musculoskeletal:         General: Normal range of motion.      Cervical back: Normal range of motion and neck supple.   Skin:     General: Skin is warm and dry.   Neurological:      Mental Status: She is alert and oriented to person, place, and time.   Psychiatric:         Behavior: Behavior normal.         Thought Content: Thought content normal.         Judgment: Judgment normal.     ASSESSMENT/PLAN:     1. Atrophic vaginitis  2. Vaginal itching  - Clinical appearance consistent with lichen simplex chronicus  - Counseled patient on findings  - Will treat with Clobetasol  - RTC for unresolved symptoms, consider biopsy  - Ambulatory referral/consult to Gynecology      Miguel Angel Xiong M.D.  OB/GYN  [facility]  [place]

## 2022-10-28 ENCOUNTER — OFFICE VISIT (OUTPATIENT)
Dept: FAMILY MEDICINE | Facility: CLINIC | Age: 72
End: 2022-10-28
Payer: MEDICARE

## 2022-10-28 ENCOUNTER — LAB VISIT (OUTPATIENT)
Dept: LAB | Facility: HOSPITAL | Age: 72
End: 2022-10-28
Attending: FAMILY MEDICINE
Payer: MEDICARE

## 2022-10-28 VITALS
HEIGHT: 62 IN | WEIGHT: 153.69 LBS | BODY MASS INDEX: 28.28 KG/M2 | OXYGEN SATURATION: 98 % | SYSTOLIC BLOOD PRESSURE: 106 MMHG | HEART RATE: 73 BPM | DIASTOLIC BLOOD PRESSURE: 68 MMHG

## 2022-10-28 DIAGNOSIS — M62.838 MUSCLE SPASM: ICD-10-CM

## 2022-10-28 DIAGNOSIS — M62.838 MUSCLE SPASM: Primary | ICD-10-CM

## 2022-10-28 LAB
D DIMER PPP IA.FEU-MCNC: 0.52 MG/L FEU
MAGNESIUM SERPL-MCNC: 2.5 MG/DL (ref 1.6–2.6)
POTASSIUM SERPL-SCNC: 3.7 MMOL/L (ref 3.5–5.1)

## 2022-10-28 PROCEDURE — 83735 ASSAY OF MAGNESIUM: CPT | Performed by: FAMILY MEDICINE

## 2022-10-28 PROCEDURE — 36415 COLL VENOUS BLD VENIPUNCTURE: CPT | Performed by: FAMILY MEDICINE

## 2022-10-28 PROCEDURE — 84132 ASSAY OF SERUM POTASSIUM: CPT | Performed by: FAMILY MEDICINE

## 2022-10-28 PROCEDURE — 99999 PR PBB SHADOW E&M-EST. PATIENT-LVL III: CPT | Mod: PBBFAC,,, | Performed by: FAMILY MEDICINE

## 2022-10-28 PROCEDURE — 99999 PR PBB SHADOW E&M-EST. PATIENT-LVL III: ICD-10-PCS | Mod: PBBFAC,,, | Performed by: FAMILY MEDICINE

## 2022-10-28 PROCEDURE — 85379 FIBRIN DEGRADATION QUANT: CPT | Performed by: FAMILY MEDICINE

## 2022-10-28 PROCEDURE — 99213 PR OFFICE/OUTPT VISIT, EST, LEVL III, 20-29 MIN: ICD-10-PCS | Mod: S$PBB,,, | Performed by: FAMILY MEDICINE

## 2022-10-28 PROCEDURE — 99213 OFFICE O/P EST LOW 20 MIN: CPT | Mod: PBBFAC,PO | Performed by: FAMILY MEDICINE

## 2022-10-28 PROCEDURE — 99213 OFFICE O/P EST LOW 20 MIN: CPT | Mod: S$PBB,,, | Performed by: FAMILY MEDICINE

## 2022-10-28 RX ORDER — METHOCARBAMOL 500 MG/1
500 TABLET, FILM COATED ORAL 3 TIMES DAILY PRN
Qty: 30 TABLET | Refills: 2 | Status: SHIPPED | OUTPATIENT
Start: 2022-10-28 | End: 2022-11-07

## 2022-10-28 RX ORDER — METHOCARBAMOL 500 MG/1
500 TABLET, FILM COATED ORAL 3 TIMES DAILY PRN
Qty: 30 TABLET | Refills: 2 | Status: SHIPPED | OUTPATIENT
Start: 2022-10-28 | End: 2022-10-28 | Stop reason: SDUPTHER

## 2022-10-28 NOTE — PROGRESS NOTES
(Portions of this note were dictated using voice recognition software and may contain dictation related errors in spelling/grammar/syntax not found on text review)    CC:   Chief Complaint   Patient presents with    Spasms       HPI: 72 y.o. female, pt of Dr New, presented with muscle spasm as urgent care visit. She has medical history significant for dementia without behavioral disturbance, hypertension, paroxysmal Afib. Pt reports having muscle spasm in the right leg for the past few weeks, initially she had muscle spasms in both legs but now they are more prominent in right leg, describes as tightness, symptoms aggravate at night time or while sitting and watching TV, lasts for few seconds and resolves spontaneously. She in on Eliquis 5 mg bid for atrial fibrillation. She has no history of blood clots. She denies having recent trauma or fall. She tries to do regular exercise with elliptical cycle at home. She denies leg swelling, redness, tenderness, chest pain, SOB, palpitations, N/V, abd pain,changes in bowel habits, urine problems. She is scheduled with PCP for follow in 12/2022 along with labs.     Past Medical History:   Diagnosis Date    Hypertension        Past Surgical History:   Procedure Laterality Date    ARTHROSCOPIC REPAIR OF ROTATOR CUFF OF SHOULDER Left 5/28/2021    Procedure: REPAIR, ROTATOR CUFF, ARTHROSCOPIC, AC resection;  Surgeon: Michael Short Jr., MD;  Location: [facility] OR;  Service: Orthopedics;  Laterality: Left;  need opus system  Catholic notified 5/11/21 CC    ARTHROSCOPY OF SHOULDER WITH DECOMPRESSION OF SUBACROMIAL SPACE  5/28/2021    Procedure: ARTHROSCOPY, SHOULDER, WITH SUBACROMIAL SPACE DECOMPRESSION;  Surgeon: Michael Short Jr., MD;  Location: [facility] OR;  Service: Orthopedics;;    CHOLECYSTECTOMY      COLONOSCOPY N/A 8/29/2022    Procedure: COLONOSCOPY Suprep;  Surgeon: Prince Cummings MD;  Location: [facility] ENDO;  Service: Endoscopy;  Laterality: N/A;    HYSTERECTOMY      partial  in her 40's    OOPHORECTOMY         Family History   Problem Relation Age of Onset    Hyperlipidemia Mother     Heart attack Mother     Coronary artery disease Mother     Diabetes Father     Diabetes Mellitus Father     Peripheral vascular disease Father     No Known Problems Sister     Heart disease Sister     Hypertension Sister     Hypertension Brother     Peripheral vascular disease Brother     No Known Problems Daughter     No Known Problems Son        Social History     Tobacco Use    Smoking status: Never    Smokeless tobacco: Never   Substance Use Topics    Alcohol use: No    Drug use: No       Lab Results   Component Value Date    WBC 4.53 10/15/2021    HGB 13.0 10/15/2021    HCT 39.3 10/15/2021    MCV 87 10/15/2021     10/15/2021    CHOL 169 01/08/2020    TRIG 94 01/08/2020    HDL 43 01/08/2020    ALT 14 10/15/2021    AST 14 10/15/2021    BILITOT 0.5 10/15/2021    ALKPHOS 110 10/15/2021     10/15/2021    K 3.4 (L) 10/15/2021     10/15/2021    CREATININE 0.8 10/15/2021    ESTGFRAFRICA >60 10/15/2021    EGFRNONAA >60 10/15/2021    CALCIUM 9.0 10/15/2021    ALBUMIN 3.9 10/15/2021    BUN 8 10/15/2021    CO2 27 10/15/2021    TSH 0.466 10/15/2021    INR 1.0 12/25/2019    HGBA1C 5.1 06/09/2021    LDLCALC 107.2 01/08/2020    GLU 92 10/15/2021             Vital signs reviewed  PE:   APPEARANCE: Well nourished, well developed, in no acute distress.    HEAD: Normocephalic, atraumatic.  EYES: EOMI.  Conjunctivae noninjected.  NECK: Supple with no cervical lymphadenopathy.    CHEST: Good inspiratory effort. Lungs clear to auscultation with no wheezes or crackles.  CARDIOVASCULAR: Normal S1, S2. No rubs, murmurs, or gallops.  ABDOMEN: Bowel sounds normal. Not distended. Soft. No tenderness or masses. No organomegaly.  EXTREMITIES: No edema, cyanosis, or clubbing.homans sign negative, no erythema, no TTP    Review of Systems   Constitutional:  Negative for chills, fatigue and fever.   HENT: Negative.      Respiratory:  Negative for cough, shortness of breath and wheezing.    Cardiovascular:  Negative for chest pain, palpitations and leg swelling.   Gastrointestinal:  Negative for abdominal pain, change in bowel habit, constipation, diarrhea, nausea, vomiting, reflux and change in bowel habit.   Genitourinary: Negative.    Musculoskeletal: Negative.    Neurological: Negative.    Psychiatric/Behavioral: Negative.     All other systems reviewed and are negative.    IMPRESSION  1. Muscle spasm            PLAN      1. Muscle spasm    Does not seem to be blood clot, she is already on Eliquis, will do d dimer and electrolytes, prescribed muscle relaxants to take as needed    - POTASSIUM; Future  - MAGNESIUM; Future  - D dimer, quantitative; Future  - methocarbamoL (ROBAXIN) 500 MG Tab; Take 1 tablet (500 mg total) by mouth 3 (three) times daily as needed.  Dispense: 30 tablet; Refill: 2         Age/demographic appropriate health maintenance:    Health Maintenance Due   Topic Date Due    Pneumococcal Vaccines (Age 65+) (2 - PCV) 01/31/2019    COVID-19 Vaccine (5 - Booster) 06/30/2022         F/U with PCP    Manjeet Milain   10/28/2022

## 2022-11-03 ENCOUNTER — OFFICE VISIT (OUTPATIENT)
Dept: ORTHOPEDICS | Facility: CLINIC | Age: 72
End: 2022-11-03
Payer: MEDICARE

## 2022-11-03 VITALS — WEIGHT: 150 LBS | HEIGHT: 62 IN | BODY MASS INDEX: 27.6 KG/M2

## 2022-11-03 DIAGNOSIS — M19.049 ARTHRITIS OF FINGER: Primary | ICD-10-CM

## 2022-11-03 PROCEDURE — 99999 PR PBB SHADOW E&M-EST. PATIENT-LVL III: CPT | Mod: PBBFAC,,, | Performed by: ORTHOPAEDIC SURGERY

## 2022-11-03 PROCEDURE — 99213 PR OFFICE/OUTPT VISIT, EST, LEVL III, 20-29 MIN: ICD-10-PCS | Mod: S$PBB,,, | Performed by: ORTHOPAEDIC SURGERY

## 2022-11-03 PROCEDURE — 99213 OFFICE O/P EST LOW 20 MIN: CPT | Mod: S$PBB,,, | Performed by: ORTHOPAEDIC SURGERY

## 2022-11-03 PROCEDURE — 99999 PR PBB SHADOW E&M-EST. PATIENT-LVL III: ICD-10-PCS | Mod: PBBFAC,,, | Performed by: ORTHOPAEDIC SURGERY

## 2022-11-03 PROCEDURE — 99213 OFFICE O/P EST LOW 20 MIN: CPT | Mod: PBBFAC,PN | Performed by: ORTHOPAEDIC SURGERY

## 2022-11-03 NOTE — PROGRESS NOTES
Subjective:      Patient ID: Flor Erwin is a 72 y.o. female.  Chief Complaint: Follow-up (R Middle Finger )      HPI  Flor Erwin is a  72 y.o. female presenting today for follow up of right hand arthritis.  She reports that she is much improved after the injection into the middle finger  Has much better motion and minimal pain.    Review of patient's allergies indicates:   Allergen Reactions    Penicillins Hives    Hydrochlorothiazide Itching         Current Outpatient Medications   Medication Sig Dispense Refill    amlodipine-benazepril 10-20mg (LOTREL) 10-20 mg per capsule Take 1 capsule by mouth once daily. 90 capsule 3    cetirizine (ZYRTEC) 10 MG tablet Take 1 tablet (10 mg total) by mouth once daily. 30 tablet 2    clobetasol 0.05% (TEMOVATE) 0.05 % Oint Apply topically 2 (two) times daily. 30 g 2    cycloSPORINE (RESTASIS) 0.05 % ophthalmic emulsion Place 1 drop into both eyes 2 (two) times daily.      ELIQUIS 5 mg Tab TAKE 1 TABLET TWICE A  tablet 3    EUTHYROX 112 mcg tablet Take 112 mcg by mouth once daily.      methocarbamoL (ROBAXIN) 500 MG Tab Take 1 tablet (500 mg total) by mouth 3 (three) times daily as needed. 30 tablet 2    oxybutynin (DITROPAN-XL) 5 MG TR24 TAKE 2 TABLETS ONCE DAILY 180 tablet 3    pramoxine (SARNA SENSITIVE) 1 % Lotn Apply 1 application topically 2 (two) times a day. 222 mL 2    propylene glycoL (SYSTANE BALANCE) 0.6 % Drop Apply 2 drops to eye 2 (two) times daily. 3 Bottle 3    triamcinolone acetonide 0.1% (KENALOG) 0.1 % ointment Apply topically 2 (two) times daily as needed (itching). 30 g 2    donepeziL (ARICEPT) 5 MG tablet Take 1 tablet (5 mg total) by mouth every evening. 90 tablet 1     No current facility-administered medications for this visit.     Facility-Administered Medications Ordered in Other Visits   Medication Dose Route Frequency Provider Last Rate Last Admin    simethicone 40 mg/0.6 mL drops    PRN Prince Cummings MD   40 mg at 08/29/22  "[id number]       Past Medical History:   Diagnosis Date    Hypertension        Past Surgical History:   Procedure Laterality Date    ARTHROSCOPIC REPAIR OF ROTATOR CUFF OF SHOULDER Left 5/28/2021    Procedure: REPAIR, ROTATOR CUFF, ARTHROSCOPIC, AC resection;  Surgeon: Michael Short Jr., MD;  Location: [facility] OR;  Service: Orthopedics;  Laterality: Left;  need opus system  Mandaen notified 5/11/21 CC    ARTHROSCOPY OF SHOULDER WITH DECOMPRESSION OF SUBACROMIAL SPACE  5/28/2021    Procedure: ARTHROSCOPY, SHOULDER, WITH SUBACROMIAL SPACE DECOMPRESSION;  Surgeon: Michael Short Jr., MD;  Location: [facility] OR;  Service: Orthopedics;;    CHOLECYSTECTOMY      COLONOSCOPY N/A 8/29/2022    Procedure: COLONOSCOPY Suprep;  Surgeon: Prince Cummings MD;  Location: [facility] ENDO;  Service: Endoscopy;  Laterality: N/A;    HYSTERECTOMY      partial in her 40's    OOPHORECTOMY         OBJECTIVE:   PHYSICAL EXAM:  Height: 5' 2" (157.5 cm) Weight: 68 kg (150 lb)  Vitals:    11/03/22 1457   Weight: 68 kg (150 lb)   Height: 5' 2" (1.575 m)   PainSc: 0-No pain     Ortho/SPM Exam  Examination right hand almost full range of motion little bit of stiffness at the PIP joint no triggering   Tinel sign negative no swelling    RADIOGRAPHS:  None  Comments: I have personally reviewed the imaging and I agree with the above radiologist's report.    ASSESSMENT/PLAN:     IMPRESSION:  Right middle finger arthritis improved    PLAN:  I recommended some range of motion exercises with a squeeze ball   Advil or Motrin as needed    FOLLOW UP:  2-3 months or as needed    Disclaimer: This note has been generated using voice-recognition software. There may be typographical errors that have been missed during proof-reading.     "

## 2022-12-01 ENCOUNTER — TELEPHONE (OUTPATIENT)
Dept: FAMILY MEDICINE | Facility: CLINIC | Age: 72
End: 2022-12-01
Payer: MEDICARE

## 2022-12-01 NOTE — TELEPHONE ENCOUNTER
----- Message from Jenise Poe sent at 12/1/2022 11:16 AM CST -----  Type:  Needs Medical Advice    Who Called: pt  Symptoms (please be specific):    How long has patient had these symptoms:    Pharmacy name and phone #:    Would the patient rather a call back or a response via [facility]? call  Best Call Back Number: [phone] - [phone]  Additional Information: wants to speak to office about her labs and infusion. She has r/s her labs for the time being

## 2023-02-17 ENCOUNTER — TELEPHONE (OUTPATIENT)
Dept: FAMILY MEDICINE | Facility: CLINIC | Age: 73
End: 2023-02-17
Payer: MEDICARE

## 2023-02-17 ENCOUNTER — TELEPHONE (OUTPATIENT)
Dept: ORTHOPEDICS | Facility: CLINIC | Age: 73
End: 2023-02-17
Payer: MEDICARE

## 2023-02-17 DIAGNOSIS — M25.511 RIGHT SHOULDER PAIN, UNSPECIFIED CHRONICITY: Primary | ICD-10-CM

## 2023-02-17 NOTE — TELEPHONE ENCOUNTER
----- Message from Michael Ma sent at 2/17/2023 11:26 AM CST -----  Type:  Sooner Apoointment Request    Caller is requesting a sooner appointment.  Caller declined first available appointment listed below.  Caller will not accept being placed on the waitlist and is requesting a message be sent to doctor.  Name of Caller:self  When is the first available appointment?5/2023  Symptoms:physical   Would the patient rather a call back or a response via [facility]? call  Best Call Back Number:[phone]  Additional Information: monika

## 2023-02-20 ENCOUNTER — OFFICE VISIT (OUTPATIENT)
Dept: ORTHOPEDICS | Facility: CLINIC | Age: 73
End: 2023-02-20
Payer: MEDICARE

## 2023-02-20 ENCOUNTER — HOSPITAL ENCOUNTER (OUTPATIENT)
Dept: RADIOLOGY | Facility: HOSPITAL | Age: 73
Discharge: HOME | End: 2023-02-20
Attending: ORTHOPAEDIC SURGERY
Payer: MEDICARE

## 2023-02-20 VITALS — HEIGHT: 62 IN | BODY MASS INDEX: 27.6 KG/M2 | WEIGHT: 150 LBS

## 2023-02-20 DIAGNOSIS — M25.511 RIGHT SHOULDER PAIN, UNSPECIFIED CHRONICITY: ICD-10-CM

## 2023-02-20 DIAGNOSIS — M75.122 NONTRAUMATIC COMPLETE TEAR OF LEFT ROTATOR CUFF: Primary | ICD-10-CM

## 2023-02-20 PROCEDURE — 99999 PR PBB SHADOW E&M-EST. PATIENT-LVL III: ICD-10-PCS | Mod: PBBFAC,,, | Performed by: ORTHOPAEDIC SURGERY

## 2023-02-20 PROCEDURE — 73030 X-RAY EXAM OF SHOULDER: CPT | Mod: 26,RT,, | Performed by: RADIOLOGY

## 2023-02-20 PROCEDURE — 20610 DRAIN/INJ JOINT/BURSA W/O US: CPT | Mod: 50,PBBFAC,PN | Performed by: ORTHOPAEDIC SURGERY

## 2023-02-20 PROCEDURE — 99999 PR PBB SHADOW E&M-EST. PATIENT-LVL III: CPT | Mod: PBBFAC,,, | Performed by: ORTHOPAEDIC SURGERY

## 2023-02-20 PROCEDURE — 99213 OFFICE O/P EST LOW 20 MIN: CPT | Mod: PBBFAC,PN,25 | Performed by: ORTHOPAEDIC SURGERY

## 2023-02-20 PROCEDURE — 73030 X-RAY EXAM OF SHOULDER: CPT | Mod: TC,PN,RT

## 2023-02-20 PROCEDURE — 20610 DRAIN/INJ JOINT/BURSA W/O US: CPT | Mod: 50,S$PBB,, | Performed by: ORTHOPAEDIC SURGERY

## 2023-02-20 PROCEDURE — 99213 OFFICE O/P EST LOW 20 MIN: CPT | Mod: S$PBB,25,, | Performed by: ORTHOPAEDIC SURGERY

## 2023-02-20 PROCEDURE — 73030 XR SHOULDER COMPLETE 2 OR MORE VIEWS RIGHT: ICD-10-PCS | Mod: 26,RT,, | Performed by: RADIOLOGY

## 2023-02-20 PROCEDURE — 20610 PR DRAIN/INJECT LARGE JOINT/BURSA: ICD-10-PCS | Mod: 50,S$PBB,, | Performed by: ORTHOPAEDIC SURGERY

## 2023-02-20 PROCEDURE — 99213 PR OFFICE/OUTPT VISIT, EST, LEVL III, 20-29 MIN: ICD-10-PCS | Mod: S$PBB,25,, | Performed by: ORTHOPAEDIC SURGERY

## 2023-02-20 RX ORDER — TRIAMCINOLONE ACETONIDE 40 MG/ML
40 INJECTION, SUSPENSION INTRA-ARTICULAR; INTRAMUSCULAR
Status: COMPLETED | OUTPATIENT
Start: 2023-02-20 | End: 2023-02-20

## 2023-02-20 RX ADMIN — TRIAMCINOLONE ACETONIDE 40 MG: 40 INJECTION, SUSPENSION INTRA-ARTICULAR; INTRAMUSCULAR at 03:02

## 2023-02-20 NOTE — PROGRESS NOTES
Subjective:      Patient ID: Flor Erwin is a 72 y.o. female.  Chief Complaint: Follow-up of the Right Shoulder      HPI  Flor Erwin is a  72 y.o. female presenting today for follow up of bilateral shoulder pain.  She reports that she is having a flare-up in both shoulders   She has had surgery on the left shoulder several years ago   Right shoulder seems to hurt worse than left shoulder   .    Review of patient's allergies indicates:   Allergen Reactions    Penicillins Hives    Hydrochlorothiazide Itching         Current Outpatient Medications   Medication Sig Dispense Refill    amlodipine-benazepril 10-20mg (LOTREL) 10-20 mg per capsule TAKE 1 CAPSULE DAILY 90 capsule 3    cetirizine (ZYRTEC) 10 MG tablet Take 1 tablet (10 mg total) by mouth once daily. 30 tablet 2    clobetasol 0.05% (TEMOVATE) 0.05 % Oint Apply topically Daily. 45 g 1    cycloSPORINE (RESTASIS) 0.05 % ophthalmic emulsion Place 1 drop into both eyes 2 (two) times daily.      donepeziL (ARICEPT) 5 MG tablet Take 1 tablet (5 mg total) by mouth every evening. 90 tablet 1    ELIQUIS 5 mg Tab TAKE 1 TABLET TWICE A  tablet 3    EUTHYROX 112 mcg tablet Take 112 mcg by mouth once daily.      oxybutynin (DITROPAN-XL) 5 MG TR24 TAKE 2 TABLETS ONCE DAILY 180 tablet 3    pramoxine (SARNA SENSITIVE) 1 % Lotn Apply 1 application topically 2 (two) times a day. 222 mL 2    propylene glycoL (SYSTANE BALANCE) 0.6 % Drop Apply 2 drops to eye 2 (two) times daily. 3 Bottle 3    triamcinolone acetonide 0.1% (KENALOG) 0.1 % ointment Apply topically 2 (two) times daily as needed (itching). 30 g 2     No current facility-administered medications for this visit.     Facility-Administered Medications Ordered in Other Visits   Medication Dose Route Frequency Provider Last Rate Last Admin    simethicone 40 mg/0.6 mL drops    PRN Prince Cummings MD   40 mg at 08/29/22 0924       Past Medical History:   Diagnosis Date    Hypertension        Past Surgical  "History:   Procedure Laterality Date    ARTHROSCOPIC REPAIR OF ROTATOR CUFF OF SHOULDER Left 5/28/2021    Procedure: REPAIR, ROTATOR CUFF, ARTHROSCOPIC, AC resection;  Surgeon: Michael Short Jr., MD;  Location: [facility] OR;  Service: Orthopedics;  Laterality: Left;  need opus system  Ahsan notified 5/11/21 CC    ARTHROSCOPY OF SHOULDER WITH DECOMPRESSION OF SUBACROMIAL SPACE  5/28/2021    Procedure: ARTHROSCOPY, SHOULDER, WITH SUBACROMIAL SPACE DECOMPRESSION;  Surgeon: Michael Short Jr., MD;  Location: [facility] OR;  Service: Orthopedics;;    CHOLECYSTECTOMY      COLONOSCOPY N/A 8/29/2022    Procedure: COLONOSCOPY Suprep;  Surgeon: Prince Cummings MD;  Location: [facility] ENDO;  Service: Endoscopy;  Laterality: N/A;    HYSTERECTOMY      partial in her 40's    OOPHORECTOMY         OBJECTIVE:   PHYSICAL EXAM:  Height: 5' 2" (157.5 cm) Weight: 68 kg (150 lb)  Vitals:    02/20/23 1454   Weight: 68 kg (150 lb)   Height: 5' 2" (1.575 m)   PainSc: 10-Worst pain ever     Ortho/SPM Exam  Examination of the shoulders both shoulders have mild tenderness   No bruising no swelling range of motion little bit better on the left shoulder compared to the right the right has a positive impingement sign slight weakness rotator cuff  Neurologic exam intact    RADIOGRAPHS:  AP lateral x-ray of the right shoulder shows some degenerative changes slight elevation of the humeral head  Comments: I have personally reviewed the imaging and I agree with the above radiologist's report.    ASSESSMENT/PLAN:     IMPRESSION:  1. Bilateral shoulder pain.      2. Possible rotator cuff tear right shoulder    PLAN:  She would like injections for both shoulders today   After pause for time-out identified each shoulder injected bilaterally with combination Kenalog 40 mg 2 cc xylocaine sterile technique  Tolerated the procedure well without complication  Recommend she avoid overhead lifting continue Advil or Tylenol for pain  If symptoms in the right shoulder " persist I would recommend an MRI scan    FOLLOW UP:  6-8 weeks    Disclaimer: This note has been generated using voice-recognition software. There may be typographical errors that have been missed during proof-reading.

## 2023-02-23 ENCOUNTER — OFFICE VISIT (OUTPATIENT)
Dept: OBSTETRICS AND GYNECOLOGY | Facility: CLINIC | Age: 73
End: 2023-02-23
Payer: MEDICARE

## 2023-02-23 VITALS — DIASTOLIC BLOOD PRESSURE: 64 MMHG | WEIGHT: 147.06 LBS | SYSTOLIC BLOOD PRESSURE: 110 MMHG | BODY MASS INDEX: 26.9 KG/M2

## 2023-02-23 DIAGNOSIS — N30.00 ACUTE CYSTITIS WITHOUT HEMATURIA: Primary | ICD-10-CM

## 2023-02-23 PROCEDURE — 99999 PR PBB SHADOW E&M-EST. PATIENT-LVL III: CPT | Mod: PBBFAC,,, | Performed by: STUDENT IN AN ORGANIZED HEALTH CARE EDUCATION/TRAINING PROGRAM

## 2023-02-23 PROCEDURE — 99213 OFFICE O/P EST LOW 20 MIN: CPT | Mod: PBBFAC,PO | Performed by: STUDENT IN AN ORGANIZED HEALTH CARE EDUCATION/TRAINING PROGRAM

## 2023-02-23 PROCEDURE — 87086 URINE CULTURE/COLONY COUNT: CPT | Performed by: STUDENT IN AN ORGANIZED HEALTH CARE EDUCATION/TRAINING PROGRAM

## 2023-02-23 PROCEDURE — 99213 PR OFFICE/OUTPT VISIT, EST, LEVL III, 20-29 MIN: ICD-10-PCS | Mod: S$PBB,,, | Performed by: STUDENT IN AN ORGANIZED HEALTH CARE EDUCATION/TRAINING PROGRAM

## 2023-02-23 PROCEDURE — 87088 URINE BACTERIA CULTURE: CPT | Performed by: STUDENT IN AN ORGANIZED HEALTH CARE EDUCATION/TRAINING PROGRAM

## 2023-02-23 PROCEDURE — 87077 CULTURE AEROBIC IDENTIFY: CPT | Performed by: STUDENT IN AN ORGANIZED HEALTH CARE EDUCATION/TRAINING PROGRAM

## 2023-02-23 PROCEDURE — 87186 SC STD MICRODIL/AGAR DIL: CPT | Performed by: STUDENT IN AN ORGANIZED HEALTH CARE EDUCATION/TRAINING PROGRAM

## 2023-02-23 PROCEDURE — 99999 PR PBB SHADOW E&M-EST. PATIENT-LVL III: ICD-10-PCS | Mod: PBBFAC,,, | Performed by: STUDENT IN AN ORGANIZED HEALTH CARE EDUCATION/TRAINING PROGRAM

## 2023-02-23 PROCEDURE — 99213 OFFICE O/P EST LOW 20 MIN: CPT | Mod: S$PBB,,, | Performed by: STUDENT IN AN ORGANIZED HEALTH CARE EDUCATION/TRAINING PROGRAM

## 2023-02-23 RX ORDER — NITROFURANTOIN 25; 75 MG/1; MG/1
100 CAPSULE ORAL 2 TIMES DAILY
Qty: 14 CAPSULE | Refills: 0 | Status: SHIPPED | OUTPATIENT
Start: 2023-02-23 | End: 2023-03-02

## 2023-02-23 RX ORDER — ESTRADIOL 0.1 MG/G
1 CREAM VAGINAL DAILY
Qty: 42.5 G | Refills: 3 | Status: SHIPPED | OUTPATIENT
Start: 2023-02-23

## 2023-02-23 RX ORDER — PHENAZOPYRIDINE HYDROCHLORIDE 200 MG/1
200 TABLET, FILM COATED ORAL 3 TIMES DAILY PRN
Qty: 20 TABLET | Refills: 0 | Status: SHIPPED | OUTPATIENT
Start: 2023-02-23 | End: 2023-07-06

## 2023-02-23 NOTE — PROGRESS NOTES
CC: Urinary Tract Infection    HPI:  Flor Erwin is a 72 y.o. female  presents with complaint of UTI symptoms. Reports she has had symptoms for 3 weeks, minimal improvement with just drinking cranberry juice. Symptoms include frequency, urinary loss (has to wear depends right now), pain with urination. No blood in urine, no pelvic pain or back pain. No fevers at home.    ROS:  GENERAL: No fever, chills, fatigability or weight loss.  VULVAR: No pain, no lesions and no itching.  VAGINAL: No relaxation, no itching, no discharge, no abnormal bleeding and no lesions.  ABDOMEN: No abdominal pain. Denies nausea. Denies vomiting. No diarrhea. No constipation  BREAST: Denies pain. No lumps. No discharge.  URINARY: see above HPI  CARDIOVASCULAR: No chest pain. No shortness of breath. No leg cramps.  NEUROLOGICAL: No headaches. No vision changes.      Patient History:  Past Medical History:   Diagnosis Date    Hypertension      Past Surgical History:   Procedure Laterality Date    ARTHROSCOPIC REPAIR OF ROTATOR CUFF OF SHOULDER Left 2021    Procedure: REPAIR, ROTATOR CUFF, ARTHROSCOPIC, AC resection;  Surgeon: Michael Short Jr., MD;  Location: [facility] OR;  Service: Orthopedics;  Laterality: Left;  need opus system  [facility] notified 21 CC    ARTHROSCOPY OF SHOULDER WITH DECOMPRESSION OF SUBACROMIAL SPACE  2021    Procedure: ARTHROSCOPY, SHOULDER, WITH SUBACROMIAL SPACE DECOMPRESSION;  Surgeon: Michael Short Jr., MD;  Location: [facility] OR;  Service: Orthopedics;;    CHOLECYSTECTOMY      COLONOSCOPY N/A 2022    Procedure: COLONOSCOPY Suprep;  Surgeon: Prince Cummings MD;  Location: [facility] ENDO;  Service: Endoscopy;  Laterality: N/A;    HYSTERECTOMY      partial in her 40's    OOPHORECTOMY       Social History     Tobacco Use    Smoking status: Never    Smokeless tobacco: Never   Substance Use Topics    Alcohol use: No    Drug use: No     Family History   Problem Relation Age of Onset     Hyperlipidemia Mother     Heart attack Mother     Coronary artery disease Mother     Diabetes Father     Diabetes Mellitus Father     Peripheral vascular disease Father     No Known Problems Sister     Heart disease Sister     Hypertension Sister     Hypertension Brother     Peripheral vascular disease Brother     No Known Problems Daughter     No Known Problems Son      OB History    Para Term  AB Living   3 3 3     3   SAB IAB Ectopic Multiple Live Births           3      # Outcome Date GA Lbr Donald/2nd Weight Sex Delivery Anes PTL Lv   3 Term      Vag-Spont   ISRA   2 Term      Vag-Spont   ISRA   1 Term      Vag-Spont   ISRA       Objective:   /64   Wt 66.7 kg (147 lb 0.8 oz)   LMP  (LMP Unknown)   BMI 26.90 kg/m²   No LMP recorded (lmp unknown). Patient is postmenopausal.      PHYSICAL EXAM:  APPEARANCE: Well nourished, well developed, in no acute distress.  AFFECT: WNL, alert and oriented x 3  SKIN: No acne or hirsutism  NECK: Neck symmetric without masses or thyromegaly  NODES: No inguinal, cervical, axillary, or femoral lymph node enlargement  CHEST: Good respiratory effect  ABDOMEN: Soft.  No tenderness or masses.  No hepatosplenomegaly.  No hernias.  EXTREMITIES: No edema.      ASSESSMENT and PLAN:    ICD-10-CM ICD-9-CM    1. Acute cystitis without hematuria  N30.00 595.0 nitrofurantoin, macrocrystal-monohydrate, (MACROBID) 100 MG capsule      Urine culture      estradioL (ESTRACE) 0.01 % (0.1 mg/gram) vaginal cream      phenazopyridine (PYRIDIUM) 200 MG tablet          UTI uncomplicated without evidence of pyelonephritis  - urine culture collected  - discussed abx treatment options, adjustments will be made if culture indicates alternative sensitivities. Ruba sent to patient pharmacy for initial treatment   - encouraged increasing PO fluids, may use Pyridium prn.   - recommended vaginal estrogen use to prevent future UTI once this episode is treated, rx sent to pharmacy   - Call or  return to clinic prn if these symptoms worsen or fail to improve as anticipated.      Follow up: as needed if symptoms worsen or 1 year WWE Stephanie Heaney, MD OBGYN [facility]

## 2023-02-26 LAB — BACTERIA UR CULT: ABNORMAL

## 2023-03-02 ENCOUNTER — TELEPHONE (OUTPATIENT)
Dept: FAMILY MEDICINE | Facility: CLINIC | Age: 73
End: 2023-03-02
Payer: MEDICARE

## 2023-03-02 NOTE — TELEPHONE ENCOUNTER
----- Message from Carey Talbot MA sent at 3/2/2023  4:52 PM CST -----    ----- Message -----  From: Christy Acosta  Sent: 3/1/2023   3:20 PM CST  To: Virgen Harry Staff    Type:  Needs Medical Advice    Who Called:  Pt  Would the patient rather a call back or a response via [facility]?  call  Best Call Back Number:  [phone]  Additional Information:  Pt would like a call back regarding her thyroid test results. Pt would like a sooner appt than what available in May.

## 2023-03-05 ENCOUNTER — PATIENT MESSAGE (OUTPATIENT)
Dept: ADMINISTRATIVE | Facility: OTHER | Age: 73
End: 2023-03-05
Payer: MEDICARE

## 2023-03-09 ENCOUNTER — PATIENT MESSAGE (OUTPATIENT)
Dept: ADMINISTRATIVE | Facility: OTHER | Age: 73
End: 2023-03-09
Payer: MEDICARE

## 2023-04-03 ENCOUNTER — OFFICE VISIT (OUTPATIENT)
Dept: FAMILY MEDICINE | Facility: CLINIC | Age: 73
End: 2023-04-03
Payer: MEDICARE

## 2023-04-03 VITALS
HEART RATE: 63 BPM | OXYGEN SATURATION: 99 % | SYSTOLIC BLOOD PRESSURE: 126 MMHG | BODY MASS INDEX: 27.14 KG/M2 | WEIGHT: 147.5 LBS | DIASTOLIC BLOOD PRESSURE: 74 MMHG | HEIGHT: 62 IN

## 2023-04-03 DIAGNOSIS — R63.4 WEIGHT LOSS: ICD-10-CM

## 2023-04-03 DIAGNOSIS — R20.9 UNSPECIFIED DISTURBANCES OF SKIN SENSATION: ICD-10-CM

## 2023-04-03 DIAGNOSIS — I10 ESSENTIAL HYPERTENSION: Primary | ICD-10-CM

## 2023-04-03 DIAGNOSIS — I48.0 PAROXYSMAL A-FIB: ICD-10-CM

## 2023-04-03 DIAGNOSIS — R45.89 ANXIETY ABOUT HEALTH: ICD-10-CM

## 2023-04-03 DIAGNOSIS — E05.90 HYPERTHYROIDISM: ICD-10-CM

## 2023-04-03 DIAGNOSIS — M62.838 MUSCLE SPASM: ICD-10-CM

## 2023-04-03 PROCEDURE — 99215 PR OFFICE/OUTPT VISIT, EST, LEVL V, 40-54 MIN: ICD-10-PCS | Mod: S$PBB,,, | Performed by: INTERNAL MEDICINE

## 2023-04-03 PROCEDURE — 99999 PR PBB SHADOW E&M-EST. PATIENT-LVL IV: ICD-10-PCS | Mod: PBBFAC,,, | Performed by: INTERNAL MEDICINE

## 2023-04-03 PROCEDURE — 99214 OFFICE O/P EST MOD 30 MIN: CPT | Mod: PBBFAC,PO | Performed by: INTERNAL MEDICINE

## 2023-04-03 PROCEDURE — 99215 OFFICE O/P EST HI 40 MIN: CPT | Mod: S$PBB,,, | Performed by: INTERNAL MEDICINE

## 2023-04-03 PROCEDURE — 99999 PR PBB SHADOW E&M-EST. PATIENT-LVL IV: CPT | Mod: PBBFAC,,, | Performed by: INTERNAL MEDICINE

## 2023-04-03 RX ORDER — METHOCARBAMOL 500 MG/1
500 TABLET, FILM COATED ORAL 3 TIMES DAILY
COMMUNITY
Start: 2023-03-21 | End: 2023-04-06

## 2023-04-03 RX ORDER — MIRTAZAPINE 7.5 MG/1
7.5 TABLET, FILM COATED ORAL NIGHTLY
Qty: 30 TABLET | Refills: 11 | Status: SHIPPED | OUTPATIENT
Start: 2023-04-03 | End: 2023-04-03

## 2023-04-03 RX ORDER — FINASTERIDE 1 MG/1
1 TABLET, FILM COATED ORAL
COMMUNITY
Start: 2023-03-14

## 2023-04-03 RX ORDER — MIRTAZAPINE 7.5 MG/1
7.5 TABLET, FILM COATED ORAL NIGHTLY
Qty: 30 TABLET | Refills: 11 | Status: SHIPPED | OUTPATIENT
Start: 2023-04-03 | End: 2023-07-06

## 2023-04-03 NOTE — PROGRESS NOTES
Subjective:       Patient ID: Flor Erwin is a 72 y.o. female.    Chief Complaint: Annual Exam      HPI  Flor Erwin is a 72 y.o. female with chronic conditions of atrial fibrillation, HTN, thyroid disease, dry eyes, left rotator cuff tear  who presents today for routine health maintenance.     Reports she received treatments with Tapeza.  Side effects were decreased appetite, changes in taste, congestion, hair loss, and clogged ears and did not feel helped her eyes significantly.  Continues to follow with ophthalmology.  Dermatology evaluated hair loss and was started on supplements that seem to be helping.    Her thyroid medication was decreased as she was receiving a dose that was too strong.  Lost weight and has not seen changes in weight despite the medication changes.  Feels depressed with medications side effects, weight loss, and inability to gain weight.  Reports has been having leg cramps at night with no leg swelling.    Exercises regularly and watches her diet.    Her labs from endocrinology showed a TSH of less than 0.1, free T4 of 2.03, a normal metabolic panel and liver enzymes.    She is concerned that has noted some occasional increasing blood pressure in the last 3 days.  She is followed by digital medicine.  This morning her systolic blood pressure was in the 160s earlier but now is better.      Health Maintenance:  Health Maintenance   Topic Date Due    Mammogram  07/18/2023    DEXA Scan  04/22/2024    Lipid Panel  01/08/2025    TETANUS VACCINE  11/19/2025    Hepatitis C Screening  Discontinued       Review of Systems   Constitutional:  Positive for appetite change and unexpected weight change. Negative for fever.   HENT:          Ears felt clogged on and off   Eyes:  Positive for eye dryness.   Respiratory: Negative.     Cardiovascular: Negative.    Gastrointestinal: Negative.    Genitourinary:  Negative for difficulty urinating.   Musculoskeletal: Negative.  Positive for leg  pain (cramps).   Integumentary:  Negative.   Neurological: Negative.    Psychiatric/Behavioral:  Positive for dysphoric mood and sleep disturbance.     Past Medical History:   Diagnosis Date    Hypertension        Past Surgical History:   Procedure Laterality Date    ARTHROSCOPIC REPAIR OF ROTATOR CUFF OF SHOULDER Left 5/28/2021    Procedure: REPAIR, ROTATOR CUFF, ARTHROSCOPIC, AC resection;  Surgeon: Michael Short Jr., MD;  Location: [facility] OR;  Service: Orthopedics;  Laterality: Left;  need opus system  [employer] notified 5/11/21 CC    ARTHROSCOPY OF SHOULDER WITH DECOMPRESSION OF SUBACROMIAL SPACE  5/28/2021    Procedure: ARTHROSCOPY, SHOULDER, WITH SUBACROMIAL SPACE DECOMPRESSION;  Surgeon: Michael Short Jr., MD;  Location: [facility] OR;  Service: Orthopedics;;    CHOLECYSTECTOMY      COLONOSCOPY N/A 8/29/2022    Procedure: COLONOSCOPY Suprep;  Surgeon: Prince Cummings MD;  Location: [facility] ENDO;  Service: Endoscopy;  Laterality: N/A;    HYSTERECTOMY      partial in her 40's    OOPHORECTOMY         Family History   Problem Relation Age of Onset    Hyperlipidemia Mother     Heart attack Mother     Coronary artery disease Mother     Diabetes Father     Diabetes Mellitus Father     Peripheral vascular disease Father     No Known Problems Sister     Heart disease Sister     Hypertension Sister     Hypertension Brother     Peripheral vascular disease Brother     No Known Problems Daughter     No Known Problems Son        Social History     Socioeconomic History    Marital status:    Tobacco Use    Smoking status: Never    Smokeless tobacco: Never   Substance and Sexual Activity    Alcohol use: No    Drug use: No    Sexual activity: Not Currently     Social Determinants of Health     Financial Resource Strain: Low Risk     Difficulty of Paying Living Expenses: Not very hard   Food Insecurity: No Food Insecurity    Worried About Running Out of Food in the Last Year: Never true    Ran Out of Food in the Last Year:  Never true   Transportation Needs: No Transportation Needs    Lack of Transportation (Medical): No    Lack of Transportation (Non-Medical): No   Physical Activity: Sufficiently Active    Days of Exercise per Week: 7 days    Minutes of Exercise per Session: 30 min   Stress: No Stress Concern Present    Feeling of Stress : Not at all   Social Connections: Moderately Isolated    Frequency of Communication with Friends and Family: More than three times a week    Frequency of Social Gatherings with Friends and Family: More than three times a week    Attends Protestant Services: More than 4 times per year    Active Member of Clubs or Organizations: No    Attends Club or Organization Meetings: Never    Marital Status:    Housing Stability: Low Risk     Unable to Pay for Housing in the Last Year: No    Number of Places Lived in the Last Year: 1    Unstable Housing in the Last Year: No       Current Outpatient Medications   Medication Sig Dispense Refill    amlodipine-benazepril 10-20mg (LOTREL) 10-20 mg per capsule TAKE 1 CAPSULE DAILY 90 capsule 3    cycloSPORINE (RESTASIS) 0.05 % ophthalmic emulsion Place 1 drop into both eyes 2 (two) times daily.      donepeziL (ARICEPT) 5 MG tablet Take 1 tablet (5 mg total) by mouth every evening. 90 tablet 1    ELIQUIS 5 mg Tab TAKE 1 TABLET TWICE A  tablet 3    estradioL (ESTRACE) 0.01 % (0.1 mg/gram) vaginal cream Place 1 g vaginally once daily. 42.5 g 3    oxybutynin (DITROPAN-XL) 5 MG TR24 TAKE 2 TABLETS ONCE DAILY 180 tablet 3    cetirizine (ZYRTEC) 10 MG tablet Take 1 tablet (10 mg total) by mouth once daily. (Patient not taking: Reported on 4/3/2023) 30 tablet 2    clobetasol 0.05% (TEMOVATE) 0.05 % Oint Apply topically Daily. (Patient not taking: Reported on 4/3/2023) 45 g 1    EUTHYROX 112 mcg tablet Take 112 mcg by mouth once daily.      finasteride (PROPECIA) 1 mg tablet Take 1 mg by mouth.      magnesium glycinate 100 mg Tab Take 1 tablet by mouth nightly as  needed (leg cramps). 30 tablet 2    methocarbamoL (ROBAXIN) 500 MG Tab Take 500 mg by mouth 3 (three) times daily.      mirtazapine (REMERON) 7.5 MG Tab Take 1 tablet (7.5 mg total) by mouth every evening. 30 tablet 11    phenazopyridine (PYRIDIUM) 200 MG tablet Take 1 tablet (200 mg total) by mouth 3 (three) times daily as needed for Pain. (Patient not taking: Reported on 4/3/2023) 20 tablet 0    pramoxine (SARNA SENSITIVE) 1 % Lotn Apply 1 application topically 2 (two) times a day. (Patient not taking: Reported on 4/3/2023) 222 mL 2    propylene glycoL (SYSTANE BALANCE) 0.6 % Drop Apply 2 drops to eye 2 (two) times daily. (Patient not taking: Reported on 4/3/2023) 3 Bottle 3    triamcinolone acetonide 0.1% (KENALOG) 0.1 % ointment Apply topically 2 (two) times daily as needed (itching). (Patient not taking: Reported on 4/3/2023) 30 g 2     No current facility-administered medications for this visit.     Facility-Administered Medications Ordered in Other Visits   Medication Dose Route Frequency Provider Last Rate Last Admin    simethicone 40 mg/0.6 mL drops    PRN Prince Cummings MD   40 mg at 08/29/22 0938       Review of patient's allergies indicates:   Allergen Reactions    Penicillins Hives    Hydrochlorothiazide Itching         Objective:       Last 3 sets of Vitals    Vitals - 1 value per visit 2/23/2023 4/3/2023 4/3/2023   SYSTOLIC 110 - 126   DIASTOLIC 64 - 74   Pulse - - 63   Temp - - -   Resp - - -   SPO2 - - 99   Weight (lb) 147.05 - 147.49   Weight (kg) 66.7 - 66.9   Height - - 62   BMI (Calculated) - - 27   VISIT REPORT - - -   Pain Score  - 0 -   Some recent data might be hidden   Physical Exam  Constitutional:       General: She is not in acute distress.     Appearance: She is normal weight.   HENT:      Head: Normocephalic.      Right Ear: Tympanic membrane, ear canal and external ear normal.      Left Ear: Tympanic membrane, ear canal and external ear normal.      Nose: Nose normal.       Mouth/Throat:      Mouth: Mucous membranes are moist.      Pharynx: Oropharynx is clear.   Eyes:      General: No scleral icterus.        Right eye: No discharge.         Left eye: No discharge.      Extraocular Movements: Extraocular movements intact.      Comments: Erythematous conjunctiva    Neck:      Vascular: No carotid bruit.      Comments: No goiter.  Cardiovascular:      Rate and Rhythm: Normal rate and regular rhythm.      Pulses: Normal pulses.      Heart sounds: Normal heart sounds.   Pulmonary:      Effort: Pulmonary effort is normal.      Breath sounds: Normal breath sounds.   Abdominal:      General: Bowel sounds are normal. There is no distension.      Palpations: Abdomen is soft. There is no mass.      Tenderness: There is no abdominal tenderness.   Musculoskeletal:         General: No swelling.   Lymphadenopathy:      Cervical: No cervical adenopathy.   Skin:     General: Skin is warm and dry.   Neurological:      General: No focal deficit present.      Mental Status: She is alert and oriented to person, place, and time.   Psychiatric:         Behavior: Behavior normal.      Comments: Appears worried and anxious, mildly depressed.         CBC:  Recent Labs   Lab 04/11/21  0242 04/12/21  0028 10/15/21  0755   WBC 7.63 9.78 4.53   RBC 4.94 5.09 4.50   Hemoglobin 14.7 15.1 13.0   Hematocrit 44.4 44.7 39.3   Platelets 288 302 269   MCV 90 88 87   MCH 29.8 29.7 28.9   MCHC 33.1 33.8 33.1     CMP:  Recent Labs   Lab 04/12/21  0028 04/22/21  0955 05/25/21  0849 10/15/21  0755 10/28/22  0935   Glucose 123 H 85   < > 92  --    Calcium 9.1 9.0   < > 9.0  --    Albumin 4.1 3.8  --  3.9  --    Total Protein 7.3 6.8  --  7.0  --    Sodium 142 144   < > 143  --    Potassium 4.3 3.4 L   < > 3.4 L 3.7   CO2 29 26   < > 27  --    Chloride 104 109   < > 109  --    BUN 12 12   < > 8  --    Creatinine 0.9 0.8   < > 0.8  --    Alkaline Phosphatase 101 148 H  --  110  --    ALT 13 54 H  --  14  --    AST 14 17  --  14   --    Total Bilirubin 0.4 0.4  --  0.5  --     < > = values in this interval not displayed.     URINALYSIS:  Recent Labs   Lab 08/12/22  0920   Color, UA Yellow   Specific Gravity, UA 1.020   pH, UA 6.0   Protein, UA Negative   Nitrite, UA Negative   Leukocytes, UA Negative   Urobilinogen, UA Negative      LIPIDS:  Recent Labs   Lab 04/22/21  0955 10/15/21  0755   TSH 1.780 0.466     TSH:  Recent Labs   Lab 04/22/21  0955 10/15/21  0755   TSH 1.780 0.466       A1C:  Recent Labs   Lab 04/22/21  0955 06/09/21  0000   Hemoglobin A1C 5.2 5.1       Imaging:  X-ray Shoulder 2 or More Views Right  Narrative: EXAMINATION:  XR SHOULDER COMPLETE 2 OR MORE VIEWS RIGHT    CLINICAL HISTORY:  Pain in right shoulder    TECHNIQUE:  Two or three views of the right shoulder were performed.    COMPARISON:  None    FINDINGS:  Right glenohumeral and AC joint articulations appear intact with mild DJD.  No acute fracture, dislocation, or osseous destruction.  Impression: As above.    Electronically signed by: Ritesh Antony  Date:    02/20/2023  Time:    15:03      Assessment:       1. Essential hypertension    2. Hyperthyroidism    3. Weight loss    4. Anxiety about health    5. Paroxysmal A-fib    6. Unspecified disturbances of skin sensation    7. Muscle spasm            Plan:       1. Essential hypertension  -     CBC Auto Differential; Future; Expected date: 04/03/2023  -     Comprehensive Metabolic Panel; Future; Expected date: 04/03/2023  -     Lipid Panel; Future; Expected date: 04/03/2023  - usually well controlled.  Appears anxious.  Will continue to monitor as we treat anxiety.    2. Hyperthyroidism  -     TSH; Future; Expected date: 04/03/2023  -     T4, Free; Future; Expected date: 04/03/2023  - followed by endocrinology.  Levothyroxine was decreased    3. Weight loss  -     CBC Auto Differential; Future; Expected date: 04/03/2023  -     TSH; Future; Expected date: 04/03/2023  -     T4, Free; Future; Expected date:  04/03/2023  -     VITAMIN B12; Future; Expected date: 04/03/2023  - weight is healthy.  Encourage healthy diet.  - Remeron started to help with appetite and mood.    4. Anxiety about health  -     magnesium glycinate 100 mg Tab; Take 1 tablet by mouth nightly as needed (leg cramps).  Dispense: 30 tablet; Refill: 2  -     mirtazapine (REMERON) 7.5 MG Tab; Take 1 tablet (7.5 mg total) by mouth every evening.  Dispense: 30 tablet; Refill: 11    5. Paroxysmal A-fib  -     CBC Auto Differential; Future; Expected date: 04/03/2023  - regular rhythm and rate, on anticoagulation    6. Unspecified disturbances of skin sensation  -     VITAMIN B12; Future; Expected date: 04/03/2023    7. Muscle spasm  -     complains of muscle cramps or spasms to her legs.  Has been using Robaxin as needed.  - magnesium glycinate 100 mg Tab; Take 1 tablet by mouth nightly as needed (leg cramps).  Dispense: 30 tablet; Refill: 2           Health Maintenance Due   Topic Date Due    Pneumococcal Vaccines (Age 65+) (2 - PCV) 01/31/2019            Return to clinic in 2 months.    Kamryn New MD  [facility]  Disclaimer:  This note has been generated using voice-recognition software. There may be grammatical or spelling errors that have been missed during proof-reading

## 2023-04-13 ENCOUNTER — OFFICE VISIT (OUTPATIENT)
Dept: CARDIOLOGY | Facility: CLINIC | Age: 73
End: 2023-04-13
Payer: MEDICARE

## 2023-04-13 VITALS
BODY MASS INDEX: 27.38 KG/M2 | DIASTOLIC BLOOD PRESSURE: 60 MMHG | WEIGHT: 148.81 LBS | SYSTOLIC BLOOD PRESSURE: 87 MMHG | HEIGHT: 62 IN | HEART RATE: 92 BPM | OXYGEN SATURATION: 96 %

## 2023-04-13 DIAGNOSIS — I35.1 NONRHEUMATIC AORTIC VALVE INSUFFICIENCY: ICD-10-CM

## 2023-04-13 DIAGNOSIS — I70.0 AORTIC ATHEROSCLEROSIS: ICD-10-CM

## 2023-04-13 DIAGNOSIS — E05.90 HYPERTHYROIDISM: ICD-10-CM

## 2023-04-13 DIAGNOSIS — I48.0 PAROXYSMAL A-FIB: Primary | ICD-10-CM

## 2023-04-13 DIAGNOSIS — I10 ESSENTIAL HYPERTENSION: ICD-10-CM

## 2023-04-13 PROCEDURE — 99214 PR OFFICE/OUTPT VISIT, EST, LEVL IV, 30-39 MIN: ICD-10-PCS | Mod: S$PBB,,, | Performed by: INTERNAL MEDICINE

## 2023-04-13 PROCEDURE — 99214 OFFICE O/P EST MOD 30 MIN: CPT | Mod: S$PBB,,, | Performed by: INTERNAL MEDICINE

## 2023-04-13 PROCEDURE — 99999 PR PBB SHADOW E&M-EST. PATIENT-LVL III: ICD-10-PCS | Mod: PBBFAC,,, | Performed by: INTERNAL MEDICINE

## 2023-04-13 PROCEDURE — 99213 OFFICE O/P EST LOW 20 MIN: CPT | Mod: PBBFAC,PO | Performed by: INTERNAL MEDICINE

## 2023-04-13 PROCEDURE — 99999 PR PBB SHADOW E&M-EST. PATIENT-LVL III: CPT | Mod: PBBFAC,,, | Performed by: INTERNAL MEDICINE

## 2023-04-13 RX ORDER — ROSUVASTATIN CALCIUM 5 MG/1
5 TABLET, COATED ORAL DAILY
Qty: 90 TABLET | Refills: 3 | Status: SHIPPED | OUTPATIENT
Start: 2023-04-13 | End: 2024-04-12

## 2023-04-13 NOTE — PROGRESS NOTES
Notes: As below  Subjective:   @Patient ID:  Flor Erwin is a 72 y.o. female who presents for follow-up of Atrial fibrillation      HPI:    Here for follow up  Has been doing well since last visit  No chest pain, no palpitation  She is on Eliquis and tolerating it well   No tachy palpitations    No CVA, no TIA    She couldn't complete the event monitor due to allergic reaction to the leads.     Historically:    Patient was hospitalized 12/25 with A.fib with RVR which was a new onset.  Was admitted and converted to SR after Cardizem. Prior to that patient was diagnosed with new hyperthyroidism.     Event  Monitor 7/1/2020 ( Patient didn't complete it)    Negative event monitor with no clinical arrhythmias.           Symptoms corresponding with normal sinus rhythm.      Prior cardiovascular  Hx  --------------------------------       ECHO 12/2019 Normal left ventricular systolic function. The estimated ejection fraction is 65%  Normal LV diastolic function.  No wall motion abnormalities.  Mild aortic regurgitation.  Normal right ventricular systolic function.  Normal central venous pressure (3 mm Hg).  The estimated PA systolic pressure is 36 mm Hg    - EKG sinus sophie, lAE      Patient Active Problem List    Diagnosis Date Noted    Arthritis of finger 09/19/2022    Chronic pruritus 09/06/2022    Osteoarthritis of knee 08/25/2022    Dementia without behavioral disturbance, unspecified dementia type 06/20/2022     IMO Regualtory Update 4/1/23        Pain of left upper extremity 06/16/2021    Weakness 06/16/2021    Stiffness due to immobility 06/16/2021    Nontraumatic complete tear of left rotator cuff 12/22/2020    Thyroid eye disease 05/13/2020    Refractive error 05/13/2020    Cortical cataract of both eyes 05/13/2020    Nuclear sclerosis of both eyes 05/13/2020    Dry eye syndrome of both eyes 05/13/2020    Nonrheumatic aortic valve insufficiency 01/08/2020    Paroxysmal A-fib 12/26/2019    Hyperthyroidism  2019    Essential hypertension 2019    Polyuria 2/2 hyperthyroidism 2019    Normocytic anemia 2019    Pruritus 2019    Overactive bladder 05/15/2018           Right Arm BP - Sittin/67  Left Arm BP - Sittin/60        LAST HbA1c  Lab Results   Component Value Date    HGBA1C 5.1 2021       Lipid panel  Lab Results   Component Value Date    CHOL 169 2020     Lab Results   Component Value Date    HDL 43 2020     Lab Results   Component Value Date    LDLCALC 107.2 2020     Lab Results   Component Value Date    TRIG 94 2020     Lab Results   Component Value Date    CHOLHDL 25.4 2020            Review of Systems   Constitutional: Negative for chills and fever.   HENT:  Negative for hearing loss and nosebleeds.    Eyes:  Negative for blurred vision.   Cardiovascular:  Negative for chest pain, dyspnea on exertion and palpitations.   Respiratory:  Negative for hemoptysis and shortness of breath.    Hematologic/Lymphatic: Negative for bleeding problem.   Skin:  Negative for itching.   Musculoskeletal:  Negative for falls.   Gastrointestinal:  Negative for abdominal pain and hematochezia.   Genitourinary:  Negative for hematuria.   Neurological:  Negative for dizziness and loss of balance.   Psychiatric/Behavioral:  Negative for altered mental status and depression.      Objective:   Physical Exam  Constitutional:       Appearance: She is well-developed.   HENT:      Head: Normocephalic and atraumatic.   Eyes:      Comments: Conjunctival hyperemia, with exopthalmos   Neck:      Vascular: No JVD.   Cardiovascular:      Rate and Rhythm: Normal rate and regular rhythm.      Pulses:           Dorsalis pedis pulses are 2+ on the right side and 2+ on the left side.        Posterior tibial pulses are 2+ on the right side and 2+ on the left side.      Heart sounds: Normal heart sounds. No murmur heard.    No friction rub. No gallop.   Pulmonary:      Effort:  Pulmonary effort is normal. No respiratory distress.      Breath sounds: Normal breath sounds. No stridor. No wheezing.   Musculoskeletal:      Cervical back: Neck supple.   Skin:     General: Skin is warm and dry.   Neurological:      Mental Status: She is alert and oriented to person, place, and time.   Psychiatric:         Behavior: Behavior normal.          Assessment:     1. Paroxysmal A-fib    2. Essential hypertension    3. Nonrheumatic aortic valve insufficiency    4. Hyperthyroidism    5. Aortic atherosclerosis          Plan:     - CHADVASC is at least 3. Continue NOAC   -  We have discussed risk and benefits of OAC. Given sig risk for recurrence especially in the presence of underlying thyroid disease.  She would like to stay on OAC. Risks and benefits discussed.     - Will monitor for mild AI    - BP is well controlled. F/U with digital HTN clinic   - Evidence of aortic atherosclerosis by CT abd 5/2015. Start los dose Crestor 5 mg qhs     - F/U with endocrinology team     Pertinent cardiac images and EKG reviewed independently.    Continue with current medical plan and lifestyle changes.  Return sooner for concerns or questions. If symptoms persist go to the ED  I have reviewed all pertinent data including patient's medical history in detail and updated the computerized patient record.       1 year f/u     Follow up as scheduled.     She expressed verbal understanding and agreed with the plan    Patient's Medications   New Prescriptions    ROSUVASTATIN (CRESTOR) 5 MG TABLET    Take 1 tablet (5 mg total) by mouth once daily.   Previous Medications    AMLODIPINE-BENAZEPRIL 10-20MG (LOTREL) 10-20 MG PER CAPSULE    TAKE 1 CAPSULE DAILY    CETIRIZINE (ZYRTEC) 10 MG TABLET    Take 1 tablet (10 mg total) by mouth once daily.    CLOBETASOL 0.05% (TEMOVATE) 0.05 % OINT    Apply topically Daily.    CYCLOSPORINE (RESTASIS) 0.05 % OPHTHALMIC EMULSION    Place 1 drop into both eyes 2 (two) times daily.    DONEPEZIL  (ARICEPT) 5 MG TABLET    Take 1 tablet (5 mg total) by mouth every evening.    ELIQUIS 5 MG TAB    TAKE 1 TABLET TWICE A DAY    ESTRADIOL (ESTRACE) 0.01 % (0.1 MG/GRAM) VAGINAL CREAM    Place 1 g vaginally once daily.    EUTHYROX 112 MCG TABLET    Take 112 mcg by mouth once daily.    FINASTERIDE (PROPECIA) 1 MG TABLET    Take 1 mg by mouth.    MAGNESIUM GLYCINATE 100 MG TAB    Take 1 tablet by mouth nightly as needed (leg cramps).    METHOCARBAMOL (ROBAXIN) 500 MG TAB    Take 1 tablet (500 mg total) by mouth 2 (two) times daily as needed (spasms).    MIRTAZAPINE (REMERON) 7.5 MG TAB    Take 1 tablet (7.5 mg total) by mouth every evening.    OXYBUTYNIN (DITROPAN-XL) 5 MG TR24    TAKE 2 TABLETS ONCE DAILY    PHENAZOPYRIDINE (PYRIDIUM) 200 MG TABLET    Take 1 tablet (200 mg total) by mouth 3 (three) times daily as needed for Pain.    PRAMOXINE (SARNA SENSITIVE) 1 % LOTN    Apply 1 application topically 2 (two) times a day.    PROPYLENE GLYCOL (SYSTANE BALANCE) 0.6 % DROP    Apply 2 drops to eye 2 (two) times daily.    TRIAMCINOLONE ACETONIDE 0.1% (KENALOG) 0.1 % OINTMENT    Apply topically 2 (two) times daily as needed (itching).   Modified Medications    No medications on file   Discontinued Medications    No medications on file

## 2023-07-01 ENCOUNTER — TELEPHONE (OUTPATIENT)
Dept: FAMILY MEDICINE | Facility: CLINIC | Age: 73
End: 2023-07-01
Payer: MEDICARE

## 2023-07-01 ENCOUNTER — PATIENT MESSAGE (OUTPATIENT)
Dept: FAMILY MEDICINE | Facility: CLINIC | Age: 73
End: 2023-07-01
Payer: MEDICARE

## 2023-07-05 ENCOUNTER — TELEPHONE (OUTPATIENT)
Dept: FAMILY MEDICINE | Facility: CLINIC | Age: 73
End: 2023-07-05
Payer: MEDICARE

## 2023-07-06 ENCOUNTER — OFFICE VISIT (OUTPATIENT)
Dept: FAMILY MEDICINE | Facility: CLINIC | Age: 73
End: 2023-07-06
Payer: MEDICARE

## 2023-07-06 VITALS
SYSTOLIC BLOOD PRESSURE: 130 MMHG | WEIGHT: 143.5 LBS | HEIGHT: 62 IN | DIASTOLIC BLOOD PRESSURE: 68 MMHG | HEART RATE: 65 BPM | OXYGEN SATURATION: 97 % | BODY MASS INDEX: 26.41 KG/M2

## 2023-07-06 DIAGNOSIS — F03.90 DEMENTIA WITHOUT BEHAVIORAL DISTURBANCE: ICD-10-CM

## 2023-07-06 DIAGNOSIS — I10 ESSENTIAL HYPERTENSION: Primary | ICD-10-CM

## 2023-07-06 DIAGNOSIS — I48.0 PAROXYSMAL A-FIB: ICD-10-CM

## 2023-07-06 DIAGNOSIS — R45.89 ANXIETY ABOUT HEALTH: ICD-10-CM

## 2023-07-06 DIAGNOSIS — E05.90 HYPERTHYROIDISM: ICD-10-CM

## 2023-07-06 PROCEDURE — 99214 OFFICE O/P EST MOD 30 MIN: CPT | Mod: PBBFAC,PO,25 | Performed by: INTERNAL MEDICINE

## 2023-07-06 PROCEDURE — 99999 PR PBB SHADOW E&M-EST. PATIENT-LVL IV: CPT | Mod: PBBFAC,,, | Performed by: INTERNAL MEDICINE

## 2023-07-06 PROCEDURE — 99999 PR PBB SHADOW E&M-EST. PATIENT-LVL IV: ICD-10-PCS | Mod: PBBFAC,,, | Performed by: INTERNAL MEDICINE

## 2023-07-06 PROCEDURE — 90677 PCV20 VACCINE IM: CPT | Mod: PBBFAC,PO

## 2023-07-06 PROCEDURE — 99215 OFFICE O/P EST HI 40 MIN: CPT | Mod: S$PBB,,, | Performed by: INTERNAL MEDICINE

## 2023-07-06 PROCEDURE — 99215 PR OFFICE/OUTPT VISIT, EST, LEVL V, 40-54 MIN: ICD-10-PCS | Mod: S$PBB,,, | Performed by: INTERNAL MEDICINE

## 2023-07-06 RX ORDER — LEVOTHYROXINE SODIUM 88 UG/1
88 TABLET ORAL
COMMUNITY

## 2023-07-06 NOTE — PROGRESS NOTES
Subjective:       Patient ID: Flor Erwin is a 73 y.o. female.    Chief Complaint: Hypertension      HPI  Flor Erwin is a 73 y.o. female with atrial fibrillation, HTN, thyroid disease, dry eyes, left rotator cuff tear, and early dementia who presents today for Hypertension    Reports overall has been feeling well but will like to reduce her medications.  Not taking supplements or some of her as needed medications.  Reports she did not have trouble sleeping or significant anxiety and prefers to hold the mirtazapine.  Aware that the medication should not be stops suddenly and may experience withdrawal symptoms if decrease too fast.    She was started on Aricept bad the neurologist for changes of dementia and reports her memory has been stable.    She is followed by endocrinologist outside [facility], Dr. Redmond.  Her levothyroxine was recently decreased to 88 mcg due to abnormal labs.  Her TSH was 0.38 and free T4 was 1.9.  She is followed by ophthalmologist outside [facility] and treated with Tapeza and feels has helped her eyes.    Cardiology saw her recently and found her stable.  With her history of hyperparathyroidism recurrence of AFib was a concern and will continue Eliquis and treatments.    Has no toxic habits.  She works out with a stationary bike.  Lives with a friend.      Health Maintenance:  Health Maintenance   Topic Date Due    DEXA Scan  04/22/2024    Lipid Panel  01/08/2025    TETANUS VACCINE  11/19/2025    Hepatitis C Screening  Discontinued       Review of Systems   Constitutional: Negative.  Negative for fever and unexpected weight change.   HENT: Negative.     Respiratory: Negative.     Cardiovascular: Negative.    Gastrointestinal: Negative.    Genitourinary:  Negative for difficulty urinating.   Musculoskeletal: Negative.    Integumentary:  Negative.   Neurological: Negative.  Negative for memory loss.   Psychiatric/Behavioral: Negative.      Past Medical History:   Diagnosis Date     Hypertension        Past Surgical History:   Procedure Laterality Date    ARTHROSCOPIC REPAIR OF ROTATOR CUFF OF SHOULDER Left 5/28/2021    Procedure: REPAIR, ROTATOR CUFF, ARTHROSCOPIC, AC resection;  Surgeon: Michael Short Jr., MD;  Location: [facility] OR;  Service: Orthopedics;  Laterality: Left;  need opus system  [employer] notified 5/11/21 CC    ARTHROSCOPY OF SHOULDER WITH DECOMPRESSION OF SUBACROMIAL SPACE  5/28/2021    Procedure: ARTHROSCOPY, SHOULDER, WITH SUBACROMIAL SPACE DECOMPRESSION;  Surgeon: Michael Short Jr., MD;  Location: [facility] OR;  Service: Orthopedics;;    CHOLECYSTECTOMY      COLONOSCOPY N/A 8/29/2022    Procedure: COLONOSCOPY Suprep;  Surgeon: Prince Cummings MD;  Location: [facility] ENDO;  Service: Endoscopy;  Laterality: N/A;    HYSTERECTOMY      partial in her 40's    OOPHORECTOMY         Family History   Problem Relation Age of Onset    Hyperlipidemia Mother     Heart attack Mother     Coronary artery disease Mother     Diabetes Father     Diabetes Mellitus Father     Peripheral vascular disease Father     No Known Problems Sister     Heart disease Sister     Hypertension Sister     Hypertension Brother     Peripheral vascular disease Brother     No Known Problems Daughter     No Known Problems Son        Social History     Socioeconomic History    Marital status:    Tobacco Use    Smoking status: Never    Smokeless tobacco: Never   Substance and Sexual Activity    Alcohol use: No    Drug use: No    Sexual activity: Not Currently     Social Determinants of Health     Financial Resource Strain: Low Risk     Difficulty of Paying Living Expenses: Not very hard   Food Insecurity: No Food Insecurity    Worried About Running Out of Food in the Last Year: Never true    Ran Out of Food in the Last Year: Never true   Transportation Needs: No Transportation Needs    Lack of Transportation (Medical): No    Lack of Transportation (Non-Medical): No   Physical Activity: Sufficiently Active    Days  of Exercise per Week: 7 days    Minutes of Exercise per Session: 30 min   Stress: No Stress Concern Present    Feeling of Stress : Not at all   Social Connections: Moderately Isolated    Frequency of Communication with Friends and Family: More than three times a week    Frequency of Social Gatherings with Friends and Family: More than three times a week    Attends Jewish Services: More than 4 times per year    Active Member of Clubs or Organizations: No    Attends Club or Organization Meetings: Never    Marital Status:    Housing Stability: Low Risk     Unable to Pay for Housing in the Last Year: No    Number of Places Lived in the Last Year: 1    Unstable Housing in the Last Year: No       Current Outpatient Medications   Medication Sig Dispense Refill    cycloSPORINE (RESTASIS) 0.05 % ophthalmic emulsion Place 1 drop into both eyes 2 (two) times daily.      ELIQUIS 5 mg Tab TAKE 1 TABLET TWICE A  tablet 3    estradioL (ESTRACE) 0.01 % (0.1 mg/gram) vaginal cream Place 1 g vaginally once daily. 42.5 g 3    EUTHYROX 112 mcg tablet Take 112 mcg by mouth once daily.      finasteride (PROPECIA) 1 mg tablet Take 1 mg by mouth.      levothyroxine (SYNTHROID) 88 MCG tablet Take 88 mcg by mouth before breakfast.      methocarbamoL (ROBAXIN) 500 MG Tab Take 1 tablet (500 mg total) by mouth 2 (two) times daily as needed (spasms). 30 tablet 2    rosuvastatin (CRESTOR) 5 MG tablet Take 1 tablet (5 mg total) by mouth once daily. 90 tablet 3    amlodipine-benazepril 10-20mg (LOTREL) 10-20 mg per capsule TAKE 1 CAPSULE DAILY 90 capsule 3    cetirizine (ZYRTEC) 10 MG tablet Take 1 tablet (10 mg total) by mouth once daily. (Patient not taking: Reported on 4/3/2023) 30 tablet 2    clobetasol 0.05% (TEMOVATE) 0.05 % Oint Apply topically Daily. (Patient not taking: Reported on 4/3/2023) 45 g 1    donepeziL (ARICEPT) 5 MG tablet Take 1 tablet (5 mg total) by mouth every evening. 90 tablet 1    oxybutynin (DITROPAN-XL)  5 MG TR24 TAKE 2 TABLETS ONCE DAILY (Patient not taking: Reported on 7/6/2023) 180 tablet 3    pramoxine (SARNA SENSITIVE) 1 % Lotn Apply 1 application topically 2 (two) times a day. (Patient not taking: Reported on 4/3/2023) 222 mL 2    propylene glycoL (SYSTANE BALANCE) 0.6 % Drop Apply 2 drops to eye 2 (two) times daily. (Patient not taking: Reported on 4/3/2023) 3 Bottle 3    triamcinolone acetonide 0.1% (KENALOG) 0.1 % ointment Apply topically 2 (two) times daily as needed (itching). (Patient not taking: Reported on 4/3/2023) 30 g 2     No current facility-administered medications for this visit.     Facility-Administered Medications Ordered in Other Visits   Medication Dose Route Frequency Provider Last Rate Last Admin    simethicone 40 mg/0.6 mL drops    PRN Prince Cummings MD   40 mg at 08/29/22 0938       Review of patient's allergies indicates:   Allergen Reactions    Penicillins Hives    Hydrochlorothiazide Itching         Objective:       Last 3 sets of Vitals    Vitals - 1 value per visit 4/13/2023 7/6/2023 7/6/2023   SYSTOLIC 87 - 130   DIASTOLIC 60 - 68   Pulse 92 - 65   Temp - - -   Resp - - -   SPO2 96 - 97   Weight (lb) 148.81 - 143.52   Weight (kg) 67.5 - 65.1   Height 62 - 62   BMI (Calculated) 27.2 - 26.2   VISIT REPORT - - -   Pain Score  - 0 -   Some recent data might be hidden   Physical Exam  Constitutional:       General: She is not in acute distress.     Appearance: She is normal weight.   HENT:      Head: Normocephalic.      Right Ear: Tympanic membrane, ear canal and external ear normal.      Left Ear: Tympanic membrane, ear canal and external ear normal.      Nose: Nose normal.      Mouth/Throat:      Mouth: Mucous membranes are moist.      Pharynx: Oropharynx is clear.   Eyes:      General: No scleral icterus.        Right eye: No discharge.         Left eye: No discharge.      Extraocular Movements: Extraocular movements intact.   Neck:      Vascular: No carotid bruit.      Comments:  No goiter.  Cardiovascular:      Rate and Rhythm: Normal rate and regular rhythm.      Pulses: Normal pulses.      Heart sounds: Normal heart sounds.   Pulmonary:      Effort: Pulmonary effort is normal.      Breath sounds: Normal breath sounds.   Abdominal:      General: Bowel sounds are normal. There is no distension.      Palpations: Abdomen is soft. There is no mass.      Tenderness: There is no abdominal tenderness.   Musculoskeletal:         General: No swelling.   Lymphadenopathy:      Cervical: No cervical adenopathy.   Skin:     General: Skin is warm and dry.   Neurological:      General: No focal deficit present.      Mental Status: She is alert and oriented to person, place, and time.   Psychiatric:         Behavior: Behavior normal.      Comments: Appears worried and anxious, mildly depressed.         CBC:  Recent Labs   Lab 04/11/21  0242 04/12/21  0028 10/15/21  0755   WBC 7.63 9.78 4.53   RBC 4.94 5.09 4.50   Hemoglobin 14.7 15.1 13.0   Hematocrit 44.4 44.7 39.3   Platelets 288 302 269   MCV 90 88 87   MCH 29.8 29.7 28.9   MCHC 33.1 33.8 33.1     CMP:  Recent Labs   Lab 04/12/21  0028 04/22/21  0955 05/25/21  0849 10/15/21  0755 10/28/22  0935   Glucose 123 H 85   < > 92  --    Calcium 9.1 9.0   < > 9.0  --    Albumin 4.1 3.8  --  3.9  --    Total Protein 7.3 6.8  --  7.0  --    Sodium 142 144   < > 143  --    Potassium 4.3 3.4 L   < > 3.4 L 3.7   CO2 29 26   < > 27  --    Chloride 104 109   < > 109  --    BUN 12 12   < > 8  --    Creatinine 0.9 0.8   < > 0.8  --    Alkaline Phosphatase 101 148 H  --  110  --    ALT 13 54 H  --  14  --    AST 14 17  --  14  --    Total Bilirubin 0.4 0.4  --  0.5  --     < > = values in this interval not displayed.     URINALYSIS:  Recent Labs   Lab 08/12/22  0920   Color, UA Yellow   Specific Gravity, UA 1.020   pH, UA 6.0   Protein, UA Negative   Nitrite, UA Negative   Leukocytes, UA Negative   Urobilinogen, UA Negative      LIPIDS:  Recent Labs   Lab 04/22/21  0923  10/15/21  0755   TSH 1.780 0.466     TSH:  Recent Labs   Lab 04/22/21  0955 10/15/21  0755   TSH 1.780 0.466       A1C:  Recent Labs   Lab 04/22/21  0955 06/09/21  0000   Hemoglobin A1C 5.2 5.1       Imaging:  X-ray Shoulder 2 or More Views Right  Narrative: EXAMINATION:  XR SHOULDER COMPLETE 2 OR MORE VIEWS RIGHT    CLINICAL HISTORY:  Pain in right shoulder    TECHNIQUE:  Two or three views of the right shoulder were performed.    COMPARISON:  None    FINDINGS:  Right glenohumeral and AC joint articulations appear intact with mild DJD.  No acute fracture, dislocation, or osseous destruction.  Impression: As above.    Electronically signed by: Ritesh Antony  Date:    02/20/2023  Time:    15:03      Assessment:       1. Essential hypertension    2. Hyperthyroidism    3. Paroxysmal A-fib    4. Anxiety about health    5. Dementia without behavioral disturbance            Plan:       1. Essential hypertension  -     Comprehensive Metabolic Panel; Future; Expected date: 07/06/2023  -     Lipid Panel; Future; Expected date: 07/06/2023  -     CBC Auto Differential; Future; Expected date: 07/06/2023  - controlled.  Same treatment with Lotrel and Crestor    2. Hyperthyroidism   - follows with endocrinology and her levothyroxine was recently decreased.  Clinically stable.    3. Paroxysmal A-fib  -     CBC Auto Differential; Future; Expected date: 07/06/2023  - recent cardiology evaluation found stable and recommending to keep anticoagulation.    4. Anxiety about health   - reports mood is stable.  Wants to get of mirtazapine and reduce number of medications taken.  Weaning instructions were given.    5. Dementia without behavioral disturbance  Overview:  IMO Regualtory Update 4/1/23  - on Aricept and finds memory stable.  Appropriate during exam.      Other orders  -     (In Office Administered) Pneumococcal Conjugate Vaccine (20 Valent) (IM)       Health Maintenance Due   Topic Date Due    COVID-19 Vaccine (6 - Mixed  Product series) 06/30/2022            Return to clinic in 3-6 months.    Kamryn New MD  [facility]  Disclaimer:  This note has been generated using voice-recognition software. There may be grammatical or spelling errors that have been missed during proof-reading

## 2023-07-11 ENCOUNTER — TELEPHONE (OUTPATIENT)
Dept: FAMILY MEDICINE | Facility: CLINIC | Age: 73
End: 2023-07-11
Payer: MEDICARE

## 2023-07-11 DIAGNOSIS — Z12.31 ENCOUNTER FOR SCREENING MAMMOGRAM FOR MALIGNANT NEOPLASM OF BREAST: Primary | ICD-10-CM

## 2023-07-11 NOTE — TELEPHONE ENCOUNTER
----- Message from Christy Acosta sent at 7/11/2023  8:54 AM CDT -----  Type:  Mammogram    Caller is requesting to schedule their annual mammogram appointment.  Order is not listed in EPIC.  Please enter order and contact patient to schedule.  Name of Caller: Flor Erwin  Where would they like the mammogram performed? [facility]  Would the patient rather a call back or a response via [facility]?  call  Best Call Back Number: [phone]  Additional Information:

## 2023-07-11 NOTE — TELEPHONE ENCOUNTER
----- Message from Carey Talbot MA sent at 7/11/2023  9:12 AM CDT -----  Patient needs a mammogram placed however I tried to put it in and the Dx code was not being covered by her insurance

## 2023-07-21 ENCOUNTER — TELEPHONE (OUTPATIENT)
Dept: FAMILY MEDICINE | Facility: CLINIC | Age: 73
End: 2023-07-21
Payer: MEDICARE

## 2023-07-21 NOTE — TELEPHONE ENCOUNTER
----- Message from Semaj Johnson sent at 7/21/2023  8:36 AM CDT -----  Type:  Same Day Appointment Request    Caller is requesting a same day appointment.  Caller declined first available appointment listed below.    Name of Caller:pt  When is the first available appointment?n/a  Symptoms:shoulder pain (rt hurts more)  Best Call Back Number: [phone]  Additional Information: books were closed   Pt stated she's welcome to see someone else

## 2023-07-22 ENCOUNTER — OFFICE VISIT (OUTPATIENT)
Dept: FAMILY MEDICINE | Facility: CLINIC | Age: 73
End: 2023-07-22
Payer: MEDICARE

## 2023-07-22 VITALS
BODY MASS INDEX: 27.14 KG/M2 | HEART RATE: 70 BPM | SYSTOLIC BLOOD PRESSURE: 128 MMHG | WEIGHT: 147.5 LBS | DIASTOLIC BLOOD PRESSURE: 66 MMHG | HEIGHT: 62 IN | OXYGEN SATURATION: 97 %

## 2023-07-22 DIAGNOSIS — M62.838 MUSCLE SPASM: ICD-10-CM

## 2023-07-22 DIAGNOSIS — M25.511 CHRONIC RIGHT SHOULDER PAIN: Primary | ICD-10-CM

## 2023-07-22 DIAGNOSIS — G89.29 CHRONIC RIGHT SHOULDER PAIN: Primary | ICD-10-CM

## 2023-07-22 PROCEDURE — 99214 OFFICE O/P EST MOD 30 MIN: CPT | Mod: S$PBB,,, | Performed by: FAMILY MEDICINE

## 2023-07-22 PROCEDURE — 99214 PR OFFICE/OUTPT VISIT, EST, LEVL IV, 30-39 MIN: ICD-10-PCS | Mod: S$PBB,,, | Performed by: FAMILY MEDICINE

## 2023-07-22 PROCEDURE — 99999 PR PBB SHADOW E&M-EST. PATIENT-LVL III: CPT | Mod: PBBFAC,,, | Performed by: FAMILY MEDICINE

## 2023-07-22 PROCEDURE — 99999 PR PBB SHADOW E&M-EST. PATIENT-LVL III: ICD-10-PCS | Mod: PBBFAC,,, | Performed by: FAMILY MEDICINE

## 2023-07-22 PROCEDURE — 99213 OFFICE O/P EST LOW 20 MIN: CPT | Mod: PBBFAC,PO | Performed by: FAMILY MEDICINE

## 2023-07-22 RX ORDER — DICLOFENAC SODIUM 10 MG/G
2 GEL TOPICAL 2 TIMES DAILY
Qty: 50 G | Refills: 1 | Status: SHIPPED | OUTPATIENT
Start: 2023-07-22

## 2023-07-22 RX ORDER — IBUPROFEN 600 MG/1
600 TABLET ORAL EVERY 8 HOURS PRN
Qty: 30 TABLET | Refills: 1 | Status: SHIPPED | OUTPATIENT
Start: 2023-07-22

## 2023-07-22 RX ORDER — METHOCARBAMOL 500 MG/1
500 TABLET, FILM COATED ORAL NIGHTLY PRN
Qty: 30 TABLET | Refills: 0 | Status: SHIPPED | OUTPATIENT
Start: 2023-07-22

## 2023-07-22 NOTE — PROGRESS NOTES
(Portions of this note were dictated using voice recognition software and may contain dictation related errors in spelling/grammar/syntax not found on text review)    CC:   Chief Complaint   Patient presents with    Shoulder Pain     right       HPI: 73 y.o. female presented to Saturday urgent care clinic with shoulder pain. She has chronic medical conditions of hypertension, dementia without behavioral disturbance, paroxysmal A fib, chronic B/L shoulder pain, had surgery on left shoulder in past. Pt follows up with orthopedist (Dr Short), last visit 2/223, had x rays done which shows mild DJD, she has steroid injection in right shoulder in feb. She gets flare ups of shoulder pain from time to time, reports having right shoulder shoulder pain recently, describes as sharp pain which radiates towards upper arm, symptoms are worse at night when she cant sleep due to constant pain, no recent trauma/fall reported, no associated numbness/tingling. She took robaxin before, has been out of it, taking tylenol with little relief. She denies any other symptoms or concerns.     Past Medical History:   Diagnosis Date    Hypertension        Past Surgical History:   Procedure Laterality Date    ARTHROSCOPIC REPAIR OF ROTATOR CUFF OF SHOULDER Left 5/28/2021    Procedure: REPAIR, ROTATOR CUFF, ARTHROSCOPIC, AC resection;  Surgeon: Michael Short Jr., MD;  Location: [facility] OR;  Service: Orthopedics;  Laterality: Left;  need opus system  [employer] notified 5/11/21 CC    ARTHROSCOPY OF SHOULDER WITH DECOMPRESSION OF SUBACROMIAL SPACE  5/28/2021    Procedure: ARTHROSCOPY, SHOULDER, WITH SUBACROMIAL SPACE DECOMPRESSION;  Surgeon: Michael Short Jr., MD;  Location: [facility] OR;  Service: Orthopedics;;    CHOLECYSTECTOMY      COLONOSCOPY N/A 8/29/2022    Procedure: COLONOSCOPY Suprep;  Surgeon: Prince Cummings MD;  Location: [facility] ENDO;  Service: Endoscopy;  Laterality: N/A;    HYSTERECTOMY      partial in her 40's    OOPHORECTOMY          Family History   Problem Relation Age of Onset    Hyperlipidemia Mother     Heart attack Mother     Coronary artery disease Mother     Diabetes Father     Diabetes Mellitus Father     Peripheral vascular disease Father     No Known Problems Sister     Heart disease Sister     Hypertension Sister     Hypertension Brother     Peripheral vascular disease Brother     No Known Problems Daughter     No Known Problems Son        Social History     Tobacco Use    Smoking status: Never    Smokeless tobacco: Never   Substance Use Topics    Alcohol use: No    Drug use: No       Lab Results   Component Value Date    WBC 4.53 10/15/2021    HGB 13.0 10/15/2021    HCT 39.3 10/15/2021    MCV 87 10/15/2021     10/15/2021    CHOL 169 01/08/2020    TRIG 94 01/08/2020    HDL 43 01/08/2020    ALT 14 10/15/2021    AST 14 10/15/2021    BILITOT 0.5 10/15/2021    ALKPHOS 110 10/15/2021     10/15/2021    K 3.7 10/28/2022     10/15/2021    CREATININE 0.8 10/15/2021    ESTGFRAFRICA >60 10/15/2021    EGFRNONAA >60 10/15/2021    CALCIUM 9.0 10/15/2021    ALBUMIN 3.9 10/15/2021    BUN 8 10/15/2021    CO2 27 10/15/2021    TSH 0.466 10/15/2021    INR 1.0 12/25/2019    HGBA1C 5.1 06/09/2021    LDLCALC 107.2 01/08/2020    GLU 92 10/15/2021             Vital signs reviewed  PE:   APPEARANCE: Well nourished, well developed, in no acute distress.    HEAD: Normocephalic, atraumatic.  EYES: EOMI.  Conjunctivae noninjected.  NOSE: Mucosa pink. Airway clear.  NECK: Supple with no cervical lymphadenopathy.    CHEST: Good inspiratory effort. Lungs clear to auscultation with no wheezes or crackles.  CARDIOVASCULAR: Normal S1, S2. No rubs, murmurs, or gallops.  ABDOMEN: Bowel sounds normal. Not distended. Soft. No tenderness or masses. No organomegaly.  EXTREMITIES: No edema, cyanosis, or clubbing.  Shoulder: Rt- anterior shoulder pain reproducible with overhead activities, ROM slightly restricted, no TTP, impingement positive    Review  of Systems   Constitutional:  Negative for chills, fatigue and fever.   HENT: Negative.     Respiratory:  Negative for cough, shortness of breath and wheezing.    Cardiovascular:  Negative for chest pain, palpitations and leg swelling.   Gastrointestinal:  Negative for abdominal pain, change in bowel habit, constipation, diarrhea, nausea, vomiting, reflux and change in bowel habit.   Genitourinary: Negative.    Musculoskeletal:  Positive for arthralgias and myalgias. Negative for joint swelling.   Neurological: Negative.    Psychiatric/Behavioral: Negative.     All other systems reviewed and are negative.    IMPRESSION  1. Chronic right shoulder pain    2. Muscle spasm            PLAN      1. Chronic right shoulder pain    - ibuprofen (ADVIL,MOTRIN) 600 MG tablet; Take 1 tablet (600 mg total) by mouth every 8 (eight) hours as needed for Pain.  Dispense: 30 tablet; Refill: 1    - methocarbamoL (ROBAXIN) 500 MG Tab; Take 1 tablet (500 mg total) by mouth nightly as needed (spasms).  Dispense: 30 tablet; Refill: 0    - diclofenac sodium (VOLTAREN) 1 % Gel; Apply 2 g topically 2 (two) times daily.  Dispense: 50 g; Refill: 1    Discussed referral to PT/OT, she wants to post pone for now    Advised to follow up with Dr Short in office, might need to have joint injection        2. Muscle spasm    - methocarbamoL (ROBAXIN) 500 MG Tab; Take 1 tablet (500 mg total) by mouth nightly as needed (spasms).  Dispense: 30 tablet; Refill: 0           Age/demographic appropriate health maintenance:    Health Maintenance Due   Topic Date Due    COVID-19 Vaccine (6 - Mixed Product series) 06/30/2022             Manjeet Milian   7/22/2023

## 2023-07-25 ENCOUNTER — HOSPITAL ENCOUNTER (OUTPATIENT)
Dept: RADIOLOGY | Facility: HOSPITAL | Age: 73
Discharge: HOME | End: 2023-07-25
Attending: INTERNAL MEDICINE
Payer: MEDICARE

## 2023-07-25 DIAGNOSIS — Z12.31 ENCOUNTER FOR SCREENING MAMMOGRAM FOR MALIGNANT NEOPLASM OF BREAST: ICD-10-CM

## 2023-07-25 PROCEDURE — 77067 SCR MAMMO BI INCL CAD: CPT | Mod: TC

## 2023-07-25 PROCEDURE — 77067 MAMMO DIGITAL SCREENING BILAT WITH TOMO: ICD-10-PCS | Mod: 26,,, | Performed by: RADIOLOGY

## 2023-07-25 PROCEDURE — 77067 SCR MAMMO BI INCL CAD: CPT | Mod: 26,,, | Performed by: RADIOLOGY

## 2023-07-25 PROCEDURE — 77063 MAMMO DIGITAL SCREENING BILAT WITH TOMO: ICD-10-PCS | Mod: 26,,, | Performed by: RADIOLOGY

## 2023-07-25 PROCEDURE — 77063 BREAST TOMOSYNTHESIS BI: CPT | Mod: 26,,, | Performed by: RADIOLOGY

## 2023-08-04 ENCOUNTER — TELEPHONE (OUTPATIENT)
Dept: ORTHOPEDICS | Facility: CLINIC | Age: 73
End: 2023-08-04
Payer: MEDICARE

## 2023-08-04 NOTE — TELEPHONE ENCOUNTER
----- Message from Gabbi Reed sent at 8/4/2023 10:58 AM CDT -----  Type:  Patient Returning Call    Who Called:pt  Who Left Message for Patient:wanda  Does the patient know what this is regarding?:returning missed call  Would the patient rather a call back or a response via [employer]? call  Best Call Back Number:[phone] or [phone]  Additional Information:

## 2023-08-04 NOTE — TELEPHONE ENCOUNTER
----- Message from Christy Acosta sent at 8/4/2023  8:54 AM CDT -----  Type:  Sooner Apoointment Request    Caller is requesting a sooner appointment.  Caller declined first available appointment listed below.  Caller will not accept being placed on the waitlist and is requesting a message be sent to doctor.  Name of Caller: Pt  When is the first available appointment? 9/7/23  Symptoms: Shoulder pain/middle finger  Would the patient rather a call back or a response via [employer]?  call  Best Call Back Number: [phone]  Additional Information:

## 2023-08-07 ENCOUNTER — LAB VISIT (OUTPATIENT)
Dept: LAB | Facility: HOSPITAL | Age: 73
End: 2023-08-07
Attending: INTERNAL MEDICINE
Payer: MEDICARE

## 2023-08-07 DIAGNOSIS — I48.0 PAROXYSMAL A-FIB: ICD-10-CM

## 2023-08-07 DIAGNOSIS — I10 ESSENTIAL HYPERTENSION: ICD-10-CM

## 2023-08-07 LAB
ALBUMIN SERPL BCP-MCNC: 3.9 G/DL (ref 3.5–5.2)
ALP SERPL-CCNC: 66 U/L (ref 55–135)
ALT SERPL W/O P-5'-P-CCNC: 14 U/L (ref 10–44)
ANION GAP SERPL CALC-SCNC: 8 MMOL/L (ref 8–16)
AST SERPL-CCNC: 15 U/L (ref 10–40)
BASOPHILS # BLD AUTO: 0.03 K/UL (ref 0–0.2)
BASOPHILS NFR BLD: 0.6 % (ref 0–1.9)
BILIRUB SERPL-MCNC: 0.5 MG/DL (ref 0.1–1)
BUN SERPL-MCNC: 14 MG/DL (ref 8–23)
CALCIUM SERPL-MCNC: 9.4 MG/DL (ref 8.7–10.5)
CHLORIDE SERPL-SCNC: 110 MMOL/L (ref 95–110)
CHOLEST SERPL-MCNC: 168 MG/DL (ref 120–199)
CHOLEST/HDLC SERPL: 2.1 {RATIO} (ref 2–5)
CO2 SERPL-SCNC: 25 MMOL/L (ref 23–29)
CREAT SERPL-MCNC: 0.8 MG/DL (ref 0.5–1.4)
DIFFERENTIAL METHOD: ABNORMAL
EOSINOPHIL # BLD AUTO: 0.1 K/UL (ref 0–0.5)
EOSINOPHIL NFR BLD: 2.1 % (ref 0–8)
ERYTHROCYTE [DISTWIDTH] IN BLOOD BY AUTOMATED COUNT: 15.1 % (ref 11.5–14.5)
EST. GFR  (NO RACE VARIABLE): >60 ML/MIN/1.73 M^2
GLUCOSE SERPL-MCNC: 91 MG/DL (ref 70–110)
HCT VFR BLD AUTO: 40.2 % (ref 37–48.5)
HDLC SERPL-MCNC: 79 MG/DL (ref 40–75)
HDLC SERPL: 47 % (ref 20–50)
HGB BLD-MCNC: 12.9 G/DL (ref 12–16)
IMM GRANULOCYTES # BLD AUTO: 0.01 K/UL (ref 0–0.04)
IMM GRANULOCYTES NFR BLD AUTO: 0.2 % (ref 0–0.5)
LDLC SERPL CALC-MCNC: 76.4 MG/DL (ref 63–159)
LYMPHOCYTES # BLD AUTO: 2.6 K/UL (ref 1–4.8)
LYMPHOCYTES NFR BLD: 47.8 % (ref 18–48)
MCH RBC QN AUTO: 29.5 PG (ref 27–31)
MCHC RBC AUTO-ENTMCNC: 32.1 G/DL (ref 32–36)
MCV RBC AUTO: 92 FL (ref 82–98)
MONOCYTES # BLD AUTO: 0.5 K/UL (ref 0.3–1)
MONOCYTES NFR BLD: 9.9 % (ref 4–15)
NEUTROPHILS # BLD AUTO: 2.1 K/UL (ref 1.8–7.7)
NEUTROPHILS NFR BLD: 39.4 % (ref 38–73)
NONHDLC SERPL-MCNC: 89 MG/DL
NRBC BLD-RTO: 0 /100 WBC
PLATELET # BLD AUTO: 288 K/UL (ref 150–450)
PMV BLD AUTO: 10.3 FL (ref 9.2–12.9)
POTASSIUM SERPL-SCNC: 4.2 MMOL/L (ref 3.5–5.1)
PROT SERPL-MCNC: 7.1 G/DL (ref 6–8.4)
RBC # BLD AUTO: 4.37 M/UL (ref 4–5.4)
SODIUM SERPL-SCNC: 143 MMOL/L (ref 136–145)
TRIGL SERPL-MCNC: 63 MG/DL (ref 30–150)
WBC # BLD AUTO: 5.36 K/UL (ref 3.9–12.7)

## 2023-08-07 PROCEDURE — 80053 COMPREHEN METABOLIC PANEL: CPT | Performed by: INTERNAL MEDICINE

## 2023-08-07 PROCEDURE — 85025 COMPLETE CBC W/AUTO DIFF WBC: CPT | Performed by: INTERNAL MEDICINE

## 2023-08-07 PROCEDURE — 80061 LIPID PANEL: CPT | Performed by: INTERNAL MEDICINE

## 2023-08-07 PROCEDURE — 36415 COLL VENOUS BLD VENIPUNCTURE: CPT | Performed by: INTERNAL MEDICINE

## 2023-08-08 ENCOUNTER — PATIENT MESSAGE (OUTPATIENT)
Dept: FAMILY MEDICINE | Facility: CLINIC | Age: 73
End: 2023-08-08
Payer: MEDICARE

## 2023-08-16 ENCOUNTER — TELEPHONE (OUTPATIENT)
Dept: ORTHOPEDICS | Facility: CLINIC | Age: 73
End: 2023-08-16
Payer: MEDICARE

## 2023-08-16 NOTE — TELEPHONE ENCOUNTER
----- Message from Mariela Gee sent at 8/16/2023  7:13 AM CDT -----  Type:  Needs Medical Advice    Who Called: pt     Would the patient rather a call back or a response via [facility]? call  Best Call Back Number: [phone]  Additional Information: pt is requesting a return call from nurse robbins  to discuss some ongoing pain she is having with her right hand  middle finger

## 2023-08-21 ENCOUNTER — OFFICE VISIT (OUTPATIENT)
Dept: ORTHOPEDICS | Facility: CLINIC | Age: 73
End: 2023-08-21
Payer: MEDICARE

## 2023-08-21 VITALS — BODY MASS INDEX: 26.98 KG/M2 | HEIGHT: 62 IN

## 2023-08-21 DIAGNOSIS — M25.511 CHRONIC PAIN OF BOTH SHOULDERS: ICD-10-CM

## 2023-08-21 DIAGNOSIS — G89.29 CHRONIC RIGHT SHOULDER PAIN: Primary | ICD-10-CM

## 2023-08-21 DIAGNOSIS — M25.512 CHRONIC PAIN OF BOTH SHOULDERS: ICD-10-CM

## 2023-08-21 DIAGNOSIS — M25.511 CHRONIC RIGHT SHOULDER PAIN: Primary | ICD-10-CM

## 2023-08-21 DIAGNOSIS — G89.29 CHRONIC PAIN OF BOTH SHOULDERS: ICD-10-CM

## 2023-08-21 PROCEDURE — 99999PBSHW PR PBB SHADOW TECHNICAL ONLY FILED TO HB: Mod: PBBFAC,,,

## 2023-08-21 PROCEDURE — 99999PBSHW PR PBB SHADOW TECHNICAL ONLY FILED TO HB: ICD-10-PCS | Mod: PBBFAC,,,

## 2023-08-21 PROCEDURE — 20610 PR DRAIN/INJECT LARGE JOINT/BURSA: ICD-10-PCS | Mod: 50,S$PBB,, | Performed by: ORTHOPAEDIC SURGERY

## 2023-08-21 PROCEDURE — 99999 PR PBB SHADOW E&M-EST. PATIENT-LVL III: CPT | Mod: PBBFAC,,, | Performed by: ORTHOPAEDIC SURGERY

## 2023-08-21 PROCEDURE — 99213 OFFICE O/P EST LOW 20 MIN: CPT | Mod: PBBFAC,PN,25 | Performed by: ORTHOPAEDIC SURGERY

## 2023-08-21 PROCEDURE — 20610 DRAIN/INJ JOINT/BURSA W/O US: CPT | Mod: 50,PBBFAC,PN | Performed by: ORTHOPAEDIC SURGERY

## 2023-08-21 PROCEDURE — 99999 PR PBB SHADOW E&M-EST. PATIENT-LVL III: ICD-10-PCS | Mod: PBBFAC,,, | Performed by: ORTHOPAEDIC SURGERY

## 2023-08-21 PROCEDURE — 20610 DRAIN/INJ JOINT/BURSA W/O US: CPT | Mod: 50,S$PBB,, | Performed by: ORTHOPAEDIC SURGERY

## 2023-08-21 PROCEDURE — 99213 OFFICE O/P EST LOW 20 MIN: CPT | Mod: S$PBB,25,, | Performed by: ORTHOPAEDIC SURGERY

## 2023-08-21 PROCEDURE — 99213 PR OFFICE/OUTPT VISIT, EST, LEVL III, 20-29 MIN: ICD-10-PCS | Mod: S$PBB,25,, | Performed by: ORTHOPAEDIC SURGERY

## 2023-08-21 RX ORDER — TRIAMCINOLONE ACETONIDE 40 MG/ML
40 INJECTION, SUSPENSION INTRA-ARTICULAR; INTRAMUSCULAR
Status: COMPLETED | OUTPATIENT
Start: 2023-08-21 | End: 2023-08-21

## 2023-08-21 RX ADMIN — TRIAMCINOLONE ACETONIDE 40 MG: 40 INJECTION, SUSPENSION INTRA-ARTICULAR; INTRAMUSCULAR at 01:08

## 2023-08-21 NOTE — PROGRESS NOTES
Subjective:      Patient ID: Flor Erwin is a 73 y.o. female.  Chief Complaint: Shoulder Pain (bilateral) and Finger Pain (right middle )      HPI  Flor Erwin is a  73 y.o. female presenting today for follow up of bilateral shoulder pain.  She reports that she is still having symptoms right shoulder worse than left we have not got an MRI recently she did have surgery on the left shoulder several years ago.    Review of patient's allergies indicates:   Allergen Reactions    Penicillins Hives    Hydrochlorothiazide Itching         Current Outpatient Medications   Medication Sig Dispense Refill    amlodipine-benazepril 10-20mg (LOTREL) 10-20 mg per capsule TAKE 1 CAPSULE DAILY 90 capsule 3    cetirizine (ZYRTEC) 10 MG tablet Take 1 tablet (10 mg total) by mouth once daily. 30 tablet 2    cycloSPORINE (RESTASIS) 0.05 % ophthalmic emulsion Place 1 drop into both eyes 2 (two) times daily.      diclofenac sodium (VOLTAREN) 1 % Gel Apply 2 g topically 2 (two) times daily. 50 g 1    ELIQUIS 5 mg Tab TAKE 1 TABLET TWICE A  tablet 3    estradioL (ESTRACE) 0.01 % (0.1 mg/gram) vaginal cream Place 1 g vaginally once daily. 42.5 g 3    finasteride (PROPECIA) 1 mg tablet Take 1 mg by mouth.      ibuprofen (ADVIL,MOTRIN) 600 MG tablet Take 1 tablet (600 mg total) by mouth every 8 (eight) hours as needed for Pain. 30 tablet 1    levothyroxine (SYNTHROID) 88 MCG tablet Take 88 mcg by mouth before breakfast.      methocarbamoL (ROBAXIN) 500 MG Tab Take 1 tablet (500 mg total) by mouth nightly as needed (spasms). 30 tablet 0    oxybutynin (DITROPAN-XL) 5 MG TR24 TAKE 2 TABLETS ONCE DAILY 180 tablet 3    pramoxine (SARNA SENSITIVE) 1 % Lotn Apply 1 application topically 2 (two) times a day. 222 mL 2    propylene glycoL (SYSTANE BALANCE) 0.6 % Drop Apply 2 drops to eye 2 (two) times daily. 3 Bottle 3    rosuvastatin (CRESTOR) 5 MG tablet Take 1 tablet (5 mg total) by mouth once daily. 90 tablet 3    triamcinolone  "acetonide 0.1% (KENALOG) 0.1 % ointment Apply topically 2 (two) times daily as needed (itching). 30 g 2    clobetasol 0.05% (TEMOVATE) 0.05 % Oint Apply topically Daily. (Patient not taking: Reported on 4/3/2023) 45 g 1    donepeziL (ARICEPT) 5 MG tablet Take 1 tablet (5 mg total) by mouth every evening. 90 tablet 1     No current facility-administered medications for this visit.     Facility-Administered Medications Ordered in Other Visits   Medication Dose Route Frequency Provider Last Rate Last Admin    simethicone 40 mg/0.6 mL drops    PRN Prince Cummings MD   40 mg at 08/29/22 0938       Past Medical History:   Diagnosis Date    Hypertension        Past Surgical History:   Procedure Laterality Date    ARTHROSCOPIC REPAIR OF ROTATOR CUFF OF SHOULDER Left 5/28/2021    Procedure: REPAIR, ROTATOR CUFF, ARTHROSCOPIC, AC resection;  Surgeon: Michael Short Jr., MD;  Location: [facility] OR;  Service: Orthopedics;  Laterality: Left;  need opus system  [employer] notified 5/11/21 CC    ARTHROSCOPY OF SHOULDER WITH DECOMPRESSION OF SUBACROMIAL SPACE  5/28/2021    Procedure: ARTHROSCOPY, SHOULDER, WITH SUBACROMIAL SPACE DECOMPRESSION;  Surgeon: Michael Short Jr., MD;  Location: [facility] OR;  Service: Orthopedics;;    CHOLECYSTECTOMY      COLONOSCOPY N/A 8/29/2022    Procedure: COLONOSCOPY Suprep;  Surgeon: Prince Cummings MD;  Location: [facility];  Service: Endoscopy;  Laterality: N/A;    HYSTERECTOMY      partial in her 40's    OOPHORECTOMY         OBJECTIVE:   PHYSICAL EXAM:  Height: 5' 2" (157.5 cm)    Vitals:    08/21/23 1308   Height: 5' 2" (1.575 m)   PainSc: 10-Worst pain ever   PainLoc: Arm     Ortho/SPM Exam  Examination of the shoulders both shoulders have pretty good range of motion positive impingement sign on the right  No instability slight weakness of the rotator cuff on the right neurologic exam intact    RADIOGRAPHS:  None  Comments: I have personally reviewed the imaging and I agree with the above " radiologist's report.    ASSESSMENT/PLAN:     IMPRESSION:  1. Bilateral shoulder pain with impingement.      2. Possible rotator cuff tear right shoulder    PLAN:  I have ordered MRI scan right shoulder   She would like injections for both shoulders today   After pause for time-out identified each shoulder injected bilaterally with combination Kenalog 40 mg 2 cc xylocaine sterile technique   She tolerated the procedure well without complication  Continue Advil or Tylenol for pain follow-up after the MRI is complete    FOLLOW UP:  After the MRI    Disclaimer: This note has been generated using voice-recognition software. There may be typographical errors that have been missed during proof-reading.

## 2023-09-06 ENCOUNTER — PATIENT MESSAGE (OUTPATIENT)
Dept: ADMINISTRATIVE | Facility: OTHER | Age: 73
End: 2023-09-06
Payer: MEDICARE

## 2023-09-20 DIAGNOSIS — I48.0 PAROXYSMAL A-FIB: ICD-10-CM

## 2023-10-20 ENCOUNTER — LAB VISIT (OUTPATIENT)
Dept: LAB | Facility: HOSPITAL | Age: 73
End: 2023-10-20
Payer: MEDICARE

## 2023-10-20 DIAGNOSIS — I10 ESSENTIAL HYPERTENSION, MALIGNANT: ICD-10-CM

## 2023-10-20 DIAGNOSIS — R73.09 OTHER ABNORMAL GLUCOSE: ICD-10-CM

## 2023-10-20 DIAGNOSIS — E06.3 CHRONIC LYMPHOCYTIC THYROIDITIS: ICD-10-CM

## 2023-10-20 DIAGNOSIS — E89.0 POSTSURGICAL HYPOTHYROIDISM: Primary | ICD-10-CM

## 2023-10-20 LAB
ALBUMIN SERPL BCP-MCNC: 3.7 G/DL (ref 3.5–5.2)
ALP SERPL-CCNC: 77 U/L (ref 55–135)
ALT SERPL W/O P-5'-P-CCNC: 16 U/L (ref 10–44)
ANION GAP SERPL CALC-SCNC: 9 MMOL/L (ref 8–16)
AST SERPL-CCNC: 19 U/L (ref 10–40)
BILIRUB SERPL-MCNC: 0.4 MG/DL (ref 0.1–1)
BUN SERPL-MCNC: 12 MG/DL (ref 8–23)
CALCIUM SERPL-MCNC: 9.2 MG/DL (ref 8.7–10.5)
CHLORIDE SERPL-SCNC: 109 MMOL/L (ref 95–110)
CO2 SERPL-SCNC: 26 MMOL/L (ref 23–29)
CREAT SERPL-MCNC: 0.9 MG/DL (ref 0.5–1.4)
EST. GFR  (NO RACE VARIABLE): >60 ML/MIN/1.73 M^2
GLUCOSE SERPL-MCNC: 80 MG/DL (ref 70–110)
POTASSIUM SERPL-SCNC: 3.9 MMOL/L (ref 3.5–5.1)
PROT SERPL-MCNC: 6.9 G/DL (ref 6–8.4)
SODIUM SERPL-SCNC: 144 MMOL/L (ref 136–145)
T4 FREE SERPL-MCNC: 1.2 NG/DL (ref 0.71–1.51)
TSH SERPL DL<=0.005 MIU/L-ACNC: 5.32 UIU/ML (ref 0.4–4)

## 2023-10-20 PROCEDURE — 84439 ASSAY OF FREE THYROXINE: CPT | Performed by: INTERNAL MEDICINE

## 2023-10-20 PROCEDURE — 80053 COMPREHEN METABOLIC PANEL: CPT | Performed by: INTERNAL MEDICINE

## 2023-10-20 PROCEDURE — 84443 ASSAY THYROID STIM HORMONE: CPT | Performed by: INTERNAL MEDICINE

## 2023-10-20 PROCEDURE — 84445 ASSAY OF TSI GLOBULIN: CPT | Performed by: INTERNAL MEDICINE

## 2023-10-23 LAB — TSI SER-ACNC: 0.51 IU/L

## 2023-11-17 ENCOUNTER — OFFICE VISIT (OUTPATIENT)
Dept: FAMILY MEDICINE | Facility: CLINIC | Age: 73
End: 2023-11-17
Payer: MEDICARE

## 2023-11-17 VITALS
HEART RATE: 83 BPM | SYSTOLIC BLOOD PRESSURE: 130 MMHG | DIASTOLIC BLOOD PRESSURE: 70 MMHG | OXYGEN SATURATION: 98 % | WEIGHT: 158.31 LBS | HEIGHT: 62 IN | BODY MASS INDEX: 29.13 KG/M2 | TEMPERATURE: 99 F

## 2023-11-17 DIAGNOSIS — U07.1 COVID-19 VIRUS DETECTED: ICD-10-CM

## 2023-11-17 DIAGNOSIS — U07.1 COVID-19: Primary | ICD-10-CM

## 2023-11-17 DIAGNOSIS — L29.9 ITCHING: ICD-10-CM

## 2023-11-17 DIAGNOSIS — R35.89 POLYURIA: ICD-10-CM

## 2023-11-17 DIAGNOSIS — R05.9 COUGH, UNSPECIFIED TYPE: ICD-10-CM

## 2023-11-17 LAB
CTP QC/QA: YES
CTP QC/QA: YES
POC MOLECULAR INFLUENZA A AGN: NEGATIVE
POC MOLECULAR INFLUENZA B AGN: NEGATIVE
SARS-COV-2 RDRP RESP QL NAA+PROBE: POSITIVE

## 2023-11-17 PROCEDURE — 99999 PR PBB SHADOW E&M-EST. PATIENT-LVL V: ICD-10-PCS | Mod: PBBFAC,,, | Performed by: NURSE PRACTITIONER

## 2023-11-17 PROCEDURE — 99214 OFFICE O/P EST MOD 30 MIN: CPT | Mod: S$PBB,,, | Performed by: NURSE PRACTITIONER

## 2023-11-17 PROCEDURE — 99999PBSHW: ICD-10-PCS | Mod: PBBFAC,,,

## 2023-11-17 PROCEDURE — 87635 SARS-COV-2 COVID-19 AMP PRB: CPT | Mod: PBBFAC,PO | Performed by: NURSE PRACTITIONER

## 2023-11-17 PROCEDURE — 99999 PR PBB SHADOW E&M-EST. PATIENT-LVL V: CPT | Mod: PBBFAC,,, | Performed by: NURSE PRACTITIONER

## 2023-11-17 PROCEDURE — 99214 PR OFFICE/OUTPT VISIT, EST, LEVL IV, 30-39 MIN: ICD-10-PCS | Mod: S$PBB,,, | Performed by: NURSE PRACTITIONER

## 2023-11-17 PROCEDURE — 87502 INFLUENZA DNA AMP PROBE: CPT | Mod: PBBFAC,PO | Performed by: NURSE PRACTITIONER

## 2023-11-17 PROCEDURE — 99999PBSHW POCT INFLUENZA A/B MOLECULAR: Mod: PBBFAC,,,

## 2023-11-17 PROCEDURE — 99999PBSHW: Mod: PBBFAC,,,

## 2023-11-17 PROCEDURE — 99215 OFFICE O/P EST HI 40 MIN: CPT | Mod: PBBFAC,PO | Performed by: NURSE PRACTITIONER

## 2023-11-17 RX ORDER — OXYBUTYNIN CHLORIDE 5 MG/1
10 TABLET, EXTENDED RELEASE ORAL DAILY
Qty: 180 TABLET | Refills: 3 | Status: SHIPPED | OUTPATIENT
Start: 2023-11-17 | End: 2024-02-06

## 2023-11-17 RX ORDER — TRIAMCINOLONE ACETONIDE 1 MG/G
OINTMENT TOPICAL 2 TIMES DAILY PRN
Qty: 30 G | Refills: 2 | Status: SHIPPED | OUTPATIENT
Start: 2023-11-17

## 2023-11-17 RX ORDER — FLUTICASONE PROPIONATE 50 MCG
1 SPRAY, SUSPENSION (ML) NASAL DAILY
Qty: 16 G | Refills: 1 | Status: SHIPPED | OUTPATIENT
Start: 2023-11-17

## 2023-11-17 RX ORDER — CETIRIZINE HYDROCHLORIDE 10 MG/1
10 TABLET ORAL DAILY
Qty: 30 TABLET | Refills: 2 | Status: SHIPPED | OUTPATIENT
Start: 2023-11-17 | End: 2024-11-16

## 2023-11-17 NOTE — TELEPHONE ENCOUNTER
No care due was identified.  [facility] Embedded Care Due Messages. Reference number: [id number].   11/17/2023 7:53:17 AM CST

## 2023-11-17 NOTE — PROGRESS NOTES
Subjective     Patient ID: Flor Erwin is a 73 y.o. female.    Chief Complaint: Cough, Nasal Congestion, and Headache    This is a pleasant 72 yo female patient of Dr. New who is new to me. She presents today with flu-like symptoms. PMH includes    Patient Active Problem List:     Paroxysmal A-fib     Hyperthyroidism     Essential hypertension     Polyuria 2/2 hyperthyroidism     Normocytic anemia     Pruritus     Nonrheumatic aortic valve insufficiency     Thyroid eye disease     Refractive error     Cortical cataract of both eyes     Nuclear sclerosis of both eyes     Dry eye syndrome of both eyes     Nontraumatic complete tear of left rotator cuff     Pain of left upper extremity     Weakness     Stiffness due to immobility     Dementia without behavioral disturbance, unspecified dementia type     Osteoarthritis of knee     Overactive bladder     Chronic pruritus     Arthritis of finger     Chronic pain of both shoulders    VSS today. Reports she started experiencing symptoms listed below x 4 days ago. Denies fever, chills, chest pain, SOB. Has tried using DayQuil/Nyquil that offers mild relief. Was around her friend who was experiencing similar symptoms, otherwise no sick contacts. Does not smoke. Has tried increasing fluid intake. No other issues or complaints.      Review of Systems   HENT:  Positive for nasal congestion, postnasal drip, rhinorrhea and sore throat.    Respiratory:  Positive for cough (productive).           Objective     Physical Exam  Vitals reviewed.   Constitutional:       General: She is not in acute distress.     Appearance: Normal appearance. She is well-developed and well-groomed.   HENT:      Head: Normocephalic and atraumatic.      Right Ear: Tympanic membrane, ear canal and external ear normal.      Left Ear: Tympanic membrane, ear canal and external ear normal.      Nose:      Right Turbinates: Enlarged.      Left Turbinates: Enlarged.      Mouth/Throat:      Lips: Pink.       Mouth: Mucous membranes are moist.      Pharynx: Uvula midline. No posterior oropharyngeal erythema.      Comments: Postnasal drip visualized  Eyes:      General:         Right eye: No discharge.         Left eye: No discharge.   Cardiovascular:      Rate and Rhythm: Normal rate and regular rhythm.      Heart sounds: No murmur heard.  Pulmonary:      Effort: Pulmonary effort is normal. No respiratory distress.      Breath sounds: Normal breath sounds. No wheezing or rhonchi.   Abdominal:      General: There is no distension.   Skin:     Coloration: Skin is not pale.   Neurological:      Mental Status: She is alert and oriented to person, place, and time.      Coordination: Coordination normal.   Psychiatric:         Attention and Perception: Attention normal.         Mood and Affect: Mood and affect normal.         Speech: Speech normal.         Behavior: Behavior normal. Behavior is cooperative.         Thought Content: Thought content normal.            Assessment and Plan     1. COVID-19    2. Cough, unspecified type  -     POCT COVID-19 Rapid Screening  -     POCT Influenza A/B Molecular        - Rapid flu and COVID testing done today: flu negative, COVID positive  - Continue with symptomatic relief treatments  - Strict handwashing  - Discussed isolation recommendations and discussed criteria to d/c home isolation  - Drink plenty fluids and eat as tolerated  - Hot fluids and humidifier may help  - Warning signs discussed  - RTC as needed         Follow up if symptoms worsen or fail to improve.            I spent a total of 30 minutes on the day of the visit.  This includes face to face time and non-face to face time preparing to see the patient (eg, review of tests), obtaining and/or reviewing separately obtained history, documenting clinical information in the electronic or other health record, independently interpreting results and communicating results to the patient/family/caregiver, or care  coordinator.

## 2023-11-20 ENCOUNTER — PATIENT MESSAGE (OUTPATIENT)
Dept: RESEARCH | Facility: HOSPITAL | Age: 73
End: 2023-11-20
Payer: MEDICARE

## 2024-01-26 DIAGNOSIS — I10 ESSENTIAL HYPERTENSION: ICD-10-CM

## 2024-01-26 NOTE — TELEPHONE ENCOUNTER
No care due was identified.  [facility] Embedded Care Due Messages. Reference number: [id number].   1/26/2024 5:37:22 PM CST

## 2024-01-27 RX ORDER — AMLODIPINE AND BENAZEPRIL HYDROCHLORIDE 10; 20 MG/1; MG/1
CAPSULE ORAL
Qty: 90 CAPSULE | Refills: 1 | Status: SHIPPED | OUTPATIENT
Start: 2024-01-27 | End: 2024-02-05 | Stop reason: SDUPTHER

## 2024-01-28 RX ORDER — CLOBETASOL PROPIONATE 0.5 MG/G
OINTMENT TOPICAL
Refills: 0 | OUTPATIENT
Start: 2024-01-28

## 2024-01-28 NOTE — TELEPHONE ENCOUNTER
Refill Decision Note   Flor Erwin  is requesting a refill authorization.  Brief Assessment and Rationale for Refill:  Quick Discontinue     Medication Therapy Plan:  No sig   Pharmacy is requesting new scripts for the following medications without required information, (sig/ frequency/qty/etc)      Medication Reconciliation Completed: No     Comments: Pharmacies have been requesting medications for patients without required information, (sig, frequency, qty, etc.). In addition, requests are sent for medication(s) pt. are currently not taking, and medications patients have never taken.    We have spoken to the pharmacies about these request types and advised their teams previously that we are unable to assess these New Script requests and require all details for these requests. This is a known issue and has been reported.     Note composed:3:15 AM 01/28/2024

## 2024-01-28 NOTE — TELEPHONE ENCOUNTER
Refill Decision Note   Flor Yaima  is requesting a refill authorization.  Brief Assessment and Rationale for Refill:  Approve     Medication Therapy Plan:         Comments:     Note composed:7:49 PM 01/27/2024

## 2024-02-05 ENCOUNTER — OFFICE VISIT (OUTPATIENT)
Dept: FAMILY MEDICINE | Facility: CLINIC | Age: 74
End: 2024-02-05
Payer: MEDICARE

## 2024-02-05 VITALS
SYSTOLIC BLOOD PRESSURE: 136 MMHG | DIASTOLIC BLOOD PRESSURE: 84 MMHG | BODY MASS INDEX: 30.39 KG/M2 | OXYGEN SATURATION: 97 % | WEIGHT: 165.13 LBS | HEART RATE: 71 BPM | HEIGHT: 62 IN

## 2024-02-05 DIAGNOSIS — M25.552 LEFT HIP PAIN: ICD-10-CM

## 2024-02-05 DIAGNOSIS — N95.2 ATROPHIC VAGINITIS: ICD-10-CM

## 2024-02-05 DIAGNOSIS — F03.90 DEMENTIA WITHOUT BEHAVIORAL DISTURBANCE: ICD-10-CM

## 2024-02-05 DIAGNOSIS — I70.0 AORTIC ATHEROSCLEROSIS: ICD-10-CM

## 2024-02-05 DIAGNOSIS — N32.81 OVERACTIVE BLADDER: ICD-10-CM

## 2024-02-05 DIAGNOSIS — Z78.0 ASYMPTOMATIC POSTMENOPAUSAL STATE: ICD-10-CM

## 2024-02-05 DIAGNOSIS — I10 ESSENTIAL HYPERTENSION: ICD-10-CM

## 2024-02-05 DIAGNOSIS — I48.0 PAROXYSMAL A-FIB: ICD-10-CM

## 2024-02-05 DIAGNOSIS — Z00.00 ROUTINE GENERAL MEDICAL EXAMINATION AT A HEALTH CARE FACILITY: ICD-10-CM

## 2024-02-05 DIAGNOSIS — Z76.89 ENCOUNTER TO ESTABLISH CARE WITH NEW DOCTOR: Primary | ICD-10-CM

## 2024-02-05 PROCEDURE — 99999 PR PBB SHADOW E&M-EST. PATIENT-LVL IV: CPT | Mod: PBBFAC,,, | Performed by: FAMILY MEDICINE

## 2024-02-05 PROCEDURE — 99214 OFFICE O/P EST MOD 30 MIN: CPT | Mod: S$PBB,,, | Performed by: FAMILY MEDICINE

## 2024-02-05 PROCEDURE — 99214 OFFICE O/P EST MOD 30 MIN: CPT | Mod: PBBFAC,PO | Performed by: FAMILY MEDICINE

## 2024-02-05 RX ORDER — AMLODIPINE AND BENAZEPRIL HYDROCHLORIDE 10; 20 MG/1; MG/1
1 CAPSULE ORAL DAILY
Qty: 90 CAPSULE | Refills: 2 | Status: SHIPPED | OUTPATIENT
Start: 2024-02-05

## 2024-02-05 RX ORDER — CLOBETASOL PROPIONATE 0.5 MG/G
OINTMENT TOPICAL DAILY
Qty: 45 G | Refills: 1 | Status: SHIPPED | OUTPATIENT
Start: 2024-02-05 | End: 2024-06-12

## 2024-02-05 RX ORDER — ESTRADIOL 0.1 MG/G
1 CREAM VAGINAL DAILY
Qty: 42.5 G | Refills: 3 | Status: CANCELLED | OUTPATIENT
Start: 2024-02-05

## 2024-02-05 NOTE — PROGRESS NOTES
(Portions of this note were dictated using voice recognition software and may contain dictation related errors in spelling/grammar/syntax not found on text review)    CC:   Chief Complaint   Patient presents with    [facility]     Due for DEXA     Vaginitis       HPI: 73 y.o. female presented to [facility] as a new patient.  She has medical history significant for essential hypertension, paroxysmal atrial fibrillation, aortic atherosclerosis, dementia without behavioral disturbance, hypothyroidism.    HTN:  she takes Lotrel 10 20 mg daily, blood pressure has been stable, needs medication refills    Dementia:  She is followed by Neurology (Dr. Ariel Barrow), takes Aricept every day, symptoms are stable    A Fib:  She takes Eliquis 5 mg b.i.d., followed by Cardiology    Overactive bladder:  She takes Ditropan every day, symptoms are stable    Hypothyroidism:  She is on Synthroid 88 mcg    Patient has left hip pain ongoing, recently exacerbated the symptoms, denies having any trauma/fall, takes ibuprofen as needed, never had physical therapy with the same issue.    She has concerns about vaginal itching which is ongoing for the past 2-3 years, she had visit with a gynecologist in the past and was diagnosed with lichen simplex and atrophic vaginitis, was given prescription for clobetasol and estrogen cream which was effective, out of medication needs refills    She is due for annual labs.    She is due for DEXA scan.      She does not smoke, has no toxic habits.    Past Medical History:   Diagnosis Date    Hypertension        Past Surgical History:   Procedure Laterality Date    ARTHROSCOPIC REPAIR OF ROTATOR CUFF OF SHOULDER Left 5/28/2021    Procedure: REPAIR, ROTATOR CUFF, ARTHROSCOPIC, AC resection;  Surgeon: Michael Short Jr., MD;  Location: [facility];  Service: Orthopedics;  Laterality: Left;  need opus system  Ahsan notified 5/11/21 CC    ARTHROSCOPY OF SHOULDER WITH DECOMPRESSION OF SUBACROMIAL SPACE   5/28/2021    Procedure: ARTHROSCOPY, SHOULDER, WITH SUBACROMIAL SPACE DECOMPRESSION;  Surgeon: Michael Short Jr., MD;  Location: [facility] OR;  Service: Orthopedics;;    CHOLECYSTECTOMY      COLONOSCOPY N/A 8/29/2022    Procedure: COLONOSCOPY Suprep;  Surgeon: Prince Cummings MD;  Location: [facility] ENDO;  Service: Endoscopy;  Laterality: N/A;    HYSTERECTOMY      partial in her 40's    OOPHORECTOMY         Family History   Problem Relation Age of Onset    Hyperlipidemia Mother     Heart attack Mother     Coronary artery disease Mother     Diabetes Father     Diabetes Mellitus Father     Peripheral vascular disease Father     No Known Problems Sister     Heart disease Sister     Hypertension Sister     Hypertension Brother     Peripheral vascular disease Brother     No Known Problems Daughter     No Known Problems Son        Social History     Tobacco Use    Smoking status: Never    Smokeless tobacco: Never   Substance Use Topics    Alcohol use: No    Drug use: No       Lab Results   Component Value Date    WBC 5.36 08/07/2023    HGB 12.9 08/07/2023    HCT 40.2 08/07/2023    MCV 92 08/07/2023     08/07/2023    CHOL 168 08/07/2023    TRIG 63 08/07/2023    HDL 79 (H) 08/07/2023    ALT 16 10/20/2023    AST 19 10/20/2023    BILITOT 0.4 10/20/2023    ALKPHOS 77 10/20/2023     10/20/2023    K 3.9 10/20/2023     10/20/2023    CREATININE 0.9 10/20/2023    ESTGFRAFRICA >60 10/15/2021    EGFRNONAA >60 10/15/2021    CALCIUM 9.2 10/20/2023    ALBUMIN 3.7 10/20/2023    BUN 12 10/20/2023    CO2 26 10/20/2023    TSH 5.323 (H) 10/20/2023    INR 1.0 12/25/2019    HGBA1C 5.1 06/09/2021    LDLCALC 76.4 08/07/2023    GLU 80 10/20/2023             Vital signs reviewed  PE:   APPEARANCE: Well nourished, well developed, in no acute distress.    HEAD: Normocephalic, atraumatic.  EYES: EOMI.  Conjunctivae noninjected.  NOSE: Mucosa pink. Airway clear.  MOUTH & THROAT: No tonsillar enlargement. No pharyngeal erythema or exudate.    NECK: Supple with no cervical lymphadenopathy.    CHEST: Good inspiratory effort. Lungs clear to auscultation with no wheezes or crackles.  CARDIOVASCULAR: Normal S1, S2. No rubs, murmurs, or gallops.  ABDOMEN: Bowel sounds normal. Not distended. Soft. No tenderness or masses. No organomegaly.  EXTREMITIES: No edema, cyanosis, or clubbing.    Review of Systems   Constitutional:  Negative for chills, fatigue and fever.   HENT: Negative.     Respiratory:  Negative for cough, shortness of breath and wheezing.    Cardiovascular:  Negative for chest pain, palpitations and leg swelling.   Gastrointestinal: Negative.    Musculoskeletal:  Positive for arthralgias.   Neurological: Negative.    Psychiatric/Behavioral: Negative.     All other systems reviewed and are negative.      IMPRESSION  1. Encounter to establish care with new doctor    2. Aortic atherosclerosis    3. Dementia without behavioral disturbance    4. Paroxysmal A-fib    5. Routine general medical examination at a health care facility    6. Essential hypertension    7. Overactive bladder    8. Asymptomatic postmenopausal state    9. Left hip pain    10. Acute cystitis without hematuria    11. Atrophic vaginitis            PLAN      1. Aortic atherosclerosis    Noted, on statin    - Lipid Panel; Future      2. Dementia without behavioral disturbance    Stable   Continue Aricept   Followed by Neurology      3. Paroxysmal A-fib    Stable   Continue Eliquis      4. Routine general medical examination at a health care facility    - Lipid Panel; Future  - CBC Auto Differential; Future  - Comprehensive Metabolic Panel; Future      5. Essential hypertension    - CBC Auto Differential; Future    - amlodipine-benazepril 10-20mg (LOTREL) 10-20 mg per capsule; Take 1 capsule by mouth once daily.  Dispense: 90 capsule; Refill: 2      6. Overactive bladder    Continue Ditropan      7. Asymptomatic postmenopausal state    - DXA Bone Density Axial Skeleton 1 or more sites;  Future      8. Left hip pain    - Ambulatory referral/consult to Physical/Occupational Therapy; Future        9. Encounter to establish care with new doctor        10. Atrophic vaginitis    - clobetasol 0.05% (TEMOVATE) 0.05 % Oint; Apply topically Daily.  Dispense: 45 g; Refill: 1         SCREENINGS      Immunizations:   Up-to-date with flu, tetanus and COVID vaccines      Age/demographic appropriate health maintenance:    Up-to-date with colonoscopy  DEXA scan order to schedule an April      Health Maintenance Due   Topic Date Due    RSV Vaccine (Age 60+ and Pregnant patients) (1 - 1-dose 60+ series) Never done    COVID-19 Vaccine (6 - 2023-24 season) 09/01/2023    DEXA Scan  04/22/2024           Spent adequate time in obtaining history and explaining differentials     35 minutes spent during this visit of which greater than 50% devoted to face-face counseling and coordination of care regarding diagnosis and management plan       Follow-up 6 months      Manjeet Milian   2/5/2024

## 2024-02-06 DIAGNOSIS — R35.89 POLYURIA: ICD-10-CM

## 2024-02-06 RX ORDER — OXYBUTYNIN CHLORIDE 5 MG/1
10 TABLET, EXTENDED RELEASE ORAL
Qty: 180 TABLET | Refills: 3 | Status: SHIPPED | OUTPATIENT
Start: 2024-02-06

## 2024-02-08 ENCOUNTER — HOSPITAL ENCOUNTER (OUTPATIENT)
Dept: RADIOLOGY | Facility: HOSPITAL | Age: 74
Discharge: HOME | End: 2024-02-08
Attending: FAMILY MEDICINE
Payer: MEDICARE

## 2024-02-08 DIAGNOSIS — Z78.0 ASYMPTOMATIC POSTMENOPAUSAL STATE: ICD-10-CM

## 2024-02-08 PROCEDURE — 77080 DXA BONE DENSITY AXIAL: CPT | Mod: TC

## 2024-02-08 PROCEDURE — 77080 DXA BONE DENSITY AXIAL: CPT | Mod: 26,,, | Performed by: RADIOLOGY

## 2024-02-16 ENCOUNTER — CLINICAL SUPPORT (OUTPATIENT)
Dept: REHABILITATION | Facility: HOSPITAL | Age: 74
End: 2024-02-16
Attending: FAMILY MEDICINE
Payer: MEDICARE

## 2024-02-16 DIAGNOSIS — M25.552 LEFT HIP PAIN: ICD-10-CM

## 2024-02-16 DIAGNOSIS — R29.898 DECREASED STRENGTH OF LOWER EXTREMITY: ICD-10-CM

## 2024-02-16 DIAGNOSIS — M25.652 DECREASED RANGE OF LEFT HIP MOVEMENT: Primary | ICD-10-CM

## 2024-02-16 PROCEDURE — 97110 THERAPEUTIC EXERCISES: CPT | Mod: PN

## 2024-02-16 PROCEDURE — 97161 PT EVAL LOW COMPLEX 20 MIN: CPT | Mod: PN

## 2024-02-16 NOTE — PLAN OF CARE
[facility]  Physical Therapy Initial Evaluation    Name: Flor Erwin  Clinic Number: [id number]    Therapy Diagnosis:   Encounter Diagnoses   Name Primary?    Left hip pain     Decreased range of left hip movement Yes    Decreased strength of lower extremity      Physician: Manjeet Milian MD    Physician Orders: PT Eval and Treat   Medical Diagnosis from Referral: M25.552 (ICD-10-CM) - Left hip pain   Evaluation Date: 2/16/2024  Authorization Period Expiration: 2/4/25  Plan of Care Expiration: 4/12/24  Visit # / Visits authorized: 1/ 1  FOTO: 1/10    Time In: 8:00  Time Out: 8:55  Total Billable Time: 55 minutes (low Complexity Evaluation, Therapeutic Exercise - 1)    Precautions: Standard, hypertension, afib, dementia    Subjective   Date of onset: several weeks  History of current condition - Flor reports: new onset of hip pain stating woke up with it worsening. Left hip pain worse in the morning which eases up in about 30 minutes, pain with raising from chair, walking around 10-15 minutes, interferes with sleep, bathing. no pain with cooking/ cleaning. She has a bike which she uses daily for 30 min to an hour. Does not use assistive device. Denies falls. Occasionally has tingling in left leg to lateral calf as well. Reports this issue is separate from hip pain and has been going on prior to onset. She has also been having right shoulder pain which is 5/10 pain and does limit activity which she wishes to address after hip.      Medical History:   Past Medical History:   Diagnosis Date    Hypertension        Surgical History:   Flor Erwin  has a past surgical history that includes Cholecystectomy; Oophorectomy; Hysterectomy; Arthroscopic repair of rotator cuff of shoulder (Left, 5/28/2021); Arthroscopy of shoulder with decompression of subacromial space (5/28/2021); and Colonoscopy (N/A, 8/29/2022).    Medications:   Flor has a current medication list which includes the  following prescription(s): amlodipine-benazepril 10-20mg, apixaban, cetirizine, clobetasol 0.05%, cyclosporine, diclofenac sodium, donepezil, estradiol, finasteride, fluticasone propionate, ibuprofen, levothyroxine, methocarbamol, oxybutynin, pramoxine, systane balance, rosuvastatin, and triamcinolone acetonide 0.1%, and the following Facility-Administered Medications: simethicone.    Allergies:   Review of patient's allergies indicates:   Allergen Reactions    Penicillins Hives    Hydrochlorothiazide Itching        Imaging, none    Prior Therapy: PT for knees   Social History:  lives with friend   Occupation: retired   Prior Level of Function: independent with ADLs, bikes 30 min- hr daily   Current Level of Function: pain with above activities     Pain:   Current 5/10, worst 7/10, best 2/10   Location: left hip   Description: Aching, stiff   Aggravating Factors: Standing, Bending, Walking, Morning, and Getting out of bed/chair  Easing Factors: pain medication and hot bath, bike     Pts goals: decreased pain to be able to do daily activities     Objective     Posture: left hip elevated   Palpation: generalized tenderness to hip including left greater trochanter, gluteals  Sensation: normal light touch   DTRs: not tested     Range of Motion/Strength:   Lumbar flexion: hip hinge, knees flexed 75% with mild hip pain ( discordant)  Lumbar extension 25% mild hip pain (discordant)   Hip Right Left Pain/Dysfunction with Movement   AROM/PROM      flexion  95  85*    extension  Not tested   Not tested     abduction  Within normal limits  Within normal limits min pain     Internal rotation 25  35*    External rotation  30  30        L/E MMT Right Left Pain/Dysfunction with Movement   Hip Flexion 4+/5 4-/5*    Hip Extension 4-/5 4-/5    Hip Abduction 4-/5 4-/5    Hip Adduction 5/5 5/5    Hip IR 5/5 4+/5    Hip ER 4+/5 4+/5    Knee Flexion 4+/5 4/5    Knee Extension 5/5 5/5    Ankle DF 5/5 5/5    Ankle PF 4/5 4/5      Joint  "Mobility:   Hip: gross hypomobility     Flexibility: hamstring 50% straight leg raise     Hip Special Tests:  FADIR = not tested   FELECIA = +  Scour/quadrant Test = +  [place]= not tested   Anterior groin pain=+     Hip OA cluster: x-ray gold standard  1) Self reported squatting as aggravating factor: +  2) Active hip flexion causing lateral hip/groin pain: +   3) Scour test with adduction causing lateral hip/groin pain: +  4) Active hip extension causes pain: +  5) Passive hip IR < 25 degrees: -  *4/5 (+) LR: 24.3, increases posttest probability to 91%    Gait Analysis:Without AD Assistance ind Deviations: decreased step length    TU.77 seconds   TUG Cutoff Scores:13.5        30 second sit-to-stand test (without U/E support): 11 moderate valgus     30" sit to stand Cutoff Scores:13      Single Leg Stance: R 8 seconds, L 6 seconds    Balance:   tandem: 10 sec bilateral mod sway left forward  NBOS EO/EC 30 sec min sway       CMS Impairment/Limitation/Restriction for FOTO hip Survey    Therapist reviewed FOTO scores for Flor Erwin on 2024.   FOTO documents entered into [employer] - see Media section.    function Score: 53%  Category: Mobility         TREATMENT   Treatment Time In: 8:45  Treatment Time Out: 8:55  Total Treatment time separate from Evaluation: 10 minutes    Flor received therapeutic exercises to develop strength, endurance, ROM, flexibility, posture, and core stabilization for   10 minutes including:  Bridge  Clamshell  Straight leg raise   Sit to stand     Home Exercises Provided and Patient Education Provided     Education provided:   Anatomy and Pathology.  Symptom management and plan of care progressions.  Home Exercise Program.    Written Home Exercises Provided: yes.  Exercises were reviewed and Flor was able to demonstrate them prior to the end of the session.  Flor demonstrated good  understanding of the education provided.     See EMR under Patient Instructions for exercises " provided 2/16/2024.      Assessment   Flor is a 73 y.o. female referred to outpatient Physical Therapy with a medical diagnosis of left hip pain. Pt presents with decreased left hip range of motion, decreased lower extremity strength, impaired balance and gait, and decreased functional mobility. Patients signs and symptoms are consistent with Hip osteoarthritis (4/5 CPR=91% likelihood).  These impairments impact pts ability to stand, walk, get out of bed, bend and squat.     Pt prognosis is Good.   Pt will benefit from skilled outpatient Physical Therapy to address the deficits stated above and in the chart below, provide pt/family education, and to maximize pt's level of independence.     Plan of care discussed with patient: Yes  Pt's spiritual, cultural and educational needs considered and patient is agreeable to the plan of care and goals as stated below:     Anticipated Barriers for therapy: co-morbidities     Medical Necessity is demonstrated by the following  History  Co-morbidities and personal factors that may impact the plan of care [x] LOW: no personal factors / co-morbidities  [] MODERATE: 1-2 personal factors / co-morbidities  [] HIGH: 3+ personal factors / co-morbidities    Moderate / High Support Documentation:   Co-morbidities affecting plan of care: dementia, afib, hypertension, hyperthyroidism, rtc tear    Personal Factors:   age     Examination  Body Structures and Functions, activity limitations and participation restrictions that may impact the plan of care [x] LOW: addressing 1-2 elements  [] MODERATE: 3+ elements  [] HIGH: 4+ elements (please support below)    Moderate / High Support Documentation: see above     Clinical Presentation [x] LOW: stable  [] MODERATE: Evolving  [] HIGH: Unstable     Decision Making/ Complexity Score: low         Goals:    Short Term Goals (4 Weeks):  1. Pt will be compliant with HEP to supplement PT in restoring pain free function.  2. Pt will improve impaired LE  MMTs to >/= 4/5 to improve dynamic hip/knee support for functional tasks.  3. Patient will perform timed up and go in 10 seconds to improve functional mobility.    Long Term Goals (8 Weeks):  1. Pt will improve FOTO score to </= 26% limited to decrease perceived limitation with mobility.   2. Pt will improve impaired LE MMTs to >/= 4+/5 to improve dynamic hip/knee support for functional tasks.  3. Pt will improve left hip flexion ROM to 95 deg to improve mobility for functional tasks.  4. Pt will be able to complete 471m on 6 MWT to improve functional mobility.  5. Pt will report morning stiffness 3/10 or less.     Plan   Plan of care Certification: 2/16/2024 to 4/12/24.    Outpatient Physical Therapy 2 times weekly for 8 weeks to include the following interventions: Electrical Stimulation , Gait Training, Manual Therapy, Moist Heat/ Ice, Neuromuscular Re-ed, Patient Education, Therapeutic Activities, and Therapeutic Exercise, ASTYM, Kinesiotaping PRN, Functional Dry Needling    CHARY MURRAY, PT, DPT

## 2024-02-22 ENCOUNTER — CLINICAL SUPPORT (OUTPATIENT)
Dept: REHABILITATION | Facility: HOSPITAL | Age: 74
End: 2024-02-22
Payer: MEDICARE

## 2024-02-22 DIAGNOSIS — R29.898 DECREASED STRENGTH OF LOWER EXTREMITY: ICD-10-CM

## 2024-02-22 DIAGNOSIS — M25.652 DECREASED RANGE OF LEFT HIP MOVEMENT: Primary | ICD-10-CM

## 2024-02-22 PROCEDURE — 97530 THERAPEUTIC ACTIVITIES: CPT | Mod: PN,CQ

## 2024-02-22 PROCEDURE — 97110 THERAPEUTIC EXERCISES: CPT | Mod: PN,CQ

## 2024-02-22 NOTE — PROGRESS NOTES
[facility]   Physical Therapy Treatment Note      Name: Flor Clark [facility] Number: [id number]    Therapy Diagnosis:   Encounter Diagnoses   Name Primary?    Decreased range of left hip movement Yes    Decreased strength of lower extremity      Physician: Manjeet Milian MD    Visit Date: 2/22/2024    Physician Orders: PT Eval and Treat   Medical Diagnosis from Referral: M25.552 (ICD-10-CM) - Left hip pain   Evaluation Date: 2/16/2024  Authorization Period Expiration: 2/4/25  Plan of Care Expiration: 4/12/24  Visit # / Visits authorized: 1/ 20  FOTO: 2/10     Time In: 8:00  Time Out: 8:55  Total Billable Time: 30 1:1 minutes (1TE, 1TA)     Precautions: Standard, hypertension, afib, dementia      Subjective     Patient reports: hip pain has not been as bad lately. Feels like her HEP helps.   She was compliant with home exercise program.  Response to previous treatment: Eval only  Functional change: Ongoing    Pain: 0/10  Location: left hip     Objective      Objective Measures updated at progress report unless specified.     Treatment     Flor received the treatments listed below:      therapeutic exercises to develop strength, endurance, ROM, flexibility, posture, and core stabilization for 15 minutes 1:1 including:  Nu step 6' lvl 1  LTR 2'  Bridge 2 x 10   Clamshell 2 x 10   Straight leg raise 2 x 10   Lateral walking with yellow band 3 laps     manual therapy techniques: Joint mobilizations, Myofacial release, and Soft tissue Mobilization were applied to the: left hip for 00 minutes, including:  Not today    therapeutic activities to improve functional performance for 15 minutes 1:1, including:  Sit to stand 2 x 10   Heel raises 2 x 10       Patient Education and Home Exercises       Education provided:   Anatomy and Pathology.  Symptom management and plan of care progressions.  Home Exercise Program.    Written Home Exercises Provided: Patient instructed to cont prior HEP.  Exercises were reviewed and Flor was able to demonstrate them prior to the end of the session.  Flor demonstrated good  understanding of the education provided. See Electronic Medical Record under Patient Instructions for exercises provided during therapy sessions    Assessment     Flor is a 73 y.o. female referred to outpatient Physical Therapy with a medical diagnosis of left hip pain. Patient presents for first follow up appointment with no current hip pain. Reports overall has been better managed since beginning HEP. Focuses session on addressing hip ROM and strength deficits with good tolerance. Mild fatigue but no exacerbation of pain reported. Will monitor patient response to session and progress as appropriate.     Flor Is progressing well towards her goals.   Patient prognosis is Good.     Patient will continue to benefit from skilled outpatient physical therapy to address the deficits listed in the problem list box on initial evaluation, provide pt/family education and to maximize pt's level of independence in the home and community environment.     Patient's spiritual, cultural and educational needs considered and pt agreeable to plan of care and goals.     Anticipated barriers to physical therapy: co-morbidities     Goals:   Short Term Goals (4 Weeks):  1. Pt will be compliant with HEP to supplement PT in restoring pain free function. (Ongoing, not met)  2. Pt will improve impaired LE MMTs to >/= 4/5 to improve dynamic hip/knee support for functional tasks. (Ongoing, not met)  3. Patient will perform timed up and go in 10 seconds to improve functional mobility. (Ongoing, not met)     Long Term Goals (8 Weeks):  1. Pt will improve FOTO score to </= 26% limited to decrease perceived limitation with mobility. (Ongoing, not met)  2. Pt will improve impaired LE MMTs to >/= 4+/5 to improve dynamic hip/knee support for functional tasks. (Ongoing, not met)  3. Pt will improve left hip flexion ROM to 95 deg to  improve mobility for functional tasks. (Ongoing, not met)  4. Pt will be able to complete 471m on 6 MWT to improve functional mobility. (Ongoing, not met)  5. Pt will report morning stiffness 3/10 or less. (Ongoing, not met)      Plan     Plan of care Certification: 2/16/2024 to 4/12/24.     Rhonda Forrest, PTA

## 2024-02-27 ENCOUNTER — CLINICAL SUPPORT (OUTPATIENT)
Dept: REHABILITATION | Facility: HOSPITAL | Age: 74
End: 2024-02-27
Payer: MEDICARE

## 2024-02-27 DIAGNOSIS — M25.652 DECREASED RANGE OF LEFT HIP MOVEMENT: Primary | ICD-10-CM

## 2024-02-27 DIAGNOSIS — R29.898 DECREASED STRENGTH OF LOWER EXTREMITY: ICD-10-CM

## 2024-02-27 PROCEDURE — 97530 THERAPEUTIC ACTIVITIES: CPT | Mod: PN

## 2024-02-27 PROCEDURE — 97110 THERAPEUTIC EXERCISES: CPT | Mod: PN

## 2024-02-27 NOTE — PROGRESS NOTES
"[facility]   Physical Therapy Treatment Note      Name: Flor Clark [place]  Clinic Number: [id number]    Therapy Diagnosis:   Encounter Diagnoses   Name Primary?    Decreased range of left hip movement Yes    Decreased strength of lower extremity        Physician: Manjeet Milian MD    Visit Date: 2/27/2024    Physician Orders: PT Eval and Treat   Medical Diagnosis from Referral: M25.552 (ICD-10-CM) - Left hip pain   Evaluation Date: 2/16/2024  Authorization Period Expiration: 2/4/25  Plan of Care Expiration: 4/12/24  Visit # / Visits authorized: 2/ 20  FOTO: 3/10     Time In: 9:00  Time Out: 9:55  Total Billable Time: 30 1:1 minutes (1TE, 1TA)     Precautions: Standard, hypertension, afib, dementia      Subjective     Patient reports: mild hip pain today. She has been doing her home exercise program and bike   She was compliant with home exercise program.  Response to previous treatment:no increased pain  Functional change: Ongoing    Pain: 5/10  Location: left hip     Objective      Objective Measures updated at progress report unless specified.     Treatment     Flor received the treatments listed below:      therapeutic exercises to develop strength, endurance, ROM, flexibility, posture, and core stabilization for 15 minutes 1:1 including:  Nu step 6' lvl 1  LTR 2'  Bridge 2 x 10   Clamshell 2 x 10   Straight leg raise 3 x 10   Lateral walking with yellow band 3 laps   Leg press 3 plates 2x10     manual therapy techniques: Joint mobilizations, Myofacial release, and Soft tissue Mobilization were applied to the: left hip for 00 minutes, including:  Not today    therapeutic activities to improve functional performance for 15 minutes 1:1, including:  Sit to stand 2 x 12   Heel raises 3 x 10   Step ups L1 2x10 bilateral   SLS 3x30"  Hurdles fwd/;at 3x each       Patient Education and Home Exercises       Education provided:   Anatomy and Pathology.  Symptom management and plan of care " progressions.  Home Exercise Program.    Written Home Exercises Provided: Patient instructed to cont prior HEP. Exercises were reviewed and Flor was able to demonstrate them prior to the end of the session.  Flor demonstrated good  understanding of the education provided. See Electronic Medical Record under Patient Instructions for exercises provided during therapy sessions    Assessment     Flor is a 73 y.o. female referred to outpatient Physical Therapy with a medical diagnosis of left hip pain. Patient presents  with mild- mod hip pain today. Good tolerance for all exercises focused on quad and hip strengthening in open and closed chain positions. Incorporated proprioception tasks into treatment to decrease fall risk. No increased pain post treatment. Continue to progress as tolerated.      Flor Is progressing well towards her goals.   Patient prognosis is Good.     Patient will continue to benefit from skilled outpatient physical therapy to address the deficits listed in the problem list box on initial evaluation, provide pt/family education and to maximize pt's level of independence in the home and community environment.     Patient's spiritual, cultural and educational needs considered and pt agreeable to plan of care and goals.     Anticipated barriers to physical therapy: co-morbidities     Goals:   Short Term Goals (4 Weeks):  1. Pt will be compliant with HEP to supplement PT in restoring pain free function. (Ongoing, not met)  2. Pt will improve impaired LE MMTs to >/= 4/5 to improve dynamic hip/knee support for functional tasks. (Ongoing, not met)  3. Patient will perform timed up and go in 10 seconds to improve functional mobility. (Ongoing, not met)     Long Term Goals (8 Weeks):  1. Pt will improve FOTO score to </= 26% limited to decrease perceived limitation with mobility. (Ongoing, not met)  2. Pt will improve impaired LE MMTs to >/= 4+/5 to improve dynamic hip/knee support for functional tasks.  (Ongoing, not met)  3. Pt will improve left hip flexion ROM to 95 deg to improve mobility for functional tasks. (Ongoing, not met)  4. Pt will be able to complete 471m on 6 MWT to improve functional mobility. (Ongoing, not met)  5. Pt will report morning stiffness 3/10 or less. (Ongoing, not met)      Plan     Plan of care Certification: 2/16/2024 to 4/12/24.     CHARY MURRAY, PT

## 2024-02-29 ENCOUNTER — CLINICAL SUPPORT (OUTPATIENT)
Dept: REHABILITATION | Facility: HOSPITAL | Age: 74
End: 2024-02-29
Payer: MEDICARE

## 2024-02-29 DIAGNOSIS — M25.652 DECREASED RANGE OF LEFT HIP MOVEMENT: Primary | ICD-10-CM

## 2024-02-29 DIAGNOSIS — R29.898 DECREASED STRENGTH OF LOWER EXTREMITY: ICD-10-CM

## 2024-02-29 PROCEDURE — 97140 MANUAL THERAPY 1/> REGIONS: CPT | Mod: PN

## 2024-02-29 PROCEDURE — 97530 THERAPEUTIC ACTIVITIES: CPT | Mod: PN

## 2024-02-29 NOTE — PROGRESS NOTES
"[facility]   Physical Therapy Treatment Note      Name: Flor Clark [place]  Clinic Number: [id number]    Therapy Diagnosis:   Encounter Diagnoses   Name Primary?    Decreased range of left hip movement Yes    Decreased strength of lower extremity        Physician: Manjeet Milian MD    Visit Date: 2/29/2024    Physician Orders: PT Eval and Treat   Medical Diagnosis from Referral: M25.552 (ICD-10-CM) - Left hip pain   Evaluation Date: 2/16/2024  Authorization Period Expiration: 2/4/25  Plan of Care Expiration: 4/12/24  Visit # / Visits authorized: 3/ 20  FOTO: 4/10     Time In: 9:00  Time Out: 9:55  Total Billable Time: 30 1:1 minutes (1MT, 1TA)     Precautions: Standard, hypertension, afib, dementia      Subjective     Patient reports: mild hip pain today. She has been doing her home exercise program and bike   She was compliant with home exercise program.  Response to previous treatment:no increased pain  Functional change: Ongoing    Pain: 6/10  Location: left hip     Objective      Objective Measures updated at progress report unless specified.     Treatment     Flor received the treatments listed below:      therapeutic exercises to develop strength, endurance, ROM, flexibility, posture, and core stabilization for 0minutes 1:1 including:  Nu step 6' lvl 1  LTR 2'  Bridge 2 x 10   Clamshell 2 x 10   Straight leg raise 3 x 10   Lateral walking with yellow band 3 laps   Leg press 3 plates 2x10     manual therapy techniques: Joint mobilizations, Myofacial release, and Soft tissue Mobilization were applied to the: left hip for 10 minutes, including:  LAD   Lateral glide grade 3   Inferior glide grade 3    therapeutic activities to improve functional performance for 15 minutes 1:1, including:  Sit to stand 2 x 12   Heel raises 3 x 10   Step ups L1 2x10 bilateral   SLS 3x30"  Hurdles fwd/;at 3x each       Patient Education and Home Exercises       Education provided:   Anatomy and " Pathology.  Symptom management and plan of care progressions.  Home Exercise Program.    Written Home Exercises Provided: Patient instructed to cont prior HEP. Exercises were reviewed and Flor was able to demonstrate them prior to the end of the session.  Flor demonstrated good  understanding of the education provided. See Electronic Medical Record under Patient Instructions for exercises provided during therapy sessions    Assessment     Flor is a 73 y.o. female referred to outpatient Physical Therapy with a medical diagnosis of left hip pain. Patient presents with mod hip pain today. Added joint mobilizations to the left hip.  Good tolerance for all exercises focused on quad and hip strengthening in open and closed chain positions. No increased pain post treatment. Continue to progress as tolerated.      Flor Is progressing well towards her goals.   Patient prognosis is Good.     Patient will continue to benefit from skilled outpatient physical therapy to address the deficits listed in the problem list box on initial evaluation, provide pt/family education and to maximize pt's level of independence in the home and community environment.     Patient's spiritual, cultural and educational needs considered and pt agreeable to plan of care and goals.     Anticipated barriers to physical therapy: co-morbidities     Goals:   Short Term Goals (4 Weeks):  1. Pt will be compliant with HEP to supplement PT in restoring pain free function. (Ongoing, not met)  2. Pt will improve impaired LE MMTs to >/= 4/5 to improve dynamic hip/knee support for functional tasks. (Ongoing, not met)  3. Patient will perform timed up and go in 10 seconds to improve functional mobility. (Ongoing, not met)     Long Term Goals (8 Weeks):  1. Pt will improve FOTO score to </= 26% limited to decrease perceived limitation with mobility. (Ongoing, not met)  2. Pt will improve impaired LE MMTs to >/= 4+/5 to improve dynamic hip/knee support for  functional tasks. (Ongoing, not met)  3. Pt will improve left hip flexion ROM to 95 deg to improve mobility for functional tasks. (Ongoing, not met)  4. Pt will be able to complete 471m on 6 MWT to improve functional mobility. (Ongoing, not met)  5. Pt will report morning stiffness 3/10 or less. (Ongoing, not met)      Plan     Plan of care Certification: 2/16/2024 to 4/12/24.     CHARY MURRAY, PT

## 2024-03-04 ENCOUNTER — LAB VISIT (OUTPATIENT)
Dept: LAB | Facility: HOSPITAL | Age: 74
End: 2024-03-04
Attending: INTERNAL MEDICINE
Payer: MEDICARE

## 2024-03-04 DIAGNOSIS — R73.01 IMPAIRED FASTING GLUCOSE: ICD-10-CM

## 2024-03-04 DIAGNOSIS — E03.9 MYXEDEMA HEART DISEASE: Primary | ICD-10-CM

## 2024-03-04 DIAGNOSIS — I51.9 MYXEDEMA HEART DISEASE: Primary | ICD-10-CM

## 2024-03-04 DIAGNOSIS — I10 ESSENTIAL HYPERTENSION, MALIGNANT: ICD-10-CM

## 2024-03-04 LAB
ALBUMIN SERPL BCP-MCNC: 3.8 G/DL (ref 3.5–5.2)
ALP SERPL-CCNC: 78 U/L (ref 55–135)
ALT SERPL W/O P-5'-P-CCNC: 14 U/L (ref 10–44)
ANION GAP SERPL CALC-SCNC: 9 MMOL/L (ref 8–16)
AST SERPL-CCNC: 16 U/L (ref 10–40)
BILIRUB SERPL-MCNC: 0.5 MG/DL (ref 0.1–1)
BUN SERPL-MCNC: 13 MG/DL (ref 8–23)
CALCIUM SERPL-MCNC: 9.4 MG/DL (ref 8.7–10.5)
CHLORIDE SERPL-SCNC: 108 MMOL/L (ref 95–110)
CO2 SERPL-SCNC: 25 MMOL/L (ref 23–29)
CREAT SERPL-MCNC: 0.8 MG/DL (ref 0.5–1.4)
EST. GFR  (NO RACE VARIABLE): >60 ML/MIN/1.73 M^2
GLUCOSE SERPL-MCNC: 83 MG/DL (ref 70–110)
POTASSIUM SERPL-SCNC: 3.9 MMOL/L (ref 3.5–5.1)
PROT SERPL-MCNC: 7.1 G/DL (ref 6–8.4)
SODIUM SERPL-SCNC: 142 MMOL/L (ref 136–145)
T4 FREE SERPL-MCNC: 1.12 NG/DL (ref 0.71–1.51)
TSH SERPL DL<=0.005 MIU/L-ACNC: 6.82 UIU/ML (ref 0.4–4)

## 2024-03-04 PROCEDURE — 84445 ASSAY OF TSI GLOBULIN: CPT | Performed by: INTERNAL MEDICINE

## 2024-03-04 PROCEDURE — 84439 ASSAY OF FREE THYROXINE: CPT | Performed by: INTERNAL MEDICINE

## 2024-03-04 PROCEDURE — 80053 COMPREHEN METABOLIC PANEL: CPT | Performed by: INTERNAL MEDICINE

## 2024-03-04 PROCEDURE — 84443 ASSAY THYROID STIM HORMONE: CPT | Performed by: INTERNAL MEDICINE

## 2024-03-05 ENCOUNTER — CLINICAL SUPPORT (OUTPATIENT)
Dept: REHABILITATION | Facility: HOSPITAL | Age: 74
End: 2024-03-05
Payer: MEDICARE

## 2024-03-05 ENCOUNTER — PATIENT MESSAGE (OUTPATIENT)
Dept: ADMINISTRATIVE | Facility: OTHER | Age: 74
End: 2024-03-05
Payer: MEDICARE

## 2024-03-05 DIAGNOSIS — R29.898 DECREASED STRENGTH OF LOWER EXTREMITY: ICD-10-CM

## 2024-03-05 DIAGNOSIS — M25.652 DECREASED RANGE OF LEFT HIP MOVEMENT: Primary | ICD-10-CM

## 2024-03-05 PROCEDURE — 97140 MANUAL THERAPY 1/> REGIONS: CPT | Mod: PN

## 2024-03-05 PROCEDURE — 97530 THERAPEUTIC ACTIVITIES: CPT | Mod: PN

## 2024-03-05 NOTE — PROGRESS NOTES
"[facility]   Physical Therapy Treatment Note      Name: Flor Clark [place]  Clinic Number: [id number]    Therapy Diagnosis:   Encounter Diagnoses   Name Primary?    Decreased range of left hip movement Yes    Decreased strength of lower extremity        Physician: Manjeet Milian MD    Visit Date: 3/5/2024    Physician Orders: PT Eval and Treat   Medical Diagnosis from Referral: M25.552 (ICD-10-CM) - Left hip pain   Evaluation Date: 2/16/2024  Authorization Period Expiration: 2/4/25  Plan of Care Expiration: 4/12/24  Visit # / Visits authorized: 4/ 20  FOTO: 4/10     Time In: 9:00  Time Out: 9:55  Total Billable Time: 30 1:1 minutes (1MT, 1TA)     Precautions: Standard, hypertension, afib, dementia    Subjective     Patient reports: little soreness, but manageable  She was compliant with home exercise program.  Response to previous treatment:no increased pain  Functional change: Ongoing    Pain: 4/10  Location: left hip     Objective      Objective Measures updated at progress report unless specified.     Treatment     Flor received the treatments listed below:      therapeutic exercises to develop strength, endurance, ROM, flexibility, posture, and core stabilization for 0minutes 1:1 including:  Nu step 6' lvl 1  LTR 2'  Bridge 2 x 10   Clamshell x15 RTB 5" holds  Straight leg raise 2 x 10 2#  Lateral walking with yellow band 3 laps   Leg press 3 plates 2x10 -NT  Standing marches with BUE 1' x2 rounds    manual therapy techniques: Joint mobilizations, Myofacial release, and Soft tissue Mobilization were applied to the: left hip for 10 minutes, including:  LAD   Lateral glide grade 3   Inferior glide grade 3    therapeutic activities to improve functional performance for 20 minutes 1:1, including:  Sit to stand 2 x 12   Heel raises 3 x 10   Step ups 2x10 bilateral 6 inch  SLS 3x30"  Hurdles fwd/;at 3x each     Patient Education and Home Exercises       Education provided:   Anatomy and " Pathology.  Symptom management and plan of care progressions.  Home Exercise Program.    Written Home Exercises Provided: Patient instructed to cont prior HEP. Exercises were reviewed and Flor was able to demonstrate them prior to the end of the session.  Flor demonstrated good  understanding of the education provided. See Electronic Medical Record under Patient Instructions for exercises provided during therapy sessions    Assessment     Flor is a 73 y.o. female referred to outpatient Physical Therapy with a medical diagnosis of left hip pain. Patient presents with mod hip pain today. Performed all there-ex with no adverse effects noted. Some difficulty with added weight with SLR, but able to maintain good form. Continue to progress to [facility] strengthening as tolerated.    Flor Is progressing well towards her goals.   Patient prognosis is Good.     Patient will continue to benefit from skilled outpatient physical therapy to address the deficits listed in the problem list box on initial evaluation, provide pt/family education and to maximize pt's level of independence in the home and community environment.     Patient's spiritual, cultural and educational needs considered and pt agreeable to plan of care and goals.     Anticipated barriers to physical therapy: co-morbidities     Goals:   Short Term Goals (4 Weeks):  1. Pt will be compliant with HEP to supplement PT in restoring pain free function. (Ongoing, not met)  2. Pt will improve impaired LE MMTs to >/= 4/5 to improve dynamic hip/knee support for functional tasks. (Ongoing, not met)  3. Patient will perform timed up and go in 10 seconds to improve functional mobility. (Ongoing, not met)     Long Term Goals (8 Weeks):  1. Pt will improve FOTO score to </= 26% limited to decrease perceived limitation with mobility. (Ongoing, not met)  2. Pt will improve impaired LE MMTs to >/= 4+/5 to improve dynamic hip/knee support for functional tasks. (Ongoing, not met)  3. Pt  will improve left hip flexion ROM to 95 deg to improve mobility for functional tasks. (Ongoing, not met)  4. Pt will be able to complete 471m on 6 MWT to improve functional mobility. (Ongoing, not met)  5. Pt will report morning stiffness 3/10 or less. (Ongoing, not met)      Plan     Plan of care Certification: 2/16/2024 to 4/12/24.     Kurt Tran, PT

## 2024-03-07 ENCOUNTER — CLINICAL SUPPORT (OUTPATIENT)
Dept: REHABILITATION | Facility: HOSPITAL | Age: 74
End: 2024-03-07
Payer: MEDICARE

## 2024-03-07 DIAGNOSIS — R29.898 DECREASED STRENGTH OF LOWER EXTREMITY: ICD-10-CM

## 2024-03-07 DIAGNOSIS — M25.652 DECREASED RANGE OF LEFT HIP MOVEMENT: Primary | ICD-10-CM

## 2024-03-07 LAB — TSI SER-ACNC: 0.34 IU/L

## 2024-03-07 PROCEDURE — 97110 THERAPEUTIC EXERCISES: CPT | Mod: PN

## 2024-03-07 PROCEDURE — 97530 THERAPEUTIC ACTIVITIES: CPT | Mod: PN

## 2024-03-07 NOTE — PROGRESS NOTES
"[facility]   Physical Therapy Treatment Note      Name: Flor Clark [place]  Clinic Number: [id number]    Therapy Diagnosis:   Encounter Diagnoses   Name Primary?    Decreased range of left hip movement Yes    Decreased strength of lower extremity        Physician: Manjeet Milian MD    Visit Date: 3/7/2024    Physician Orders: PT Eval and Treat   Medical Diagnosis from Referral: M25.552 (ICD-10-CM) - Left hip pain   Evaluation Date: 2/16/2024  Authorization Period Expiration: 2/4/25  Plan of Care Expiration: 4/12/24  Visit # / Visits authorized: 5/ 20 FOTO: 5/10     Time In: 9:00  Time Out: 9:55  Total Billable Time: 30 1:1 minutes (1 TE1TA)  Precautions: Standard, hypertension, afib, dementia    Subjective     Patient reports: denies hip pain.  States 'I am doing much better'.   She was compliant with home exercise program.  Response to previous treatment:no increased pain  Functional change: Ongoing    Pain: 0/10  Location: left hip     Objective      Objective Measures updated at progress report unless specified.     Treatment     Flor received the treatments listed below:      Therapeutic exercises to develop strength, endurance, ROM, flexibility, posture, and core stabilization for 15 minutes 1:1 including:  Nu step 6' [place] level 4  LTR 2'  Bridge 3 x 10   Clamshell 2x15 RTB 5" holds  Straight leg raise 2 x 10 2#  Lateral walking with yellow band 3 laps   Leg press 3 plates 2x10 -NT  Standing marches with BUE 1' x2 rounds    manual therapy techniques: Joint mobilizations, Myofacial release, and Soft tissue Mobilization were applied to the: left hip for 0 minutes, including:  LAD   Lateral glide grade 3   Inferior glide grade 3    therapeutic activities to improve functional performance for 15 minutes 1:1, including:  Sit to stand 3 x 12   Heel raises 3 x 15  Step ups 3x10 bilateral 9" inch  SLS 3x30"  Hurdles fwd/;at 3x each     Patient Education and Home Exercises       Education " provided:   Anatomy and Pathology.  Symptom management and plan of care progressions.  Home Exercise Program.    Written Home Exercises Provided: Patient instructed to cont prior HEP. Exercises were reviewed and Flor was able to demonstrate them prior to the end of the session.  Flor demonstrated good  understanding of the education provided. See Electronic Medical Record under Patient Instructions for exercises provided during therapy sessions    Assessment     Flor is a 73 y.o. female referred to outpatient Physical Therapy with a medical diagnosis of left hip pain. Denies hip pain today upon arrival or during session. Able to tolerate progression without issue.     Flor Is progressing well towards her goals.   Patient prognosis is Good.     Patient will continue to benefit from skilled outpatient physical therapy to address the deficits listed in the problem list box on initial evaluation, provide pt/family education and to maximize pt's level of independence in the home and community environment.   Patient's spiritual, cultural and educational needs considered and pt agreeable to plan of care and goals.     Anticipated barriers to physical therapy: co-morbidities     Goals:   Short Term Goals (4 Weeks):  1. Pt will be compliant with HEP to supplement PT in restoring pain free function. (Ongoing, not met)  2. Pt will improve impaired LE MMTs to >/= 4/5 to improve dynamic hip/knee support for functional tasks. (Ongoing, not met)  3. Patient will perform timed up and go in 10 seconds to improve functional mobility. (Ongoing, not met)     Long Term Goals (8 Weeks):  1. Pt will improve FOTO score to </= 26% limited to decrease perceived limitation with mobility. (Ongoing, not met)  2. Pt will improve impaired LE MMTs to >/= 4+/5 to improve dynamic hip/knee support for functional tasks. (Ongoing, not met)  3. Pt will improve left hip flexion ROM to 95 deg to improve mobility for functional tasks. (Ongoing, not  met)  4. Pt will be able to complete 471m on 6 MWT to improve functional mobility. (Ongoing, not met)  5. Pt will report morning stiffness 3/10 or less. (Ongoing, not met)      Plan   Continue plan of care.  Monitor response to interventions.  Adjust intensity accordingly.     Sameer Cano, PT, DPT, OCS

## 2024-03-25 ENCOUNTER — DOCUMENTATION ONLY (OUTPATIENT)
Dept: REHABILITATION | Facility: HOSPITAL | Age: 74
End: 2024-03-25
Payer: MEDICARE

## 2024-03-25 NOTE — PROGRESS NOTES
Physical Therapy Discharge summary    Pt was evaluated on 24 and was seen 5 times for PT. Pt has not attended PT since 3/7/24. PT cancelled remaining visits due to improvement in symptoms and feels she no longer needs to be seen. Patient given HEP. Plan of care and/or authorization . Pt to be discharged at this time.    Tricia Henry, PT, DPT

## 2024-04-10 DIAGNOSIS — I70.0 AORTIC ATHEROSCLEROSIS: ICD-10-CM

## 2024-04-10 RX ORDER — ROSUVASTATIN CALCIUM 5 MG/1
5 TABLET, COATED ORAL
Qty: 90 TABLET | Refills: 3 | Status: SHIPPED | OUTPATIENT
Start: 2024-04-10

## 2024-05-12 ENCOUNTER — PATIENT MESSAGE (OUTPATIENT)
Dept: FAMILY MEDICINE | Facility: CLINIC | Age: 74
End: 2024-05-12
Payer: MEDICARE

## 2024-05-13 ENCOUNTER — TELEPHONE (OUTPATIENT)
Dept: FAMILY MEDICINE | Facility: CLINIC | Age: 74
End: 2024-05-13
Payer: MEDICARE

## 2024-06-12 ENCOUNTER — TELEPHONE (OUTPATIENT)
Dept: FAMILY MEDICINE | Facility: CLINIC | Age: 74
End: 2024-06-12
Payer: MEDICARE

## 2024-06-12 ENCOUNTER — OFFICE VISIT (OUTPATIENT)
Dept: FAMILY MEDICINE | Facility: CLINIC | Age: 74
End: 2024-06-12
Payer: MEDICARE

## 2024-06-12 VITALS
HEART RATE: 74 BPM | WEIGHT: 159.19 LBS | BODY MASS INDEX: 29.3 KG/M2 | HEIGHT: 62 IN | OXYGEN SATURATION: 99 % | SYSTOLIC BLOOD PRESSURE: 116 MMHG | DIASTOLIC BLOOD PRESSURE: 80 MMHG

## 2024-06-12 DIAGNOSIS — K52.9 GASTROENTERITIS: ICD-10-CM

## 2024-06-12 DIAGNOSIS — R10.13 EPIGASTRIC PAIN: Primary | ICD-10-CM

## 2024-06-12 DIAGNOSIS — R19.7 DIARRHEA, UNSPECIFIED TYPE: ICD-10-CM

## 2024-06-12 DIAGNOSIS — K21.9 GASTROESOPHAGEAL REFLUX DISEASE, UNSPECIFIED WHETHER ESOPHAGITIS PRESENT: ICD-10-CM

## 2024-06-12 PROCEDURE — 99214 OFFICE O/P EST MOD 30 MIN: CPT | Mod: S$GLB,,, | Performed by: FAMILY MEDICINE

## 2024-06-12 PROCEDURE — 1101F PT FALLS ASSESS-DOCD LE1/YR: CPT | Mod: CPTII,S$GLB,, | Performed by: FAMILY MEDICINE

## 2024-06-12 PROCEDURE — 1125F AMNT PAIN NOTED PAIN PRSNT: CPT | Mod: CPTII,S$GLB,, | Performed by: FAMILY MEDICINE

## 2024-06-12 PROCEDURE — 3288F FALL RISK ASSESSMENT DOCD: CPT | Mod: CPTII,S$GLB,, | Performed by: FAMILY MEDICINE

## 2024-06-12 PROCEDURE — 3074F SYST BP LT 130 MM HG: CPT | Mod: CPTII,S$GLB,, | Performed by: FAMILY MEDICINE

## 2024-06-12 PROCEDURE — 3079F DIAST BP 80-89 MM HG: CPT | Mod: CPTII,S$GLB,, | Performed by: FAMILY MEDICINE

## 2024-06-12 PROCEDURE — 1159F MED LIST DOCD IN RCRD: CPT | Mod: CPTII,S$GLB,, | Performed by: FAMILY MEDICINE

## 2024-06-12 PROCEDURE — 99999 PR PBB SHADOW E&M-EST. PATIENT-LVL IV: CPT | Mod: PBBFAC,,, | Performed by: FAMILY MEDICINE

## 2024-06-12 RX ORDER — OMEPRAZOLE 40 MG/1
40 CAPSULE, DELAYED RELEASE ORAL DAILY
Qty: 30 CAPSULE | Refills: 1 | Status: SHIPPED | OUTPATIENT
Start: 2024-06-12 | End: 2025-06-12

## 2024-06-12 NOTE — PROGRESS NOTES
(Portions of this note were dictated using voice recognition software and may contain dictation related errors in spelling/grammar/syntax not found on text review)    CC:   Chief Complaint   Patient presents with    Abdominal Pain     Lower bilateral     Diarrhea     Started Monday        HPI: 74 y.o. female presented for evaluation of abdominal pain and diarrhea.  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      Patient reports symptoms started on Sunday, she ate crawfish which was spicy and salty from outside and after that started having upset stomach, reports having pain in the stomach area in the center, describes as burning pain which is constant, symptoms aggravates with eating food, however, she experiences even without eating anything, has been using heating pad and Pepto-Bismol with some relief.  She also reports having few episodes of diarrhea, states that whenever she uses the bathroom having loose watery diarrhea, denies having any blood in the stools, denies having any nausea/vomiting.    She has history of cholecystectomy.      She denies having any other symptoms or concerns.    Past Medical History:   Diagnosis Date    Hypertension        Past Surgical History:   Procedure Laterality Date    ARTHROSCOPIC REPAIR OF ROTATOR CUFF OF SHOULDER Left 5/28/2021    Procedure: REPAIR, ROTATOR CUFF, ARTHROSCOPIC, AC resection;  Surgeon: Michael Short Jr., MD;  Location: [facility] OR;  Service: Orthopedics;  Laterality: Left;  need opus system  [employer] notified 5/11/21 CC    ARTHROSCOPY OF SHOULDER WITH DECOMPRESSION OF SUBACROMIAL SPACE  5/28/2021    Procedure: ARTHROSCOPY, SHOULDER, WITH SUBACROMIAL SPACE DECOMPRESSION;  Surgeon: Michael Short Jr., MD;  Location: [facility] OR;  Service: Orthopedics;;    CHOLECYSTECTOMY      COLONOSCOPY N/A 8/29/2022    Procedure: COLONOSCOPY Suprep;   Surgeon: Prince Cummings MD;  Location: [facility];  Service: Endoscopy;  Laterality: N/A;    HYSTERECTOMY      partial in her 40's    OOPHORECTOMY         Family History   Problem Relation Name Age of Onset    Hyperlipidemia Mother      Heart attack Mother      Coronary artery disease Mother      Diabetes Father      Diabetes Mellitus Father      Peripheral vascular disease Father      No Known Problems Sister x1 -drowned     Heart disease Sister x1     Hypertension Sister x1     Hypertension Brother x2     Peripheral vascular disease Brother x2     No Known Problems Daughter x2     No Known Problems Son x1        Social History     Tobacco Use    Smoking status: Never    Smokeless tobacco: Never   Substance Use Topics    Alcohol use: No    Drug use: No       Lab Results   Component Value Date    WBC 5.36 08/07/2023    HGB 12.9 08/07/2023    HCT 40.2 08/07/2023    MCV 92 08/07/2023     08/07/2023    CHOL 168 08/07/2023    TRIG 63 08/07/2023    HDL 79 (H) 08/07/2023    ALT 14 03/04/2024    AST 16 03/04/2024    BILITOT 0.5 03/04/2024    ALKPHOS 78 03/04/2024     03/04/2024    K 3.9 03/04/2024     03/04/2024    CREATININE 0.8 03/04/2024    ESTGFRAFRICA >60 10/15/2021    EGFRNONAA >60 10/15/2021    CALCIUM 9.4 03/04/2024    ALBUMIN 3.8 03/04/2024    BUN 13 03/04/2024    CO2 25 03/04/2024    TSH 6.818 (H) 03/04/2024    INR 1.0 12/25/2019    HGBA1C 5.1 06/09/2021    LDLCALC 76.4 08/07/2023    GLU 83 03/04/2024             Vital signs reviewed  PE:   APPEARANCE: Well nourished, well developed, in no acute distress.    HEAD: Normocephalic, atraumatic.  EYES: EOMI.  Conjunctivae noninjected.  NOSE: Mucosa pink. Airway clear.  MOUTH & THROAT: No tonsillar enlargement. No pharyngeal erythema or exudate.   NECK: Supple with no cervical lymphadenopathy.    CHEST: Good inspiratory effort. Lungs clear to auscultation with no wheezes or crackles.  CARDIOVASCULAR: Normal S1, S2. No rubs, murmurs, or  gallops.  ABDOMEN: Bowel sounds normal. Not distended. Soft. TTP in epigastrium, No organomegaly.  EXTREMITIES: No edema, cyanosis, or clubbing.    Review of Systems   Constitutional:  Negative for chills, fatigue and fever.   HENT: Negative.     Respiratory:  Negative for cough, shortness of breath and wheezing.    Cardiovascular:  Negative for chest pain, palpitations and leg swelling.   Gastrointestinal:  Positive for abdominal pain, change in bowel habit, diarrhea and reflux. Negative for nausea and vomiting.   Genitourinary: Negative.    Musculoskeletal: Negative.    Neurological: Negative.    Psychiatric/Behavioral: Negative.     All other systems reviewed and are negative.      IMPRESSION  1. Gastroesophageal reflux disease, unspecified whether esophagitis present    2. Epigastric pain    3. Diarrhea, unspecified type    4. Gastroenteritis            PLAN      1. Gastroesophageal reflux disease, unspecified whether esophagitis present    Advised to avoid spicy and acidic food    To add high-fiber diet on daily basis      - omeprazole (PRILOSEC) 40 MG capsule; Take 1 capsule (40 mg total) by mouth once daily.  Dispense: 30 capsule; Refill: 1      2. Epigastric pain    - omeprazole (PRILOSEC) 40 MG capsule; Take 1 capsule (40 mg total) by mouth once daily.  Dispense: 30 capsule; Refill: 1        3. Diarrhea, unspecified type    Encouraged to eat easily digestible and bland food  like chicken noodle soup    Encouraged to maintain enough hydration, can take Pedialyte    If symptoms do not improve by next week, would consider imaging      4. Gastroenteritis       Symptomatic treatment recommended   Prilosec given for acid reflux symptoms     Encouraged to eat easily digestible and bland food  like chicken noodle soup    Encouraged to maintain enough hydration, can take Pedialyte    If symptoms do not improve by next week, would consider imaging          SCREENINGS          Age/demographic appropriate health  maintenance:    Health Maintenance Due   Topic Date Due    RSV Vaccine (Age 60+ and Pregnant patients) (1 - 1-dose 60+ series) Never done    Hemoglobin A1c (Prediabetes)  06/09/2022    COVID-19 Vaccine (6 - 2023-24 season) 09/01/2023         Return ED/urgent care precautions given        Spent adequate time in obtaining history and explaining differentials     35 minutes spent during this visit of which greater than 50% devoted to face-face counseling and coordination of care regarding diagnosis and management plan       Manjeet Milina   6/12/2024

## 2024-06-24 ENCOUNTER — TELEPHONE (OUTPATIENT)
Dept: FAMILY MEDICINE | Facility: CLINIC | Age: 74
End: 2024-06-24
Payer: MEDICARE

## 2024-06-24 ENCOUNTER — HOSPITAL ENCOUNTER (OUTPATIENT)
Facility: HOSPITAL | Age: 74
Discharge: HOME | End: 2024-06-25
Attending: EMERGENCY MEDICINE | Admitting: STUDENT IN AN ORGANIZED HEALTH CARE EDUCATION/TRAINING PROGRAM
Payer: MEDICARE

## 2024-06-24 ENCOUNTER — HOSPITAL ENCOUNTER (OUTPATIENT)
Dept: RADIOLOGY | Facility: HOSPITAL | Age: 74
Discharge: HOME | End: 2024-06-24
Attending: FAMILY MEDICINE
Payer: MEDICARE

## 2024-06-24 DIAGNOSIS — R10.13 EPIGASTRIC PAIN: Primary | ICD-10-CM

## 2024-06-24 DIAGNOSIS — K43.6 VENTRAL HERNIA WITH OBSTRUCTION AND WITHOUT GANGRENE: ICD-10-CM

## 2024-06-24 DIAGNOSIS — R10.13 EPIGASTRIC PAIN: ICD-10-CM

## 2024-06-24 DIAGNOSIS — K56.609 SBO (SMALL BOWEL OBSTRUCTION): Primary | ICD-10-CM

## 2024-06-24 DIAGNOSIS — Z46.59 ENCOUNTER FOR NASOGASTRIC (NG) TUBE PLACEMENT: ICD-10-CM

## 2024-06-24 LAB
ALBUMIN SERPL BCP-MCNC: 4.1 G/DL (ref 3.5–5.2)
ALP SERPL-CCNC: 96 U/L (ref 55–135)
ALT SERPL W/O P-5'-P-CCNC: 20 U/L (ref 10–44)
ANION GAP SERPL CALC-SCNC: 8 MMOL/L (ref 8–16)
AST SERPL-CCNC: 22 U/L (ref 10–40)
BASOPHILS # BLD AUTO: 0.03 K/UL (ref 0–0.2)
BASOPHILS NFR BLD: 0.5 % (ref 0–1.9)
BILIRUB SERPL-MCNC: 0.4 MG/DL (ref 0.1–1)
BILIRUB UR QL STRIP: NEGATIVE
BUN SERPL-MCNC: 12 MG/DL (ref 8–23)
CALCIUM SERPL-MCNC: 10 MG/DL (ref 8.7–10.5)
CHLORIDE SERPL-SCNC: 107 MMOL/L (ref 95–110)
CLARITY UR: ABNORMAL
CO2 SERPL-SCNC: 25 MMOL/L (ref 23–29)
COLOR UR: YELLOW
CREAT SERPL-MCNC: 1 MG/DL (ref 0.5–1.4)
DIFFERENTIAL METHOD BLD: ABNORMAL
EOSINOPHIL # BLD AUTO: 0.1 K/UL (ref 0–0.5)
EOSINOPHIL NFR BLD: 1.4 % (ref 0–8)
ERYTHROCYTE [DISTWIDTH] IN BLOOD BY AUTOMATED COUNT: 14.9 % (ref 11.5–14.5)
EST. GFR  (NO RACE VARIABLE): 58 ML/MIN/1.73 M^2
GLUCOSE SERPL-MCNC: 99 MG/DL (ref 70–110)
GLUCOSE UR QL STRIP: NEGATIVE
HCT VFR BLD AUTO: 41.4 % (ref 37–48.5)
HGB BLD-MCNC: 13.7 G/DL (ref 12–16)
HGB UR QL STRIP: ABNORMAL
IMM GRANULOCYTES # BLD AUTO: 0.01 K/UL (ref 0–0.04)
IMM GRANULOCYTES NFR BLD AUTO: 0.2 % (ref 0–0.5)
KETONES UR QL STRIP: ABNORMAL
LACTATE SERPL-SCNC: 0.9 MMOL/L (ref 0.5–2.2)
LEUKOCYTE ESTERASE UR QL STRIP: ABNORMAL
LIPASE SERPL-CCNC: 11 U/L (ref 4–60)
LYMPHOCYTES # BLD AUTO: 2.3 K/UL (ref 1–4.8)
LYMPHOCYTES NFR BLD: 36.1 % (ref 18–48)
MCH RBC QN AUTO: 29.8 PG (ref 27–31)
MCHC RBC AUTO-ENTMCNC: 33.1 G/DL (ref 32–36)
MCV RBC AUTO: 90 FL (ref 82–98)
MICROSCOPIC COMMENT: ABNORMAL
MONOCYTES # BLD AUTO: 0.7 K/UL (ref 0.3–1)
MONOCYTES NFR BLD: 10.3 % (ref 4–15)
NEUTROPHILS # BLD AUTO: 3.3 K/UL (ref 1.8–7.7)
NEUTROPHILS NFR BLD: 51.5 % (ref 38–73)
NITRITE UR QL STRIP: NEGATIVE
NRBC BLD-RTO: 0 /100 WBC
PH UR STRIP: 6 [PH] (ref 5–8)
PLATELET # BLD AUTO: 294 K/UL (ref 150–450)
PMV BLD AUTO: 10.5 FL (ref 9.2–12.9)
POTASSIUM SERPL-SCNC: 3.9 MMOL/L (ref 3.5–5.1)
PROT SERPL-MCNC: 7.6 G/DL (ref 6–8.4)
PROT UR QL STRIP: NEGATIVE
RBC # BLD AUTO: 4.59 M/UL (ref 4–5.4)
RBC #/AREA URNS HPF: 1 /HPF (ref 0–4)
SODIUM SERPL-SCNC: 140 MMOL/L (ref 136–145)
SP GR UR STRIP: 1.02 (ref 1–1.03)
SQUAMOUS #/AREA URNS HPF: 8 /HPF
URN SPEC COLLECT METH UR: ABNORMAL
UROBILINOGEN UR STRIP-ACNC: NEGATIVE EU/DL
WBC # BLD AUTO: 6.38 K/UL (ref 3.9–12.7)
WBC #/AREA URNS HPF: 7 /HPF (ref 0–5)

## 2024-06-24 PROCEDURE — 83605 ASSAY OF LACTIC ACID: CPT | Performed by: EMERGENCY MEDICINE

## 2024-06-24 PROCEDURE — 25000003 PHARM REV CODE 250: Performed by: EMERGENCY MEDICINE

## 2024-06-24 PROCEDURE — 25500020 PHARM REV CODE 255: Performed by: FAMILY MEDICINE

## 2024-06-24 PROCEDURE — A9698 NON-RAD CONTRAST MATERIALNOC: HCPCS | Performed by: FAMILY MEDICINE

## 2024-06-24 PROCEDURE — 63600175 PHARM REV CODE 636 W HCPCS: Performed by: EMERGENCY MEDICINE

## 2024-06-24 PROCEDURE — 96361 HYDRATE IV INFUSION ADD-ON: CPT

## 2024-06-24 PROCEDURE — 74150 CT ABDOMEN W/O CONTRAST: CPT | Mod: TC

## 2024-06-24 PROCEDURE — 80053 COMPREHEN METABOLIC PANEL: CPT | Performed by: EMERGENCY MEDICINE

## 2024-06-24 PROCEDURE — 81000 URINALYSIS NONAUTO W/SCOPE: CPT | Performed by: NURSE PRACTITIONER

## 2024-06-24 PROCEDURE — 85025 COMPLETE CBC W/AUTO DIFF WBC: CPT | Performed by: NURSE PRACTITIONER

## 2024-06-24 PROCEDURE — 83690 ASSAY OF LIPASE: CPT | Performed by: NURSE PRACTITIONER

## 2024-06-24 PROCEDURE — 74150 CT ABDOMEN W/O CONTRAST: CPT | Mod: 26,,, | Performed by: RADIOLOGY

## 2024-06-24 PROCEDURE — 99285 EMERGENCY DEPT VISIT HI MDM: CPT | Mod: 25

## 2024-06-24 RX ORDER — DICYCLOMINE HYDROCHLORIDE 10 MG/1
20 CAPSULE ORAL
Status: COMPLETED | OUTPATIENT
Start: 2024-06-24 | End: 2024-06-24

## 2024-06-24 RX ORDER — LIDOCAINE HYDROCHLORIDE 40 MG/ML
2 INJECTION, SOLUTION RETROBULBAR
Status: COMPLETED | OUTPATIENT
Start: 2024-06-24 | End: 2024-06-25

## 2024-06-24 RX ORDER — LOPERAMIDE HYDROCHLORIDE 2 MG/1
4 CAPSULE ORAL
Status: COMPLETED | OUTPATIENT
Start: 2024-06-24 | End: 2024-06-24

## 2024-06-24 RX ADMIN — LOPERAMIDE HYDROCHLORIDE 4 MG: 2 CAPSULE ORAL at 10:06

## 2024-06-24 RX ADMIN — DICYCLOMINE HYDROCHLORIDE 20 MG: 10 CAPSULE ORAL at 10:06

## 2024-06-24 RX ADMIN — SODIUM CHLORIDE, POTASSIUM CHLORIDE, SODIUM LACTATE AND CALCIUM CHLORIDE 1000 ML: 600; 310; 30; 20 INJECTION, SOLUTION INTRAVENOUS at 11:06

## 2024-06-24 RX ADMIN — IOHEXOL 500 ML: 9 SOLUTION ORAL at 04:06

## 2024-06-24 NOTE — TELEPHONE ENCOUNTER
Pt was seen by Dr. Milian on 6/12 for gastric pains and told to follow-up with Dr. Milian for an x-ray order if the pains continue. Please advise.

## 2024-06-24 NOTE — Clinical Note
Diagnosis: SBO (small bowel obstruction) [848564]   Future Attending Provider: MONIKA ALAS [[id number]]

## 2024-06-25 VITALS
BODY MASS INDEX: 29.08 KG/M2 | RESPIRATION RATE: 18 BRPM | OXYGEN SATURATION: 99 % | HEART RATE: 71 BPM | WEIGHT: 158 LBS | SYSTOLIC BLOOD PRESSURE: 171 MMHG | DIASTOLIC BLOOD PRESSURE: 70 MMHG | HEIGHT: 62 IN | TEMPERATURE: 98 F

## 2024-06-25 PROBLEM — K43.6 VENTRAL HERNIA WITH OBSTRUCTION AND WITHOUT GANGRENE: Status: ACTIVE | Noted: 2024-06-25

## 2024-06-25 LAB
C DIFF GDH STL QL: NEGATIVE
C DIFF TOX A+B STL QL IA: NEGATIVE

## 2024-06-25 PROCEDURE — G0378 HOSPITAL OBSERVATION PER HR: HCPCS

## 2024-06-25 PROCEDURE — 96374 THER/PROPH/DIAG INJ IV PUSH: CPT

## 2024-06-25 PROCEDURE — 25000003 PHARM REV CODE 250: Performed by: EMERGENCY MEDICINE

## 2024-06-25 PROCEDURE — 63600175 PHARM REV CODE 636 W HCPCS: Performed by: EMERGENCY MEDICINE

## 2024-06-25 PROCEDURE — 99222 1ST HOSP IP/OBS MODERATE 55: CPT | Mod: ,,, | Performed by: STUDENT IN AN ORGANIZED HEALTH CARE EDUCATION/TRAINING PROGRAM

## 2024-06-25 PROCEDURE — 96361 HYDRATE IV INFUSION ADD-ON: CPT

## 2024-06-25 PROCEDURE — 87324 CLOSTRIDIUM AG IA: CPT | Performed by: EMERGENCY MEDICINE

## 2024-06-25 RX ORDER — LORAZEPAM 2 MG/ML
1 INJECTION INTRAMUSCULAR
Status: COMPLETED | OUTPATIENT
Start: 2024-06-25 | End: 2024-06-25

## 2024-06-25 RX ORDER — CYCLOSPORINE 0.5 MG/ML
1 EMULSION OPHTHALMIC
COMMUNITY
End: 2024-06-25 | Stop reason: SDUPTHER

## 2024-06-25 RX ORDER — PROMETHAZINE HYDROCHLORIDE 25 MG/1
25 TABLET ORAL EVERY 6 HOURS PRN
Status: DISCONTINUED | OUTPATIENT
Start: 2024-06-25 | End: 2024-06-25 | Stop reason: HOSPADM

## 2024-06-25 RX ORDER — PEDIATRIC MULTIVITAMIN NO.238
2 TABLET,CHEWABLE ORAL DAILY
COMMUNITY

## 2024-06-25 RX ORDER — DEXTROMETHORPHAN HYDROBROMIDE, GUAIFENESIN 5; 100 MG/5ML; MG/5ML
650 LIQUID ORAL EVERY 8 HOURS PRN
COMMUNITY

## 2024-06-25 RX ORDER — DICLOFENAC SODIUM 10 MG/G
2 GEL TOPICAL
COMMUNITY
Start: 2023-07-22 | End: 2024-06-25 | Stop reason: SDUPTHER

## 2024-06-25 RX ORDER — TRIAMCINOLONE ACETONIDE 1 MG/G
CREAM TOPICAL DAILY PRN
COMMUNITY
Start: 2024-05-14

## 2024-06-25 RX ORDER — ONDANSETRON 8 MG/1
8 TABLET, ORALLY DISINTEGRATING ORAL EVERY 8 HOURS PRN
Status: DISCONTINUED | OUTPATIENT
Start: 2024-06-25 | End: 2024-06-25 | Stop reason: HOSPADM

## 2024-06-25 RX ORDER — HYDROCODONE BITARTRATE AND ACETAMINOPHEN 5; 325 MG/1; MG/1
1 TABLET ORAL EVERY 4 HOURS PRN
Status: DISCONTINUED | OUTPATIENT
Start: 2024-06-25 | End: 2024-06-25 | Stop reason: HOSPADM

## 2024-06-25 RX ORDER — MORPHINE SULFATE 4 MG/ML
4 INJECTION, SOLUTION INTRAMUSCULAR; INTRAVENOUS EVERY 4 HOURS PRN
Status: DISCONTINUED | OUTPATIENT
Start: 2024-06-25 | End: 2024-06-25 | Stop reason: HOSPADM

## 2024-06-25 RX ORDER — TALC
6 POWDER (GRAM) TOPICAL NIGHTLY PRN
Status: DISCONTINUED | OUTPATIENT
Start: 2024-06-25 | End: 2024-06-25 | Stop reason: HOSPADM

## 2024-06-25 RX ORDER — ACETAMINOPHEN 325 MG/1
650 TABLET ORAL EVERY 8 HOURS PRN
Status: DISCONTINUED | OUTPATIENT
Start: 2024-06-25 | End: 2024-06-25 | Stop reason: HOSPADM

## 2024-06-25 RX ADMIN — LIDOCAINE HYDROCHLORIDE 2 ML: 40 INJECTION, SOLUTION RETROBULBAR; TOPICAL at 12:06

## 2024-06-25 RX ADMIN — TOPICAL ANESTHETIC: 200 SPRAY DENTAL; PERIODONTAL at 12:06

## 2024-06-25 RX ADMIN — LORAZEPAM 1 MG: 2 INJECTION INTRAMUSCULAR; INTRAVENOUS at 12:06

## 2024-06-25 NOTE — ED NOTES
Pt given specimen cup to try and get a sample of bm. Pt reports that she was not able to make a bm at this time.

## 2024-06-25 NOTE — H&P
Patient ID: Flor Erwin is a 74 y.o. female.    Chief Complaint: Abdominal Pain (X10 days of generalized abd pain and diarrhea with nausea. Pt denies vomiting, CP, or SOB. )      HPI:  HPI  74F with epigastric pain. Some NV yesterday. Never had this before. Hx of laparotomy many years ago at either [facility] or  but she does not know why.   Had BM yesterday. Never been told she has a hernia.   On eliquis    Review of Systems   Constitutional:  Negative for fever.   HENT:  Negative for trouble swallowing.    Respiratory:  Negative for shortness of breath.    Cardiovascular:  Negative for chest pain.   Gastrointestinal:  Positive for abdominal pain, nausea and vomiting. Negative for blood in stool.   Genitourinary:  Negative for dysuria.   Musculoskeletal:  Negative for gait problem.   Skin:  Negative for rash and wound.   Allergic/Immunologic: Negative for immunocompromised state.   Neurological:  Negative for weakness.   Hematological:  Does not bruise/bleed easily.   Psychiatric/Behavioral:  Negative for agitation.        No current facility-administered medications for this encounter.     Current Outpatient Medications   Medication Sig Dispense Refill    amlodipine-benazepril 10-20mg (LOTREL) 10-20 mg per capsule Take 1 capsule by mouth once daily. 90 capsule 2    apixaban (ELIQUIS) 5 mg Tab Take 1 tablet (5 mg total) by mouth 2 (two) times daily. 180 tablet 3    cetirizine (ZYRTEC) 10 MG tablet Take 1 tablet (10 mg total) by mouth once daily. 30 tablet 2    clobetasol 0.05% (TEMOVATE) 0.05 % Oint Apply topically Daily. 45 g 1    cycloSPORINE (RESTASIS) 0.05 % ophthalmic emulsion Place 1 drop into both eyes 2 (two) times daily.      diclofenac sodium (VOLTAREN) 1 % Gel Apply 2 g topically 2 (two) times daily. 50 g 1    donepeziL (ARICEPT) 5 MG tablet Take 1 tablet (5 mg total) by mouth every evening. 90 tablet 1    estradioL (ESTRACE) 0.01 % (0.1 mg/gram) vaginal cream Place 1 g vaginally once daily. 42.5 g  3    finasteride (PROPECIA) 1 mg tablet Take 1 mg by mouth.      fluticasone propionate (FLONASE) 50 mcg/actuation nasal spray 1 spray (50 mcg total) by Each Nostril route once daily. 16 g 1    ibuprofen (ADVIL,MOTRIN) 600 MG tablet Take 1 tablet (600 mg total) by mouth every 8 (eight) hours as needed for Pain. 30 tablet 1    levothyroxine (SYNTHROID) 88 MCG tablet Take 88 mcg by mouth before breakfast.      methocarbamoL (ROBAXIN) 500 MG Tab Take 1 tablet (500 mg total) by mouth nightly as needed (spasms). 30 tablet 0    omeprazole (PRILOSEC) 40 MG capsule Take 1 capsule (40 mg total) by mouth once daily. 30 capsule 1    oxybutynin (DITROPAN-XL) 5 MG TR24 TAKE 2 TABLETS ONCE DAILY 180 tablet 3    pramoxine (SARNA SENSITIVE) 1 % Lotn Apply 1 application topically 2 (two) times a day. 222 mL 2    propylene glycoL (SYSTANE BALANCE) 0.6 % Drop Apply 2 drops to eye 2 (two) times daily. 3 Bottle 3    rosuvastatin (CRESTOR) 5 MG tablet TAKE 1 TABLET DAILY 90 tablet 3    triamcinolone acetonide 0.1% (KENALOG) 0.1 % ointment Apply topically 2 (two) times daily as needed (itching). 30 g 2     Facility-Administered Medications Ordered in Other Encounters   Medication Dose Route Frequency Provider Last Rate Last Admin    simethicone 40 mg/0.6 mL drops    PRN Prince Cummings MD   40 mg at 08/29/22 0938       Review of patient's allergies indicates:   Allergen Reactions    Penicillins Hives    Hydrochlorothiazide Itching       Past Medical History:   Diagnosis Date    Hypertension        Past Surgical History:   Procedure Laterality Date    ARTHROSCOPIC REPAIR OF ROTATOR CUFF OF SHOULDER Left 5/28/2021    Procedure: REPAIR, ROTATOR CUFF, ARTHROSCOPIC, AC resection;  Surgeon: Michael Short Jr., MD;  Location: [facility];  Service: Orthopedics;  Laterality: Left;  need opus system  Ahsan notified 5/11/21 CC    ARTHROSCOPY OF SHOULDER WITH DECOMPRESSION OF SUBACROMIAL SPACE  5/28/2021    Procedure: ARTHROSCOPY, SHOULDER, WITH  SUBACROMIAL SPACE DECOMPRESSION;  Surgeon: Michael Short Jr., MD;  Location: [facility] OR;  Service: Orthopedics;;    CHOLECYSTECTOMY      COLONOSCOPY N/A 8/29/2022    Procedure: COLONOSCOPY Suprep;  Surgeon: Prince Cummings MD;  Location: [facility] ENDO;  Service: Endoscopy;  Laterality: N/A;    HYSTERECTOMY      partial in her 40's    OOPHORECTOMY         Social History     Socioeconomic History    Marital status:    Tobacco Use    Smoking status: Never    Smokeless tobacco: Never   Substance and Sexual Activity    Alcohol use: No    Drug use: No    Sexual activity: Not Currently     Social Determinants of Health     Financial Resource Strain: Low Risk  (9/6/2022)    Overall Financial Resource Strain (CARDIA)     Difficulty of Paying Living Expenses: Not very hard   Food Insecurity: No Food Insecurity (9/6/2022)    Hunger Vital Sign     Worried About Running Out of Food in the Last Year: Never true     Ran Out of Food in the Last Year: Never true   Transportation Needs: No Transportation Needs (9/6/2022)    PRAPARE - Transportation     Lack of Transportation (Medical): No     Lack of Transportation (Non-Medical): No   Physical Activity: Sufficiently Active (9/6/2022)    Exercise Vital Sign     Days of Exercise per Week: 7 days     Minutes of Exercise per Session: 30 min   Stress: No Stress Concern Present (9/6/2022)    [employer] - Occupational Stress Questionnaire     Feeling of Stress : Not at all   Housing Stability: Low Risk  (9/6/2022)    Housing Stability Vital Sign     Unable to Pay for Housing in the Last Year: No     Number of Places Lived in the Last Year: 1     Unstable Housing in the Last Year: No       Vitals:    06/25/24 0815   BP: (!) 180/79   Pulse: 70   Resp:    Temp:        Physical Exam  Constitutional:       General: She is not in acute distress.     Appearance: She is well-developed.   HENT:      Head: Normocephalic and atraumatic.   Eyes:      General: No scleral  icterus.  Cardiovascular:      Rate and Rhythm: Normal rate.   Pulmonary:      Effort: Pulmonary effort is normal.      Breath sounds: No stridor.   Abdominal:      General: There is no distension.      Palpations: Abdomen is soft.      Tenderness: There is no abdominal tenderness.      Hernia: A hernia is present.       Lymphadenopathy:      Cervical: No cervical adenopathy.   Skin:     General: Skin is warm.      Findings: No erythema.   Neurological:      Mental Status: She is alert and oriented to person, place, and time.   Psychiatric:         Behavior: Behavior normal.     Body mass index is 28.9 kg/m².  CT shows ventral hernia with small bowel. Gallbladder absent, possible bowel stapleline in RLQ?  WBC normal      Assessment & Plan:  74F with ventral hernia, reducible  Will clamp NG and start clears  Hold eliquis for now

## 2024-06-25 NOTE — ED PROVIDER NOTES
Encounter Date: 6/24/2024       History     Chief Complaint   Patient presents with    Abdominal Pain     X10 days of generalized abd pain and diarrhea with nausea. Pt denies vomiting, CP, or SOB.      74-year-old female history hypertension, prior cholecystectomy presents for intermittent abdominal pain across the upper abdomen sharp, waxing and waning in severity without identified modifying factors ongoing for the past 8-10 days associated with nonbloody diarrhea, occasional nausea but no vomiting.  Seen by PCP, CT scan ordered and performed earlier today but no results as of yet    The history is provided by the patient.     Review of patient's allergies indicates:   Allergen Reactions    Penicillins Hives    Hydrochlorothiazide Itching     Past Medical History:   Diagnosis Date    Hypertension      Past Surgical History:   Procedure Laterality Date    ARTHROSCOPIC REPAIR OF ROTATOR CUFF OF SHOULDER Left 5/28/2021    Procedure: REPAIR, ROTATOR CUFF, ARTHROSCOPIC, AC resection;  Surgeon: Michael Short Jr., MD;  Location: [facility] OR;  Service: Orthopedics;  Laterality: Left;  need opus system  Mosque notified 5/11/21 CC    ARTHROSCOPY OF SHOULDER WITH DECOMPRESSION OF SUBACROMIAL SPACE  5/28/2021    Procedure: ARTHROSCOPY, SHOULDER, WITH SUBACROMIAL SPACE DECOMPRESSION;  Surgeon: Michael Short Jr., MD;  Location: [facility] OR;  Service: Orthopedics;;    CHOLECYSTECTOMY      COLONOSCOPY N/A 8/29/2022    Procedure: COLONOSCOPY Suprep;  Surgeon: Prince Cummings MD;  Location: [facility] ENDO;  Service: Endoscopy;  Laterality: N/A;    HYSTERECTOMY      partial in her 40's    OOPHORECTOMY       Family History   Problem Relation Name Age of Onset    Hyperlipidemia Mother      Heart attack Mother      Coronary artery disease Mother      Diabetes Father      Diabetes Mellitus Father      Peripheral vascular disease Father      No Known Problems Sister x1 -drowned     Heart disease Sister x1     Hypertension Sister x1      Hypertension Brother x2     Peripheral vascular disease Brother x2     No Known Problems Daughter x2     No Known Problems Son x1      Social History     Tobacco Use    Smoking status: Never    Smokeless tobacco: Never   Substance Use Topics    Alcohol use: No    Drug use: No     Review of Systems    Physical Exam     Initial Vitals [06/24/24 1839]   BP Pulse Resp Temp SpO2   (!) 140/69 78 19 97.8 °F (36.6 °C) 100 %      MAP       --         Physical Exam    Nursing note and vitals reviewed.  Constitutional: She appears well-developed and well-nourished. No distress.   HENT:   Head: Normocephalic and atraumatic.   Mouth/Throat: Oropharynx is clear and moist.   Eyes: Conjunctivae and EOM are normal. Pupils are equal, round, and reactive to light.   Neck: Neck supple.   Normal range of motion.  Cardiovascular:  Normal rate, regular rhythm and intact distal pulses.           Pulmonary/Chest: Breath sounds normal. No stridor. No respiratory distress.   Abdominal: Abdomen is soft. She exhibits distension. There is abdominal tenderness.   Mild upper abdominal distention or fullness mild tenderness, no rebound or guarding.   Musculoskeletal:         General: Normal range of motion.      Cervical back: Normal range of motion and neck supple.      Comments: Moving all extremities with grossly equal strength     Neurological: She is alert and oriented to person, place, and time.   CN 2-12 appear grossly intact   Skin: Skin is warm and dry.   Psychiatric: She has a normal mood and affect.         ED Course   Procedures  Labs Reviewed   CBC W/ AUTO DIFFERENTIAL - Abnormal; Notable for the following components:       Result Value    RDW 14.9 (*)     All other components within normal limits   URINALYSIS, REFLEX TO URINE CULTURE - Abnormal; Notable for the following components:    Appearance, UA Hazy (*)     Ketones, UA Trace (*)     Occult Blood UA Trace (*)     Leukocytes, UA Trace (*)     All other components within normal  limits    Narrative:     Specimen Source->Urine   COMPREHENSIVE METABOLIC PANEL - Abnormal; Notable for the following components:    eGFR 58 (*)     All other components within normal limits   URINALYSIS MICROSCOPIC - Abnormal; Notable for the following components:    WBC, UA 7 (*)     All other components within normal limits    Narrative:     Specimen Source->Urine   CLOSTRIDIUM DIFFICILE   LIPASE   LACTIC ACID, PLASMA          Imaging Results              XR NG/OG tube placement check, non-radiologist performed (In process)    Procedure changed from X-Ray Chest AP Portable                    X-Ray Chest AP Portable (Final result)  Result time 06/25/24 00:08:26      Final result by Rosibel Castillo MD (06/25/24 00:08:26)                   Impression:      No acute abnormality.      Electronically signed by: Rosibel Castillo  Date:    06/25/2024  Time:    00:08               Narrative:    EXAMINATION:  XR CHEST AP PORTABLE    CLINICAL HISTORY:  ngt placement;    TECHNIQUE:  Single frontal view of the chest was performed.    COMPARISON:  12/25/2019 chest x-ray    FINDINGS:  Cardiac silhouette is not enlarged.  Lungs are clear.  There is no pleural effusion or pneumo thorax.  Degenerative changes of the shoulders and spine.                                       Medications   dicyclomine capsule 20 mg (20 mg Oral Given 6/24/24 2257)   loperamide capsule 4 mg (4 mg Oral Given 6/24/24 2257)   lactated ringers bolus 1,000 mL (0 mLs Intravenous Stopped 6/25/24 0139)   benzocaine 20 % oral spray ( Mouth/Throat Given 6/25/24 0019)   LIDOcaine (PF) 40 mg/mL (4 %) injection 2 mL (2 mLs Nebulization Given 6/25/24 0019)   LORazepam injection 1 mg (1 mg Intravenous Given 6/25/24 0022)     Medical Decision Making  Well-appearing nontoxic normal vitals.  Evaluate for ileus, obstructive process, diverticulitis.  Rule out C diff.  Evaluate for electrolyte abnormalities.    Amount and/or Complexity of Data Reviewed  External Data  Reviewed: radiology.     Details: SBO  Labs: ordered. Decision-making details documented in ED Course.  Radiology: ordered and independent interpretation performed. Decision-making details documented in ED Course.    Risk  OTC drugs.  Prescription drug management.  Decision regarding hospitalization.    Critical Care  Total time providing critical care: 35 minutes               ED Course as of 06/25/24 0415 Mon Jun 24, 2024 2337 CBC essentially normal no significant anemia or leukocytosis.  Chemistry panel reassuring no significant abnormalities.  Normal lipase.  Urinalysis trace leukocyte blood ketones, some squamous epithelial cells likely contaminant no obvious UTI [AP]   2337 I reviewed the patient's CT imaging from her outpatient study earlier today and agree Radiology interpretation small bowel obstruction [AP]   2338 Case discussed with general surgery Dr. Juarez, requests NG tube and will admit the patient for further management [AP]   Tue Jun 25, 2024 0335 Normal lactate.  C diff negative. [AP]   0414 Chest x-ray reviewed independently NG tube appears to be in the stomach [AP]      ED Course User Index  [AP] Murtaza Gtz, DO                           Clinical Impression:  Final diagnoses:  [K56.609] SBO (small bowel obstruction) (Primary)  [K43.6] Ventral hernia with obstruction and without gangrene  [Z46.59] Encounter for nasogastric (NG) tube placement          ED Disposition Condition    Observation Stable                Murtaza Gtz,   06/24/24 2339       Murtaza Gtz,   06/25/24 0335       Murtaza Gtz,   06/25/24 0415

## 2024-06-25 NOTE — ED TRIAGE NOTES
Pt comes in coming of abdominal; pain x10 days. Reports that's he saw her primary care provider ad they gave her medications that she does not think is working. Pt also reports diarrhea. Denies any chest pain, sob, or headache. Also denies n/v. AAO x4. Ambulatory with even and steady gait.

## 2024-06-25 NOTE — PHARMACY MED REC
"Admission Medication History     The home medication history was taken by Madison Marino CPhT.    Medication history obtained from, Patient Verified    You may go to "Admission" then "Reconcile Home Medications" tabs to review and/or act upon these items.     The home medication list has been updated by the Pharmacy department.   Please read ALL comments highlighted in yellow.   Please address this information as you see fit.    Feel free to contact us if you have any questions or require assistance.      The medications listed below were removed from the home medication list.  Please reorder if appropriate:  Patient reports no longer taking the following medication(s):  Clobetasol 0.05% ointment         Madison Marino CPhT.  Ext [phone]               .          "

## 2024-06-26 NOTE — DISCHARGE SUMMARY
[facility] - Emergency Dept  Short Stay  Discharge Summary    Admit Date: 6/24/2024    Discharge Date and Time: 6/25/2024  5:03 PM      Discharge Attending Physician: No att. providers found     Hospital Course (synopsis of major diagnoses, care, treatment, and services provided during the course of the hospital stay): 74F presented with ventral hernia, vomiting for about a day. NV resolved. Hernia was easily reduced. She will follow up with me in the office.     Final Diagnoses:    Principal Problem: Ventral hernia with obstruction and without gangrene   Secondary Diagnoses:   Active Hospital Problems    Diagnosis  POA    *Ventral hernia with obstruction and without gangrene [K43.6]  Yes      Resolved Hospital Problems   No resolved problems to display.       Discharged Condition: stable    Disposition: Home or Self Care    Follow up/Patient Instructions:    Medications:  Reconciled Home Medications:      Medication List        ASK your doctor about these medications      acetaminophen 650 MG Tbsr  Commonly known as: TYLENOL  Take 650 mg by mouth every 8 (eight) hours as needed.     ADULT MULTIVITAMIN GUMMIES 200 mcg Chew  Generic drug: multivit with min-folic acid  Take 2 tablets by mouth once daily. Vitafusion     amlodipine-benazepril 10-20mg 10-20 mg per capsule  Commonly known as: LOTREL  Take 1 capsule by mouth once daily.     apixaban 5 mg Tab  Commonly known as: ELIQUIS  Take 1 tablet (5 mg total) by mouth 2 (two) times daily.     cetirizine 10 MG tablet  Commonly known as: ZYRTEC  Take 1 tablet (10 mg total) by mouth once daily.     cycloSPORINE 0.05 % ophthalmic emulsion  Commonly known as: RESTASIS  Place 1 drop into both eyes 2 (two) times daily.  Ask about: Which instructions should I use?     diclofenac sodium 1 % Gel  Commonly known as: VOLTAREN  Apply 2 g topically 2 (two) times daily.  Ask about: Which instructions should I use?     donepeziL 5 MG tablet  Commonly known as: ARICEPT  Take 1 tablet (5 mg  total) by mouth every evening.     estradioL 0.01 % (0.1 mg/gram) vaginal cream  Commonly known as: ESTRACE  Place 1 g vaginally once daily.     finasteride 1 mg tablet  Commonly known as: PROPECIA  Take 1 mg by mouth once daily.     fluticasone propionate 50 mcg/actuation nasal spray  Commonly known as: FLONASE  1 spray (50 mcg total) by Each Nostril route once daily.     ibuprofen 600 MG tablet  Commonly known as: ADVIL,MOTRIN  Take 1 tablet (600 mg total) by mouth every 8 (eight) hours as needed for Pain.     levothyroxine 88 MCG tablet  Commonly known as: SYNTHROID  Take 88 mcg by mouth before breakfast.     methocarbamoL 500 MG Tab  Commonly known as: ROBAXIN  Take 1 tablet (500 mg total) by mouth nightly as needed (spasms).     omeprazole 40 MG capsule  Commonly known as: PRILOSEC  Take 1 capsule (40 mg total) by mouth once daily.     oxybutynin 5 MG Tr24  Commonly known as: DITROPAN-XL  TAKE 2 TABLETS ONCE DAILY     pramoxine 1 % Lotn  Commonly known as: SARNA SENSITIVE  Apply 1 application topically 2 (two) times a day.     rosuvastatin 5 MG tablet  Commonly known as: CRESTOR  TAKE 1 TABLET DAILY     SYSTANE BALANCE 0.6 % Drop  Generic drug: propylene glycoL  Apply 2 drops to eye 2 (two) times daily.     triamcinolone acetonide 0.1% 0.1 % cream  Commonly known as: KENALOG  Apply topically daily as needed.  Ask about: Which instructions should I use?            No discharge procedures on file.   Follow-up Information       Braulio Juarez MD Follow up in 1 week(s).    Specialties: Surgery, General Surgery  Contact information:  [address]  [address]  [place] [address] [phone]

## 2024-06-27 ENCOUNTER — PATIENT OUTREACH (OUTPATIENT)
Dept: ADMINISTRATIVE | Facility: CLINIC | Age: 74
End: 2024-06-27
Payer: MEDICARE

## 2024-06-28 ENCOUNTER — TELEPHONE (OUTPATIENT)
Dept: FAMILY MEDICINE | Facility: CLINIC | Age: 74
End: 2024-06-28
Payer: MEDICARE

## 2024-06-28 NOTE — TELEPHONE ENCOUNTER
----- Message from Emely Spaulding sent at 6/28/2024  1:58 PM CDT -----  Regarding: Pt called to speak to the nurse to schedule an ER follow up appt today for a small bowel obstruction and stomach pain and pt would like a call back today asap  Same Day Appointment Access Request:    Pt called to speak to the nurse to schedule an ER follow up appt today for a small bowel obstruction and stomach pain and pt would like a call back today asap    Pt can be reached at  [phone]

## 2024-07-24 ENCOUNTER — TELEPHONE (OUTPATIENT)
Dept: FAMILY MEDICINE | Facility: CLINIC | Age: 74
End: 2024-07-24
Payer: MEDICARE

## 2024-07-24 DIAGNOSIS — Z12.31 ENCOUNTER FOR SCREENING MAMMOGRAM FOR BREAST CANCER: Primary | ICD-10-CM

## 2024-07-25 ENCOUNTER — HOSPITAL ENCOUNTER (OUTPATIENT)
Dept: RADIOLOGY | Facility: HOSPITAL | Age: 74
Discharge: HOME | End: 2024-07-25
Attending: FAMILY MEDICINE
Payer: MEDICARE

## 2024-07-25 DIAGNOSIS — Z12.31 ENCOUNTER FOR SCREENING MAMMOGRAM FOR BREAST CANCER: ICD-10-CM

## 2024-07-25 PROCEDURE — 77067 SCR MAMMO BI INCL CAD: CPT | Mod: TC

## 2024-08-21 DIAGNOSIS — K21.9 GASTROESOPHAGEAL REFLUX DISEASE, UNSPECIFIED WHETHER ESOPHAGITIS PRESENT: ICD-10-CM

## 2024-08-21 RX ORDER — OMEPRAZOLE 40 MG/1
CAPSULE, DELAYED RELEASE ORAL
Qty: 30 CAPSULE | Refills: 1 | Status: SHIPPED | OUTPATIENT
Start: 2024-08-21

## 2024-08-27 ENCOUNTER — LAB VISIT (OUTPATIENT)
Dept: LAB | Facility: HOSPITAL | Age: 74
End: 2024-08-27
Payer: MEDICARE

## 2024-08-27 DIAGNOSIS — Z00.00 ROUTINE GENERAL MEDICAL EXAMINATION AT A HEALTH CARE FACILITY: ICD-10-CM

## 2024-08-27 DIAGNOSIS — I10 ESSENTIAL HYPERTENSION: ICD-10-CM

## 2024-08-27 DIAGNOSIS — I70.0 AORTIC ATHEROSCLEROSIS: ICD-10-CM

## 2024-08-27 DIAGNOSIS — R73.01 IMPAIRED FASTING GLUCOSE: ICD-10-CM

## 2024-08-27 DIAGNOSIS — E03.9 HYPOTHYROIDISM: ICD-10-CM

## 2024-08-27 DIAGNOSIS — I10 ESSENTIAL HYPERTENSION, BENIGN: Primary | ICD-10-CM

## 2024-08-27 LAB
ALBUMIN SERPL BCP-MCNC: 3.8 G/DL (ref 3.5–5.2)
ALBUMIN SERPL BCP-MCNC: 3.8 G/DL (ref 3.5–5.2)
ALP SERPL-CCNC: 91 U/L (ref 55–135)
ALP SERPL-CCNC: 91 U/L (ref 55–135)
ALT SERPL W/O P-5'-P-CCNC: 10 U/L (ref 10–44)
ALT SERPL W/O P-5'-P-CCNC: 10 U/L (ref 10–44)
ANION GAP SERPL CALC-SCNC: 8 MMOL/L (ref 8–16)
ANION GAP SERPL CALC-SCNC: 8 MMOL/L (ref 8–16)
AST SERPL-CCNC: 15 U/L (ref 10–40)
AST SERPL-CCNC: 15 U/L (ref 10–40)
BASOPHILS # BLD AUTO: 0.04 K/UL (ref 0–0.2)
BASOPHILS NFR BLD: 0.8 % (ref 0–1.9)
BILIRUB SERPL-MCNC: 0.4 MG/DL (ref 0.1–1)
BILIRUB SERPL-MCNC: 0.4 MG/DL (ref 0.1–1)
BUN SERPL-MCNC: 12 MG/DL (ref 8–23)
BUN SERPL-MCNC: 12 MG/DL (ref 8–23)
CALCIUM SERPL-MCNC: 9.1 MG/DL (ref 8.7–10.5)
CALCIUM SERPL-MCNC: 9.1 MG/DL (ref 8.7–10.5)
CHLORIDE SERPL-SCNC: 110 MMOL/L (ref 95–110)
CHLORIDE SERPL-SCNC: 110 MMOL/L (ref 95–110)
CHOLEST SERPL-MCNC: 158 MG/DL (ref 120–199)
CHOLEST/HDLC SERPL: 2 {RATIO} (ref 2–5)
CO2 SERPL-SCNC: 24 MMOL/L (ref 23–29)
CO2 SERPL-SCNC: 24 MMOL/L (ref 23–29)
CREAT SERPL-MCNC: 0.9 MG/DL (ref 0.5–1.4)
CREAT SERPL-MCNC: 0.9 MG/DL (ref 0.5–1.4)
DIFFERENTIAL METHOD BLD: ABNORMAL
EOSINOPHIL # BLD AUTO: 0.1 K/UL (ref 0–0.5)
EOSINOPHIL NFR BLD: 2.7 % (ref 0–8)
ERYTHROCYTE [DISTWIDTH] IN BLOOD BY AUTOMATED COUNT: 14.8 % (ref 11.5–14.5)
EST. GFR  (NO RACE VARIABLE): >60 ML/MIN/1.73 M^2
EST. GFR  (NO RACE VARIABLE): >60 ML/MIN/1.73 M^2
GLUCOSE SERPL-MCNC: 108 MG/DL (ref 70–110)
GLUCOSE SERPL-MCNC: 108 MG/DL (ref 70–110)
HCT VFR BLD AUTO: 38.6 % (ref 37–48.5)
HDLC SERPL-MCNC: 79 MG/DL (ref 40–75)
HDLC SERPL: 50 % (ref 20–50)
HGB BLD-MCNC: 12.6 G/DL (ref 12–16)
IMM GRANULOCYTES # BLD AUTO: 0.04 K/UL (ref 0–0.04)
IMM GRANULOCYTES NFR BLD AUTO: 0.8 % (ref 0–0.5)
LDLC SERPL CALC-MCNC: 68 MG/DL (ref 63–159)
LYMPHOCYTES # BLD AUTO: 1.6 K/UL (ref 1–4.8)
LYMPHOCYTES NFR BLD: 31.1 % (ref 18–48)
MCH RBC QN AUTO: 29.4 PG (ref 27–31)
MCHC RBC AUTO-ENTMCNC: 32.6 G/DL (ref 32–36)
MCV RBC AUTO: 90 FL (ref 82–98)
MONOCYTES # BLD AUTO: 0.5 K/UL (ref 0.3–1)
MONOCYTES NFR BLD: 9.6 % (ref 4–15)
NEUTROPHILS # BLD AUTO: 2.8 K/UL (ref 1.8–7.7)
NEUTROPHILS NFR BLD: 55 % (ref 38–73)
NONHDLC SERPL-MCNC: 79 MG/DL
NRBC BLD-RTO: 0 /100 WBC
PLATELET # BLD AUTO: 255 K/UL (ref 150–450)
PMV BLD AUTO: 10.6 FL (ref 9.2–12.9)
POTASSIUM SERPL-SCNC: 3.6 MMOL/L (ref 3.5–5.1)
POTASSIUM SERPL-SCNC: 3.6 MMOL/L (ref 3.5–5.1)
PROT SERPL-MCNC: 7 G/DL (ref 6–8.4)
PROT SERPL-MCNC: 7 G/DL (ref 6–8.4)
RBC # BLD AUTO: 4.29 M/UL (ref 4–5.4)
SODIUM SERPL-SCNC: 142 MMOL/L (ref 136–145)
SODIUM SERPL-SCNC: 142 MMOL/L (ref 136–145)
T3FREE SERPL-MCNC: 2.3 PG/ML (ref 2.3–4.2)
T4 FREE SERPL-MCNC: 1.07 NG/DL (ref 0.71–1.51)
TRIGL SERPL-MCNC: 55 MG/DL (ref 30–150)
TSH SERPL DL<=0.005 MIU/L-ACNC: 7.96 UIU/ML (ref 0.4–4)
WBC # BLD AUTO: 5.11 K/UL (ref 3.9–12.7)

## 2024-08-27 PROCEDURE — 80053 COMPREHEN METABOLIC PANEL: CPT | Performed by: FAMILY MEDICINE

## 2024-08-27 PROCEDURE — 85025 COMPLETE CBC W/AUTO DIFF WBC: CPT | Performed by: FAMILY MEDICINE

## 2024-08-27 PROCEDURE — 80061 LIPID PANEL: CPT | Performed by: FAMILY MEDICINE

## 2024-08-27 PROCEDURE — 84443 ASSAY THYROID STIM HORMONE: CPT | Performed by: INTERNAL MEDICINE

## 2024-08-27 PROCEDURE — 84481 FREE ASSAY (FT-3): CPT | Performed by: INTERNAL MEDICINE

## 2024-08-27 PROCEDURE — 84445 ASSAY OF TSI GLOBULIN: CPT | Performed by: INTERNAL MEDICINE

## 2024-08-27 PROCEDURE — 84439 ASSAY OF FREE THYROXINE: CPT | Performed by: INTERNAL MEDICINE

## 2024-08-30 LAB — TSI SER-ACNC: 0.44 IU/L

## 2024-09-10 ENCOUNTER — TELEPHONE (OUTPATIENT)
Dept: CARDIOLOGY | Facility: CLINIC | Age: 74
End: 2024-09-10
Payer: MEDICARE

## 2024-09-10 NOTE — TELEPHONE ENCOUNTER
Left message to patient. Advice patient to reach out to the office.   Patient last office visit was 04/13/23 patient needs a follow up appointment for any refill rx request.     Mary Lou CHAND     ----- Message from Christy Acosta sent at 9/10/2024 10:48 AM CDT -----  Type:  RX Refill Request    Who Called:  Flor Erwin  Refill or New Rx: Refill   RX Name and Strength:   apixaban (ELIQUIS) 5 mg Tab [[id number]]   How is the patient currently taking it? (ex. 1XDay): Take 1 tablet (5 mg total) by mouth 2 (two) times daily. - Oral  Is this a 30 day or 90 day RX: 180  Preferred Pharmacy with phone number: [facility] - [address]   Phone: [phone]  Fax: [phone]  Local or Mail Order: mail  Ordering Provider: yousef  Would the patient rather a call back or a response via [facility]?  call  Best Call Back Number:  [phone]  Additional Information:

## 2024-09-13 ENCOUNTER — TELEPHONE (OUTPATIENT)
Dept: CARDIOLOGY | Facility: CLINIC | Age: 74
End: 2024-09-13
Payer: MEDICARE

## 2024-09-13 NOTE — TELEPHONE ENCOUNTER
Left message to patient.   Advice patient to call the clinic for any assistance.     Mary Lou CHAND     ----- Message from Candace Valdivia sent at 9/13/2024 12:38 PM CDT -----  Regarding: Call back  Contact: [phone]  Who Called: PT     Patient called in requesting to speak with you. Did not specify why. Please advise.

## 2024-09-25 DIAGNOSIS — Z00.00 ENCOUNTER FOR MEDICARE ANNUAL WELLNESS EXAM: ICD-10-CM

## 2024-09-26 DIAGNOSIS — I48.0 PAROXYSMAL A-FIB: ICD-10-CM

## 2024-09-26 RX ORDER — APIXABAN 5 MG/1
5 TABLET, FILM COATED ORAL 2 TIMES DAILY
Qty: 180 TABLET | Refills: 3 | Status: SHIPPED | OUTPATIENT
Start: 2024-09-26

## 2024-10-14 NOTE — PROGRESS NOTES
Notes: As below  Subjective:   @Patient ID:  Flor Erwin is a 74 y.o. female who presents for follow-up of Atrial fibrillation      HPI:  October 2024:  Follow up.  Has been doing well since last visit.  No palpitation.  No significant chest pain or dyspnea on exertion.  Tolerating Eliquis well without any bleeding issues.  No CVA or TIA.    April 2023:  Here for follow up. Has been doing well since last visit  No chest pain, no palpitation. She is on Eliquis and tolerating it well. No tachy palpitations    No CVA, no TIA    She couldn't complete the event monitor due to allergic reaction to the leads.     Historically:    Patient was hospitalized 12/25 with A.fib with RVR which was a new onset.  Was admitted and converted to SR after Cardizem. Prior to that patient was diagnosed with new hyperthyroidism.     Event  Monitor 7/1/2020 ( Patient didn't complete it)    Negative event monitor with no clinical arrhythmias.           Symptoms corresponding with normal sinus rhythm.      Prior cardiovascular  Hx  --------------------------------       ECHO 12/2019 Normal left ventricular systolic function. The estimated ejection fraction is 65%  Normal LV diastolic function.  No wall motion abnormalities.  Mild aortic regurgitation.  Normal right ventricular systolic function.  Normal central venous pressure (3 mm Hg).  The estimated PA systolic pressure is 36 mm Hg    - EKG sinus sophie, lAE      Patient Active Problem List    Diagnosis Date Noted    Ventral hernia with obstruction and without gangrene 06/25/2024    Decreased range of left hip movement 02/16/2024    Decreased strength of lower extremity 02/16/2024    Aortic atherosclerosis 02/05/2024    Chronic pain of both shoulders 08/21/2023    Arthritis of finger 09/19/2022    Chronic pruritus 09/06/2022    Osteoarthritis of knee 08/25/2022    Dementia without behavioral disturbance, unspecified dementia type 06/20/2022     IMO Regualtory Update 4/1/23      Pain  of left upper extremity 2021    Weakness 2021    Stiffness due to immobility 2021    Nontraumatic complete tear of left rotator cuff 2020    Thyroid eye disease 2020    Refractive error 2020    Cortical cataract of both eyes 2020    Nuclear sclerosis of both eyes 2020    Dry eye syndrome of both eyes 2020    Nonrheumatic aortic valve insufficiency 2020    Paroxysmal A-fib 2019    Hyperthyroidism 2019    Essential hypertension 2019    Polyuria 2/2 hyperthyroidism 2019    Normocytic anemia 2019    Pruritus 2019    Overactive bladder 05/15/2018           Right Arm BP - Sittin/76  Left Arm BP - Sittin/78        LAST HbA1c  Lab Results   Component Value Date    HGBA1C 5.1 2021       Lipid panel  Lab Results   Component Value Date    CHOL 158 2024    CHOL 168 2023    CHOL 169 2020     Lab Results   Component Value Date    HDL 79 (H) 2024    HDL 79 (H) 2023    HDL 43 2020     Lab Results   Component Value Date    LDLCALC 68.0 2024    LDLCALC 76.4 2023    LDLCALC 107.2 2020     Lab Results   Component Value Date    TRIG 55 2024    TRIG 63 2023    TRIG 94 2020     Lab Results   Component Value Date    CHOLHDL 50.0 2024    CHOLHDL 47.0 2023    CHOLHDL 25.4 2020            Review of Systems   Constitutional: Negative for chills and fever.   HENT:  Negative for hearing loss and nosebleeds.    Eyes:  Negative for blurred vision.   Cardiovascular:  Negative for chest pain, dyspnea on exertion and palpitations.   Respiratory:  Negative for hemoptysis and shortness of breath.    Hematologic/Lymphatic: Negative for bleeding problem.   Skin:  Negative for itching.   Musculoskeletal:  Negative for falls.   Gastrointestinal:  Negative for abdominal pain and hematochezia.   Genitourinary:  Negative for hematuria.   Neurological:   Negative for dizziness and loss of balance.   Psychiatric/Behavioral:  Negative for altered mental status and depression.        Objective:   Physical Exam  Constitutional:       Appearance: She is well-developed.   HENT:      Head: Normocephalic and atraumatic.   Eyes:      Comments: Exopthalmos   Neck:      Vascular: No JVD.   Cardiovascular:      Rate and Rhythm: Normal rate and regular rhythm.      Pulses:           Dorsalis pedis pulses are 2+ on the right side and 2+ on the left side.        Posterior tibial pulses are 2+ on the right side and 2+ on the left side.      Heart sounds: Normal heart sounds. No murmur heard.     No friction rub. No gallop.   Pulmonary:      Effort: Pulmonary effort is normal. No respiratory distress.      Breath sounds: Normal breath sounds. No stridor. No wheezing.   Musculoskeletal:      Cervical back: Neck supple.   Skin:     General: Skin is warm and dry.   Neurological:      Mental Status: She is alert and oriented to person, place, and time.   Psychiatric:         Behavior: Behavior normal.            Assessment:     1. Aortic atherosclerosis    2. Essential hypertension    3. Nonrheumatic aortic valve insufficiency    4. Paroxysmal A-fib            Plan:     - CHADVASC is at least 3. Continue NOAC   -  We have discussed risk and benefits of OAC. Given sig risk for recurrence especially in the presence of underlying thyroid disease.  She would like to stay on OAC. Risks and benefits discussed.     - Will monitor for mild AI    - BP is well controlled. F/U with digital HTN clinic   - Evidence of aortic atherosclerosis by CT abd 5/2015.  Continue Crestor 5 mg qhs     - F/U with endocrinology team     Pertinent cardiac images and EKG reviewed independently.    Continue with current medical plan and lifestyle changes.  Return sooner for concerns or questions. If symptoms persist go to the ED  I have reviewed all pertinent data including patient's medical history in detail and  updated the computerized patient record.       1 year f/u     Follow up as scheduled.     She expressed verbal understanding and agreed with the plan    Patient's Medications   New Prescriptions    No medications on file   Previous Medications    ACETAMINOPHEN (TYLENOL) 650 MG TBSR    Take 650 mg by mouth every 8 (eight) hours as needed.    AMLODIPINE-BENAZEPRIL 10-20MG (LOTREL) 10-20 MG PER CAPSULE    Take 1 capsule by mouth once daily.    CETIRIZINE (ZYRTEC) 10 MG TABLET    Take 1 tablet (10 mg total) by mouth once daily.    CYCLOSPORINE (RESTASIS) 0.05 % OPHTHALMIC EMULSION    Place 1 drop into both eyes 2 (two) times daily.    DICLOFENAC SODIUM (VOLTAREN) 1 % GEL    Apply 2 g topically 2 (two) times daily.    DONEPEZIL (ARICEPT) 5 MG TABLET    Take 1 tablet (5 mg total) by mouth every evening.    ESTRADIOL (ESTRACE) 0.01 % (0.1 MG/GRAM) VAGINAL CREAM    Place 1 g vaginally once daily.    FINASTERIDE (PROPECIA) 1 MG TABLET    Take 1 mg by mouth once daily.    FLUTICASONE PROPIONATE (FLONASE) 50 MCG/ACTUATION NASAL SPRAY    1 spray (50 mcg total) by Each Nostril route once daily.    IBUPROFEN (ADVIL,MOTRIN) 600 MG TABLET    Take 1 tablet (600 mg total) by mouth every 8 (eight) hours as needed for Pain.    LEVOTHYROXINE (SYNTHROID) 88 MCG TABLET    Take 88 mcg by mouth before breakfast.    METHOCARBAMOL (ROBAXIN) 500 MG TAB    Take 1 tablet (500 mg total) by mouth nightly as needed (spasms).    MULTIVIT WITH MIN-FOLIC ACID (ADULT MULTIVITAMIN GUMMIES) 200 MCG CHEW    Take 2 tablets by mouth once daily. Vitafusion    OMEPRAZOLE (PRILOSEC) 40 MG CAPSULE    TAKE 1 CAPSULE(40 MG) BY MOUTH DAILY    OXYBUTYNIN (DITROPAN-XL) 5 MG TR24    TAKE 2 TABLETS ONCE DAILY    PRAMOXINE (SARNA SENSITIVE) 1 % LOTN    Apply 1 application topically 2 (two) times a day.    PROPYLENE GLYCOL (SYSTANE BALANCE) 0.6 % DROP    Apply 2 drops to eye 2 (two) times daily.    ROSUVASTATIN (CRESTOR) 5 MG TABLET    TAKE 1 TABLET DAILY     TRIAMCINOLONE ACETONIDE 0.1% (KENALOG) 0.1 % CREAM    Apply topically daily as needed.   Modified Medications    Modified Medication Previous Medication    APIXABAN (ELIQUIS) 5 MG TAB ELIQUIS 5 mg Tab       Take 1 tablet (5 mg total) by mouth 2 (two) times daily.    TAKE 1 TABLET TWICE A DAY   Discontinued Medications    No medications on file

## 2024-10-15 ENCOUNTER — OFFICE VISIT (OUTPATIENT)
Dept: CARDIOLOGY | Facility: CLINIC | Age: 74
End: 2024-10-15
Payer: MEDICARE

## 2024-10-15 ENCOUNTER — OFFICE VISIT (OUTPATIENT)
Dept: FAMILY MEDICINE | Facility: CLINIC | Age: 74
End: 2024-10-15
Payer: MEDICARE

## 2024-10-15 VITALS
SYSTOLIC BLOOD PRESSURE: 112 MMHG | HEART RATE: 72 BPM | BODY MASS INDEX: 29.08 KG/M2 | DIASTOLIC BLOOD PRESSURE: 70 MMHG | HEIGHT: 62 IN | TEMPERATURE: 98 F | WEIGHT: 158.06 LBS | OXYGEN SATURATION: 97 %

## 2024-10-15 VITALS
DIASTOLIC BLOOD PRESSURE: 76 MMHG | WEIGHT: 156 LBS | BODY MASS INDEX: 28.71 KG/M2 | OXYGEN SATURATION: 97 % | SYSTOLIC BLOOD PRESSURE: 122 MMHG | HEIGHT: 62 IN | HEART RATE: 85 BPM

## 2024-10-15 DIAGNOSIS — R10.13 EPIGASTRIC PAIN: ICD-10-CM

## 2024-10-15 DIAGNOSIS — I70.0 AORTIC ATHEROSCLEROSIS: Primary | ICD-10-CM

## 2024-10-15 DIAGNOSIS — I48.0 PAROXYSMAL A-FIB: ICD-10-CM

## 2024-10-15 DIAGNOSIS — R79.9 ABNORMAL BLOOD CHEMISTRY: ICD-10-CM

## 2024-10-15 DIAGNOSIS — Z00.00 ANNUAL PHYSICAL EXAM: Primary | ICD-10-CM

## 2024-10-15 DIAGNOSIS — I10 ESSENTIAL HYPERTENSION: ICD-10-CM

## 2024-10-15 DIAGNOSIS — I35.1 NONRHEUMATIC AORTIC VALVE INSUFFICIENCY: ICD-10-CM

## 2024-10-15 DIAGNOSIS — Z23 ENCOUNTER FOR IMMUNIZATION: ICD-10-CM

## 2024-10-15 DIAGNOSIS — H57.89 THYROID EYE DISEASE: ICD-10-CM

## 2024-10-15 DIAGNOSIS — E03.9 HYPOTHYROIDISM, UNSPECIFIED TYPE: ICD-10-CM

## 2024-10-15 DIAGNOSIS — E66.3 OVERWEIGHT (BMI 25.0-29.9): ICD-10-CM

## 2024-10-15 DIAGNOSIS — E07.9 THYROID EYE DISEASE: ICD-10-CM

## 2024-10-15 DIAGNOSIS — F03.90 DEMENTIA WITHOUT BEHAVIORAL DISTURBANCE: ICD-10-CM

## 2024-10-15 DIAGNOSIS — I48.0 PAROXYSMAL A-FIB: Primary | ICD-10-CM

## 2024-10-15 PROCEDURE — 99999 PR PBB SHADOW E&M-EST. PATIENT-LVL V: CPT | Mod: PBBFAC,,, | Performed by: FAMILY MEDICINE

## 2024-10-15 PROCEDURE — 1159F MED LIST DOCD IN RCRD: CPT | Mod: CPTII,S$GLB,, | Performed by: FAMILY MEDICINE

## 2024-10-15 PROCEDURE — 3288F FALL RISK ASSESSMENT DOCD: CPT | Mod: CPTII,S$GLB,, | Performed by: FAMILY MEDICINE

## 2024-10-15 PROCEDURE — 3074F SYST BP LT 130 MM HG: CPT | Mod: CPTII,S$GLB,, | Performed by: INTERNAL MEDICINE

## 2024-10-15 PROCEDURE — 3288F FALL RISK ASSESSMENT DOCD: CPT | Mod: CPTII,S$GLB,, | Performed by: INTERNAL MEDICINE

## 2024-10-15 PROCEDURE — 1126F AMNT PAIN NOTED NONE PRSNT: CPT | Mod: CPTII,S$GLB,, | Performed by: FAMILY MEDICINE

## 2024-10-15 PROCEDURE — 1101F PT FALLS ASSESS-DOCD LE1/YR: CPT | Mod: CPTII,S$GLB,, | Performed by: INTERNAL MEDICINE

## 2024-10-15 PROCEDURE — 99214 OFFICE O/P EST MOD 30 MIN: CPT | Mod: 25,S$GLB,, | Performed by: INTERNAL MEDICINE

## 2024-10-15 PROCEDURE — 3078F DIAST BP <80 MM HG: CPT | Mod: CPTII,S$GLB,, | Performed by: INTERNAL MEDICINE

## 2024-10-15 PROCEDURE — 1101F PT FALLS ASSESS-DOCD LE1/YR: CPT | Mod: CPTII,S$GLB,, | Performed by: FAMILY MEDICINE

## 2024-10-15 PROCEDURE — 3078F DIAST BP <80 MM HG: CPT | Mod: CPTII,S$GLB,, | Performed by: FAMILY MEDICINE

## 2024-10-15 PROCEDURE — 1159F MED LIST DOCD IN RCRD: CPT | Mod: CPTII,S$GLB,, | Performed by: INTERNAL MEDICINE

## 2024-10-15 PROCEDURE — 93000 ELECTROCARDIOGRAM COMPLETE: CPT | Mod: S$GLB,,, | Performed by: INTERNAL MEDICINE

## 2024-10-15 PROCEDURE — 90653 IIV ADJUVANT VACCINE IM: CPT | Mod: S$GLB,,, | Performed by: FAMILY MEDICINE

## 2024-10-15 PROCEDURE — 99397 PER PM REEVAL EST PAT 65+ YR: CPT | Mod: S$GLB,,, | Performed by: FAMILY MEDICINE

## 2024-10-15 PROCEDURE — G0008 ADMIN INFLUENZA VIRUS VAC: HCPCS | Mod: S$GLB,,, | Performed by: FAMILY MEDICINE

## 2024-10-15 PROCEDURE — 3074F SYST BP LT 130 MM HG: CPT | Mod: CPTII,S$GLB,, | Performed by: FAMILY MEDICINE

## 2024-10-15 PROCEDURE — 99999 PR PBB SHADOW E&M-EST. PATIENT-LVL III: CPT | Mod: PBBFAC,,, | Performed by: INTERNAL MEDICINE

## 2024-10-15 NOTE — PROGRESS NOTES
(Portions of this note were dictated using voice recognition software and may contain dictation related errors in spelling/grammar/syntax not found on text review)    CC:   Chief Complaint   Patient presents with    Hypertension    Annual Exam       HPI: 74 y.o. female presented for routine annual examination and labs. She has medical history significant for essential hypertension, paroxysmal atrial fibrillation, aortic atherosclerosis, dementia without behavioral disturbance, hypothyroidism, thyroid eye disease.     HTN:  She takes Lotrel 10 20 mg daily, reports compliance with medication and blood pressure has been stable     Dementia:  She is followed by Neurology (Dr. Ariel Barrow), takes Aricept every day, symptoms are stable     A Fib:  She takes Eliquis 5 mg b.i.d., followed by Cardiology     Overactive bladder:  She takes Ditropan every day, symptoms are stable     Hypothyroidism:  She is on Synthroid 88 mcg. She follows up with endocrinology at due to hospital, she would like to have endocrinology closer, needs referral     She follows up with ophthalmology, Dr Masterson who is in [place] for thyroid eye disease, would like to come closer and has been having difficulty with traffic    Pt reports having epigastric pain on and off, was seen in hospital in June and has SBO, had follow up ultrasound in August which showed small bowel obstruction was resolved.     She again started to have epigastric discomfort few days ago, has been eating more spicy food lately, had spicy crabs last night, having flare up of heart burn and acid reflux, no N/V reported, BM are normal.     She is due for annual labs.       She does not smoke, has no toxic habits.    She is physically active and tries to do walk sometimes.      She denies having any other symptoms or concerns    Past Medical History:   Diagnosis Date    Hypertension        Past Surgical History:   Procedure Laterality Date    ARTHROSCOPIC REPAIR OF ROTATOR CUFF  OF SHOULDER Left 5/28/2021    Procedure: REPAIR, ROTATOR CUFF, ARTHROSCOPIC, AC resection;  Surgeon: Michael Short Jr., MD;  Location: [facility] OR;  Service: Orthopedics;  Laterality: Left;  need opus system  [employer] notified 5/11/21 CC    ARTHROSCOPY OF SHOULDER WITH DECOMPRESSION OF SUBACROMIAL SPACE  5/28/2021    Procedure: ARTHROSCOPY, SHOULDER, WITH SUBACROMIAL SPACE DECOMPRESSION;  Surgeon: Michael Short Jr., MD;  Location: [facility] OR;  Service: Orthopedics;;    CHOLECYSTECTOMY      COLONOSCOPY N/A 8/29/2022    Procedure: COLONOSCOPY Suprep;  Surgeon: Prince Cummings MD;  Location: [facility] ENDO;  Service: Endoscopy;  Laterality: N/A;    HYSTERECTOMY      partial in her 40's    OOPHORECTOMY         Family History   Problem Relation Name Age of Onset    Hyperlipidemia Mother      Heart attack Mother      Coronary artery disease Mother      Diabetes Father      Diabetes Mellitus Father      Peripheral vascular disease Father      No Known Problems Sister x1 -drowned     Heart disease Sister x1     Hypertension Sister x1     Hypertension Brother x2     Peripheral vascular disease Brother x2     No Known Problems Daughter x2     No Known Problems Son x1        Social History     Tobacco Use    Smoking status: Never    Smokeless tobacco: Never   Substance Use Topics    Alcohol use: No    Drug use: No       Lab Results   Component Value Date    WBC 5.11 08/27/2024    HGB 12.6 08/27/2024    HCT 38.6 08/27/2024    MCV 90 08/27/2024     08/27/2024    CHOL 158 08/27/2024    TRIG 55 08/27/2024    HDL 79 (H) 08/27/2024    ALT 10 08/27/2024    ALT 10 08/27/2024    AST 15 08/27/2024    AST 15 08/27/2024    BILITOT 0.4 08/27/2024    BILITOT 0.4 08/27/2024    ALKPHOS 91 08/27/2024    ALKPHOS 91 08/27/2024     08/27/2024     08/27/2024    K 3.6 08/27/2024    K 3.6 08/27/2024     08/27/2024     08/27/2024    CREATININE 0.9 08/27/2024    CREATININE 0.9 08/27/2024    ESTGFRAFRICA >60 10/15/2021     EGFRNONAA >60 10/15/2021    CALCIUM 9.1 08/27/2024    CALCIUM 9.1 08/27/2024    ALBUMIN 3.8 08/27/2024    ALBUMIN 3.8 08/27/2024    BUN 12 08/27/2024    BUN 12 08/27/2024    CO2 24 08/27/2024    CO2 24 08/27/2024    TSH 7.962 (H) 08/27/2024    INR 1.0 12/25/2019    HGBA1C 5.1 06/09/2021    LDLCALC 68.0 08/27/2024     08/27/2024     08/27/2024             Vital signs reviewed  PE:   APPEARANCE: Well nourished, well developed, in no acute distress.    HEAD: Normocephalic, atraumatic.  EYES: EOMI.  Conjunctivae noninjected.  NOSE: Mucosa pink. Airway clear.  NECK: Supple with no cervical lymphadenopathy.    CHEST: Good inspiratory effort. Lungs clear to auscultation with no wheezes or crackles.  CARDIOVASCULAR: Normal S1, S2. No rubs, murmurs, or gallops.  ABDOMEN: Bowel sounds normal. Not distended. Soft. No tenderness or masses. No organomegaly.  EXTREMITIES: No edema, cyanosis, or clubbing.    Review of Systems   Constitutional:  Negative for chills, fatigue and fever.   HENT: Negative.     Respiratory:  Negative for cough, shortness of breath and wheezing.    Cardiovascular:  Negative for chest pain, palpitations and leg swelling.   Gastrointestinal: Negative.    Genitourinary: Negative.    Neurological: Negative.    Psychiatric/Behavioral: Negative.     All other systems reviewed and are negative.      IMPRESSION  1. Annual physical exam    2. Epigastric pain    3. Hypothyroidism, unspecified type    4. Abnormal blood chemistry    5. Encounter for immunization    6. Dementia without behavioral disturbance, unspecified dementia type    7. Thyroid eye disease    8. Paroxysmal A-fib    9. Essential hypertension    10. Overweight (BMI 25.0-29.9)            PLAN      1. Epigastric pain    - CBC Auto Differential; Future    - US Abdomen Complete; Future    Advised to take Prilosec daily    Encouraged to avoid spicy food    Add high fibre in diet      2. Hypothyroidism, unspecified type    - Ambulatory  referral/consult to Endocrinology; Future    - TSH; Future  - T4, Free; Future    - Ambulatory referral/consult to Ophthalmology; Future      3. Annual physical exam (Primary)    - CBC Auto Differential; Future  - Comprehensive Metabolic Panel; Future  - TSH; Future  - T4, Free; Future  - Hemoglobin A1C; Future      4. Abnormal blood chemistry    - Hemoglobin A1C; Future      5. Encounter for immunization    - influenza (adjuvanted) (Fluad) 45 mcg/0.5 mL IM vaccine (> or = 64 yo) 0.5 mL      6. Dementia without behavioral disturbance, unspecified dementia type    Stable    Continue Aricept    Follows with neurology      7. Thyroid eye disease    Referral placed to eye doctor        8. Paroxysmal A-fib    Stable    On Eliquis      9. Essential hypertension    Controlled    Continue current medications        10. Overweight (BMI 25.0-29.9)    Counseling provided on healthy lifestyle, encouraged to do moderate intensity regular exercise 30 minutes every day 5 days a week, to include vegetables and fruits and cut back on saturated fats and carbohydrates.        SCREENINGS      Immunizations:     Flu vaccine today      Age/demographic appropriate health maintenance:    Up-to-date with DEXA scan      Health Maintenance Due   Topic Date Due    Hemoglobin A1c (Prediabetes)  06/09/2022           Spent adequate time in obtaining history and explaining differentials     40 minutes spent during this visit of which greater than 50% devoted to face-face counseling and coordination of care regarding diagnosis and management plan       Manjeet Milian   10/15/2024

## 2024-10-16 ENCOUNTER — HOSPITAL ENCOUNTER (OUTPATIENT)
Dept: RADIOLOGY | Facility: HOSPITAL | Age: 74
Discharge: HOME | End: 2024-10-16
Attending: FAMILY MEDICINE
Payer: MEDICARE

## 2024-10-16 DIAGNOSIS — R10.13 EPIGASTRIC PAIN: ICD-10-CM

## 2024-10-16 LAB
OHS QRS DURATION: 68 MS
OHS QTC CALCULATION: 449 MS

## 2024-10-16 PROCEDURE — 76700 US EXAM ABDOM COMPLETE: CPT | Mod: 26,,, | Performed by: INTERNAL MEDICINE

## 2024-10-16 PROCEDURE — 76700 US EXAM ABDOM COMPLETE: CPT | Mod: TC

## 2024-10-31 ENCOUNTER — OFFICE VISIT (OUTPATIENT)
Dept: FAMILY MEDICINE | Facility: CLINIC | Age: 74
End: 2024-10-31
Payer: MEDICARE

## 2024-10-31 VITALS
SYSTOLIC BLOOD PRESSURE: 126 MMHG | WEIGHT: 156.5 LBS | DIASTOLIC BLOOD PRESSURE: 70 MMHG | HEART RATE: 66 BPM | BODY MASS INDEX: 28.63 KG/M2 | OXYGEN SATURATION: 95 %

## 2024-10-31 DIAGNOSIS — Z00.00 ENCOUNTER FOR MEDICARE ANNUAL WELLNESS EXAM: ICD-10-CM

## 2024-10-31 DIAGNOSIS — E05.90 HYPERTHYROIDISM: ICD-10-CM

## 2024-10-31 DIAGNOSIS — M17.10 PRIMARY OSTEOARTHRITIS OF KNEE, UNSPECIFIED LATERALITY: ICD-10-CM

## 2024-10-31 DIAGNOSIS — E07.9 THYROID EYE DISEASE: ICD-10-CM

## 2024-10-31 DIAGNOSIS — M19.049 ARTHRITIS OF FINGER: ICD-10-CM

## 2024-10-31 DIAGNOSIS — H57.89 THYROID EYE DISEASE: ICD-10-CM

## 2024-10-31 DIAGNOSIS — I48.0 PAROXYSMAL A-FIB: Primary | ICD-10-CM

## 2024-10-31 DIAGNOSIS — I70.0 AORTIC ATHEROSCLEROSIS: ICD-10-CM

## 2024-10-31 DIAGNOSIS — F03.90 DEMENTIA WITHOUT BEHAVIORAL DISTURBANCE: ICD-10-CM

## 2024-10-31 DIAGNOSIS — Z00.00 ENCOUNTER FOR PREVENTIVE HEALTH EXAMINATION: ICD-10-CM

## 2024-10-31 DIAGNOSIS — I10 ESSENTIAL HYPERTENSION: ICD-10-CM

## 2024-10-31 PROCEDURE — 99999 PR PBB SHADOW E&M-EST. PATIENT-LVL V: CPT | Mod: PBBFAC,,,

## 2024-12-05 ENCOUNTER — PATIENT OUTREACH (OUTPATIENT)
Dept: NEUROLOGY | Facility: CLINIC | Age: 74
End: 2024-12-05
Payer: MEDICARE

## 2024-12-10 ENCOUNTER — PATIENT OUTREACH (OUTPATIENT)
Dept: NEUROLOGY | Facility: CLINIC | Age: 74
End: 2024-12-10
Payer: MEDICARE

## 2024-12-17 ENCOUNTER — PATIENT OUTREACH (OUTPATIENT)
Dept: NEUROLOGY | Facility: CLINIC | Age: 74
End: 2024-12-17
Payer: MEDICARE

## 2024-12-17 ENCOUNTER — OFFICE VISIT (OUTPATIENT)
Dept: FAMILY MEDICINE | Facility: CLINIC | Age: 74
End: 2024-12-17
Payer: MEDICARE

## 2024-12-17 VITALS
DIASTOLIC BLOOD PRESSURE: 70 MMHG | WEIGHT: 155.44 LBS | HEIGHT: 62 IN | BODY MASS INDEX: 28.61 KG/M2 | OXYGEN SATURATION: 98 % | HEART RATE: 69 BPM | SYSTOLIC BLOOD PRESSURE: 120 MMHG | TEMPERATURE: 98 F

## 2024-12-17 DIAGNOSIS — N95.2 ATROPHIC VAGINITIS: ICD-10-CM

## 2024-12-17 DIAGNOSIS — N89.8 VAGINAL ITCHING: Primary | ICD-10-CM

## 2024-12-17 PROCEDURE — 1159F MED LIST DOCD IN RCRD: CPT | Mod: CPTII,S$GLB,, | Performed by: FAMILY MEDICINE

## 2024-12-17 PROCEDURE — 99214 OFFICE O/P EST MOD 30 MIN: CPT | Mod: S$GLB,,, | Performed by: FAMILY MEDICINE

## 2024-12-17 PROCEDURE — 1101F PT FALLS ASSESS-DOCD LE1/YR: CPT | Mod: CPTII,S$GLB,, | Performed by: FAMILY MEDICINE

## 2024-12-17 PROCEDURE — 99999 PR PBB SHADOW E&M-EST. PATIENT-LVL V: CPT | Mod: PBBFAC,,, | Performed by: FAMILY MEDICINE

## 2024-12-17 PROCEDURE — 1126F AMNT PAIN NOTED NONE PRSNT: CPT | Mod: CPTII,S$GLB,, | Performed by: FAMILY MEDICINE

## 2024-12-17 PROCEDURE — 3074F SYST BP LT 130 MM HG: CPT | Mod: CPTII,S$GLB,, | Performed by: FAMILY MEDICINE

## 2024-12-17 PROCEDURE — 3078F DIAST BP <80 MM HG: CPT | Mod: CPTII,S$GLB,, | Performed by: FAMILY MEDICINE

## 2024-12-17 PROCEDURE — 3288F FALL RISK ASSESSMENT DOCD: CPT | Mod: CPTII,S$GLB,, | Performed by: FAMILY MEDICINE

## 2024-12-17 RX ORDER — HYDROXYZINE HYDROCHLORIDE 25 MG/1
25 TABLET, FILM COATED ORAL 3 TIMES DAILY PRN
Qty: 60 TABLET | Refills: 1 | Status: SHIPPED | OUTPATIENT
Start: 2024-12-17

## 2024-12-17 NOTE — PROGRESS NOTES
(Portions of this note were dictated using voice recognition software and may contain dictation related errors in spelling/grammar/syntax not found on text review)    CC:   Chief Complaint   Patient presents with    Vaginal Itching       HPI: 74 y.o. female presented for vaginal itching. She has medical history significant for dementia without behavioral disturbance, hypertension, paroxysmal Afib.     She reports having vaginal abd vulval itching for the past several years, she was initially treated for yeast but due to recurrent symptoms was sent to gyn in 2022, was suspected to have lichen simplex chronicus and atrophic vaginitis, she was treated with clobetasol and estrace cream which were effective initially, she has flare up recently with constant urge to itch, has been using estrace cream and clobetasol without any relief.    She reports itching is all over, on outer vagina as well as inside and on vulva, denies having any abnormal vaginal discharge, sexually active with  only on non frequent basis.      She denies any other symptoms or concerns.    Past Medical History:   Diagnosis Date    Hypertension        Past Surgical History:   Procedure Laterality Date    ARTHROSCOPIC REPAIR OF ROTATOR CUFF OF SHOULDER Left 5/28/2021    Procedure: REPAIR, ROTATOR CUFF, ARTHROSCOPIC, AC resection;  Surgeon: Michael Short Jr., MD;  Location: [facility] OR;  Service: Orthopedics;  Laterality: Left;  need opus system  [employer] notified 5/11/21 CC    ARTHROSCOPY OF SHOULDER WITH DECOMPRESSION OF SUBACROMIAL SPACE  5/28/2021    Procedure: ARTHROSCOPY, SHOULDER, WITH SUBACROMIAL SPACE DECOMPRESSION;  Surgeon: Michael Short Jr., MD;  Location: [facility] OR;  Service: Orthopedics;;    CHOLECYSTECTOMY      COLONOSCOPY N/A 8/29/2022    Procedure: COLONOSCOPY Suprep;  Surgeon: Prince Cummings MD;  Location: [facility] ENDO;  Service: Endoscopy;  Laterality: N/A;    HYSTERECTOMY      partial in her 40's    OOPHORECTOMY         Family  History   Problem Relation Name Age of Onset    Hyperlipidemia Mother      Heart attack Mother      Coronary artery disease Mother      Diabetes Father      Diabetes Mellitus Father      Peripheral vascular disease Father      No Known Problems Sister x1 -drowned     Heart disease Sister x1     Hypertension Sister x1     Hypertension Brother x2     Peripheral vascular disease Brother x2     No Known Problems Daughter x2     No Known Problems Son x1        Social History     Tobacco Use    Smoking status: Never    Smokeless tobacco: Never   Substance Use Topics    Alcohol use: No    Drug use: No       Lab Results   Component Value Date    WBC 4.88 10/16/2024    HGB 12.5 10/16/2024    HCT 39.0 10/16/2024    MCV 90 10/16/2024     10/16/2024    CHOL 158 08/27/2024    TRIG 55 08/27/2024    HDL 79 (H) 08/27/2024    ALT 13 10/16/2024    AST 15 10/16/2024    BILITOT 0.4 10/16/2024    ALKPHOS 89 10/16/2024     10/16/2024    K 3.7 10/16/2024     10/16/2024    CREATININE 0.9 10/16/2024    ESTGFRAFRICA >60 10/15/2021    EGFRNONAA >60 10/15/2021    CALCIUM 9.5 10/16/2024    ALBUMIN 3.8 10/16/2024    BUN 12 10/16/2024    CO2 26 10/16/2024    TSH 6.582 (H) 10/16/2024    INR 1.0 12/25/2019    HGBA1C 5.3 10/16/2024    LDLCALC 68.0 08/27/2024    GLU 85 10/16/2024             Vital signs reviewed  PE:   APPEARANCE: Well nourished, well developed, in no acute distress.    HEAD: Normocephalic, atraumatic.  EYES: EOMI.  Conjunctivae noninjected.  EXTREMITIES: No edema, cyanosis, or clubbing.  VAGINAL: Excoriation in vulva, no vaginal discharge present    Review of Systems   Constitutional:  Negative for chills, fatigue and fever.   HENT: Negative.     Respiratory:  Negative for cough, shortness of breath and wheezing.    Cardiovascular:  Negative for chest pain, palpitations and leg swelling.   Gastrointestinal: Negative.    Genitourinary:  Positive for vaginal dryness. Negative for dyspareunia, genital sores, hot  flashes, menstrual irregularity, menstrual problem, pelvic pain, vaginal bleeding, vaginal discharge and vaginal pain.   Neurological: Negative.    Psychiatric/Behavioral: Negative.     All other systems reviewed and are negative.      IMPRESSION  1. Vaginal itching    2. Atrophic vaginitis            PLAN      1. Atrophic vaginitis    - Ambulatory referral/consult to Gynecology; Future    Continue estrace cream as per prescription      2. Vaginal itching (Primary)    - Ambulatory referral/consult to Gynecology; Future    - hydrOXYzine HCL (ATARAX) 25 MG tablet; Take 1 tablet (25 mg total) by mouth 3 (three) times daily as needed for Itching.  Dispense: 60 tablet; Refill: 1       Continue clobetasol cream as needed    Based on last gyn note, she would need biopsy if symptoms are persistent, sent to gyn again for possible biopsy if needed      SCREENINGS        Age/demographic appropriate health maintenance:    Health Maintenance Due   Topic Date Due    RSV Vaccine (Age 60+ and Pregnant patients) (1 - 1-dose 75+ series) Never done           Spent adequate time in obtaining history and explaining differentials     35 minutes spent during this visit of which greater than 50% devoted to face-face counseling and coordination of care regarding diagnosis and management plan       Manjeet Milian   12/17/2024

## 2024-12-18 ENCOUNTER — OFFICE VISIT (OUTPATIENT)
Dept: OBSTETRICS AND GYNECOLOGY | Facility: CLINIC | Age: 74
End: 2024-12-18
Payer: MEDICARE

## 2024-12-18 VITALS
BODY MASS INDEX: 28.57 KG/M2 | SYSTOLIC BLOOD PRESSURE: 146 MMHG | DIASTOLIC BLOOD PRESSURE: 74 MMHG | WEIGHT: 156.19 LBS

## 2024-12-18 DIAGNOSIS — N89.8 VAGINAL ITCHING: ICD-10-CM

## 2024-12-18 DIAGNOSIS — N76.3 CHRONIC HYPERTROPHIC VULVITIS: ICD-10-CM

## 2024-12-18 DIAGNOSIS — N95.2 ATROPHIC VAGINITIS: ICD-10-CM

## 2024-12-18 DIAGNOSIS — Z87.2: Primary | ICD-10-CM

## 2024-12-18 PROCEDURE — 99999 PR PBB SHADOW E&M-EST. PATIENT-LVL IV: CPT | Mod: PBBFAC,,, | Performed by: OBSTETRICS & GYNECOLOGY

## 2024-12-18 PROCEDURE — 0352U VAGINOSIS SCREEN BY DNA PROBE: CPT | Performed by: OBSTETRICS & GYNECOLOGY

## 2024-12-18 RX ORDER — TRIAMCINOLONE ACETONIDE 1 MG/G
OINTMENT TOPICAL 2 TIMES DAILY
Qty: 45 G | Refills: 1 | Status: SHIPPED | OUTPATIENT
Start: 2024-12-18

## 2024-12-18 NOTE — PROGRESS NOTES
OBSTETRIC HISTORY:   OB History          3    Para   3    Term   3            AB        Living   3         SAB        IAB        Ectopic        Multiple        Live Births   3                  HPI:   74 y.o.  No LMP recorded (lmp unknown). Patient is postmenopausal.   Patient is  here complaining of vaginal itching. She has seen Dr. Xiong and Dr. Griffith. This is a chronic issue. She was sent by PCP. She admits to scratching and rubbing the area.      PE:   BP (!) 146/74   Wt 70.8 kg (156 lb 3.1 oz)   LMP  (LMP Unknown)   BMI 28.57 kg/m²   APPEARANCE: Well nourished, well developed, in no acute distress.  ABDOMEN: Soft. No tenderness or masses. No hernias.  PELVIC:   VULVA: Significant excoriations bilaterally. Normal female genitalia.  URETHRAL MEATUS: Normal size and location, no lesions, no prolapse.  URETHRA: No masses, tenderness, prolapse or scarring.  VAGINA: Atrophic and not well rugated, with estradiol cream in vault, no significant cystocele or rectocele.  CERVIX: and UTERUS: SURGICALLY ABSENT  ADNEXA: No masses or tenderness.    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 Gloves or socks on hands--no rubbing/scratching--ointment 2 x daily x 4 weeks.   Vaginal Discharge  RTO 4 weeks

## 2024-12-18 NOTE — PATIENT INSTRUCTIONS
Stop using Estradiol vaginal cream  Zyrtec qam and atarax (Hydroxyzine) or Benadryl at night. Gloves or socks on hands--no rubbing/scratching--ointment 2 x daily x 4 weeks.   Use white vinegar mixed with water half and half in a spray bottle to clean area.

## 2024-12-20 LAB
BACTERIAL VAGINOSIS DNA: NOT DETECTED
CANDIDA GLABRATA/KRUSEI: NOT DETECTED
CANDIDA RRNA VAG QL PROBE: NOT DETECTED
TRICHOMONAS VAGINALIS: NOT DETECTED

## 2025-01-15 ENCOUNTER — OFFICE VISIT (OUTPATIENT)
Dept: OBSTETRICS AND GYNECOLOGY | Facility: CLINIC | Age: 75
End: 2025-01-15
Payer: MEDICARE

## 2025-01-15 VITALS
DIASTOLIC BLOOD PRESSURE: 82 MMHG | WEIGHT: 157.19 LBS | SYSTOLIC BLOOD PRESSURE: 137 MMHG | BODY MASS INDEX: 28.75 KG/M2

## 2025-01-15 DIAGNOSIS — N76.3 CHRONIC HYPERTROPHIC VULVITIS: Primary | ICD-10-CM

## 2025-01-15 DIAGNOSIS — Z87.2: ICD-10-CM

## 2025-01-15 DIAGNOSIS — N89.8 VAGINAL ITCHING: ICD-10-CM

## 2025-01-15 PROCEDURE — 1126F AMNT PAIN NOTED NONE PRSNT: CPT | Mod: CPTII,S$GLB,, | Performed by: OBSTETRICS & GYNECOLOGY

## 2025-01-15 PROCEDURE — 3075F SYST BP GE 130 - 139MM HG: CPT | Mod: CPTII,S$GLB,, | Performed by: OBSTETRICS & GYNECOLOGY

## 2025-01-15 PROCEDURE — 1159F MED LIST DOCD IN RCRD: CPT | Mod: CPTII,S$GLB,, | Performed by: OBSTETRICS & GYNECOLOGY

## 2025-01-15 PROCEDURE — 1101F PT FALLS ASSESS-DOCD LE1/YR: CPT | Mod: CPTII,S$GLB,, | Performed by: OBSTETRICS & GYNECOLOGY

## 2025-01-15 PROCEDURE — 3079F DIAST BP 80-89 MM HG: CPT | Mod: CPTII,S$GLB,, | Performed by: OBSTETRICS & GYNECOLOGY

## 2025-01-15 PROCEDURE — 1160F RVW MEDS BY RX/DR IN RCRD: CPT | Mod: CPTII,S$GLB,, | Performed by: OBSTETRICS & GYNECOLOGY

## 2025-01-15 PROCEDURE — 99999 PR PBB SHADOW E&M-EST. PATIENT-LVL III: CPT | Mod: PBBFAC,,, | Performed by: OBSTETRICS & GYNECOLOGY

## 2025-01-15 PROCEDURE — 3288F FALL RISK ASSESSMENT DOCD: CPT | Mod: CPTII,S$GLB,, | Performed by: OBSTETRICS & GYNECOLOGY

## 2025-01-15 PROCEDURE — 99213 OFFICE O/P EST LOW 20 MIN: CPT | Mod: S$GLB,,, | Performed by: OBSTETRICS & GYNECOLOGY

## 2025-01-15 NOTE — PROGRESS NOTES
OBSTETRIC HISTORY:   OB History          3    Para   3    Term   3            AB        Living   3         SAB        IAB        Ectopic        Multiple        Live Births   3                    HPI:   74 y.o.  No LMP recorded (lmp unknown). Patient is postmenopausal.   Patient is  here for follow up. Patient is  here complaining of vaginal itching. She has seen Dr. Xiong and Dr. Griffith. This is a chronic issue. She was sent by PCP. She admits to scratching and rubbing the area. She says she can't help herself.Discussed if she doesn't stop scratching there is nothing we can do for her.      PE:   /82   Wt 71.3 kg (157 lb 3 oz)   LMP  (LMP Unknown)   BMI 28.75 kg/m²   APPEARANCE: Well nourished, well developed, in no acute distress.  ABDOMEN: Soft. No tenderness or masses. No hernias.  PELVIC:   VULVA: Decrease in erosions but still excoriations present.  URETHRAL MEATUS: Normal size and location, no lesions, no prolapse.  URETHRA: No masses, tenderness, prolapse or scarring.  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 Gloves or socks on hands--no rubbing/scratching--ointment 2 x daily x 3 weeks.     RTO 2 weeks

## 2025-01-29 ENCOUNTER — OFFICE VISIT (OUTPATIENT)
Dept: OBSTETRICS AND GYNECOLOGY | Facility: CLINIC | Age: 75
End: 2025-01-29
Payer: MEDICARE

## 2025-01-29 VITALS — BODY MASS INDEX: 28.17 KG/M2 | WEIGHT: 154 LBS | DIASTOLIC BLOOD PRESSURE: 84 MMHG | SYSTOLIC BLOOD PRESSURE: 163 MMHG

## 2025-01-29 DIAGNOSIS — N89.8 VAGINAL ITCHING: ICD-10-CM

## 2025-01-29 DIAGNOSIS — N76.3 SUBACUTE AND CHRONIC VULVITIS: ICD-10-CM

## 2025-01-29 DIAGNOSIS — N76.3 CHRONIC HYPERTROPHIC VULVITIS: Primary | ICD-10-CM

## 2025-01-29 PROCEDURE — 1101F PT FALLS ASSESS-DOCD LE1/YR: CPT | Mod: CPTII,S$GLB,, | Performed by: OBSTETRICS & GYNECOLOGY

## 2025-01-29 PROCEDURE — 99213 OFFICE O/P EST LOW 20 MIN: CPT | Mod: S$GLB,,, | Performed by: OBSTETRICS & GYNECOLOGY

## 2025-01-29 PROCEDURE — 3288F FALL RISK ASSESSMENT DOCD: CPT | Mod: CPTII,S$GLB,, | Performed by: OBSTETRICS & GYNECOLOGY

## 2025-01-29 PROCEDURE — 1126F AMNT PAIN NOTED NONE PRSNT: CPT | Mod: CPTII,S$GLB,, | Performed by: OBSTETRICS & GYNECOLOGY

## 2025-01-29 PROCEDURE — 3077F SYST BP >= 140 MM HG: CPT | Mod: CPTII,S$GLB,, | Performed by: OBSTETRICS & GYNECOLOGY

## 2025-01-29 PROCEDURE — 3079F DIAST BP 80-89 MM HG: CPT | Mod: CPTII,S$GLB,, | Performed by: OBSTETRICS & GYNECOLOGY

## 2025-01-29 PROCEDURE — 1159F MED LIST DOCD IN RCRD: CPT | Mod: CPTII,S$GLB,, | Performed by: OBSTETRICS & GYNECOLOGY

## 2025-01-29 PROCEDURE — 99999 PR PBB SHADOW E&M-EST. PATIENT-LVL III: CPT | Mod: PBBFAC,,, | Performed by: OBSTETRICS & GYNECOLOGY

## 2025-01-29 NOTE — PROGRESS NOTES
OBSTETRIC HISTORY:   OB History          3    Para   3    Term   3            AB        Living   3         SAB        IAB        Ectopic        Multiple        Live Births   3                    HPI:   74 y.o.  No LMP recorded (lmp unknown). Patient is postmenopausal.   Patient is  here for follow up of vulvar rash and itch. She states she finally feels there is some improvement and she is finally understanding that she needs to not get in the itch/scratch cycle. She has seen Dr. Xiong and Dr. Griffith. This is a chronic issue. She was sent by PCP. She admits to scratching and rubbing the area. She says she can't help herself.Discussed if she doesn't stop scratching there is nothing we can do for her.         PE:   BP (!) 163/84   Wt 69.8 kg (153 lb 15.9 oz)   LMP  (LMP Unknown)   BMI 28.17 kg/m²   APPEARANCE: Well nourished, well developed, in no acute distress.  ABDOMEN: Soft. No tenderness or masses. No hernias.  PELVIC:   VULVA: Significant improvement in excoriations and hyperkeratosis.  URETHRAL MEATUS: Normal size and location, no lesions, no prolapse.  URETHRA: No masses, tenderness, prolapse or scarring.    ASSESSMENT/PLAN:  Chronic hyperkeratosis  Acute and subacute vulvitis--continue ointment another 4 weeks then use as needed  Continue Atarax as needed along with Zyrtec in am.  RTO PRN

## 2025-03-06 ENCOUNTER — LAB VISIT (OUTPATIENT)
Dept: LAB | Facility: HOSPITAL | Age: 75
End: 2025-03-06
Attending: INTERNAL MEDICINE
Payer: MEDICARE

## 2025-03-06 DIAGNOSIS — I10 ESSENTIAL HYPERTENSION, MALIGNANT: ICD-10-CM

## 2025-03-06 DIAGNOSIS — I51.9 MYXEDEMA HEART DISEASE: Primary | ICD-10-CM

## 2025-03-06 DIAGNOSIS — R73.01 IMPAIRED FASTING GLUCOSE: ICD-10-CM

## 2025-03-06 DIAGNOSIS — E03.9 MYXEDEMA HEART DISEASE: Primary | ICD-10-CM

## 2025-03-06 LAB
ALBUMIN SERPL BCP-MCNC: 4 G/DL (ref 3.5–5.2)
ALP SERPL-CCNC: 240 U/L (ref 40–150)
ALT SERPL W/O P-5'-P-CCNC: 143 U/L (ref 10–44)
ANION GAP SERPL CALC-SCNC: 9 MMOL/L (ref 8–16)
AST SERPL-CCNC: 90 U/L (ref 10–40)
BILIRUB SERPL-MCNC: 0.4 MG/DL (ref 0.1–1)
BUN SERPL-MCNC: 14 MG/DL (ref 8–23)
CALCIUM SERPL-MCNC: 9.2 MG/DL (ref 8.7–10.5)
CHLORIDE SERPL-SCNC: 111 MMOL/L (ref 95–110)
CO2 SERPL-SCNC: 23 MMOL/L (ref 23–29)
CREAT SERPL-MCNC: 0.8 MG/DL (ref 0.5–1.4)
EST. GFR  (NO RACE VARIABLE): >60 ML/MIN/1.73 M^2
ESTIMATED AVG GLUCOSE: 111 MG/DL (ref 68–131)
GLUCOSE SERPL-MCNC: 79 MG/DL (ref 70–110)
HBA1C MFR BLD: 5.5 % (ref 4–5.6)
POTASSIUM SERPL-SCNC: 3.5 MMOL/L (ref 3.5–5.1)
PROT SERPL-MCNC: 7.6 G/DL (ref 6–8.4)
SODIUM SERPL-SCNC: 143 MMOL/L (ref 136–145)
T3FREE SERPL-MCNC: 2.5 PG/ML (ref 2.3–4.2)
T4 FREE SERPL-MCNC: 1.3 NG/DL (ref 0.71–1.51)
TSH SERPL DL<=0.005 MIU/L-ACNC: 4.17 UIU/ML (ref 0.4–4)

## 2025-03-06 PROCEDURE — 84439 ASSAY OF FREE THYROXINE: CPT | Performed by: INTERNAL MEDICINE

## 2025-03-06 PROCEDURE — 80053 COMPREHEN METABOLIC PANEL: CPT | Performed by: INTERNAL MEDICINE

## 2025-03-06 PROCEDURE — 83036 HEMOGLOBIN GLYCOSYLATED A1C: CPT | Performed by: INTERNAL MEDICINE

## 2025-03-06 PROCEDURE — 84443 ASSAY THYROID STIM HORMONE: CPT | Performed by: INTERNAL MEDICINE

## 2025-03-06 PROCEDURE — 84445 ASSAY OF TSI GLOBULIN: CPT | Performed by: INTERNAL MEDICINE

## 2025-03-06 PROCEDURE — 84481 FREE ASSAY (FT-3): CPT | Performed by: INTERNAL MEDICINE

## 2025-03-10 LAB — TSI SER-ACNC: 0.3 IU/L

## 2025-03-11 ENCOUNTER — OFFICE VISIT (OUTPATIENT)
Dept: FAMILY MEDICINE | Facility: CLINIC | Age: 75
End: 2025-03-11
Payer: MEDICARE

## 2025-03-11 VITALS
HEART RATE: 73 BPM | BODY MASS INDEX: 28.76 KG/M2 | OXYGEN SATURATION: 98 % | HEIGHT: 62 IN | TEMPERATURE: 98 F | WEIGHT: 156.31 LBS | DIASTOLIC BLOOD PRESSURE: 74 MMHG | SYSTOLIC BLOOD PRESSURE: 150 MMHG

## 2025-03-11 DIAGNOSIS — Z09 FOLLOW-UP EXAM: Primary | ICD-10-CM

## 2025-03-11 DIAGNOSIS — I48.0 PAROXYSMAL A-FIB: ICD-10-CM

## 2025-03-11 DIAGNOSIS — I10 ESSENTIAL HYPERTENSION: ICD-10-CM

## 2025-03-11 DIAGNOSIS — F03.90 DEMENTIA WITHOUT BEHAVIORAL DISTURBANCE: ICD-10-CM

## 2025-03-11 DIAGNOSIS — R74.01 TRANSAMINITIS: ICD-10-CM

## 2025-03-11 PROCEDURE — 1101F PT FALLS ASSESS-DOCD LE1/YR: CPT | Mod: CPTII,S$GLB,, | Performed by: FAMILY MEDICINE

## 2025-03-11 PROCEDURE — 3077F SYST BP >= 140 MM HG: CPT | Mod: CPTII,S$GLB,, | Performed by: FAMILY MEDICINE

## 2025-03-11 PROCEDURE — 1159F MED LIST DOCD IN RCRD: CPT | Mod: CPTII,S$GLB,, | Performed by: FAMILY MEDICINE

## 2025-03-11 PROCEDURE — 99999 PR PBB SHADOW E&M-EST. PATIENT-LVL IV: CPT | Mod: PBBFAC,,, | Performed by: FAMILY MEDICINE

## 2025-03-11 PROCEDURE — 3288F FALL RISK ASSESSMENT DOCD: CPT | Mod: CPTII,S$GLB,, | Performed by: FAMILY MEDICINE

## 2025-03-11 PROCEDURE — 1126F AMNT PAIN NOTED NONE PRSNT: CPT | Mod: CPTII,S$GLB,, | Performed by: FAMILY MEDICINE

## 2025-03-11 PROCEDURE — 99214 OFFICE O/P EST MOD 30 MIN: CPT | Mod: S$GLB,,, | Performed by: FAMILY MEDICINE

## 2025-03-11 PROCEDURE — 3078F DIAST BP <80 MM HG: CPT | Mod: CPTII,S$GLB,, | Performed by: FAMILY MEDICINE

## 2025-03-12 NOTE — PROGRESS NOTES
(Portions of this note were dictated using voice recognition software and may contain dictation related errors in spelling/grammar/syntax not found on text review)    CC:   Chief Complaint   Patient presents with    Results     For labs    Hypertension       HPI: 74 y.o. female presented for follow up. She has medical history significant for essential hypertension, paroxysmal A fib, dementia without behavioral disturbance, thyroid disease.    She follows up with endocrinology for thyroid, has labs done recently which showed elevated liver enzymes, she is advised to follow up with PCP for management of liver enzymes.    Patient stated that she is feeling fine, denies having any abdominal pain, nausea or vomiting, bowel movements are regular, denies fever, chills generalized body aches or fatigue.    She takes Lotrel 10 20 mg on daily basis, stated that has been taking medication regularly, does not monitor blood pressure at home    Eliquis for atrial fibrillation, reports compliance and heart rate has been stable    No other symptoms or concerns reported    Past Medical History:   Diagnosis Date    Hypertension        Past Surgical History:   Procedure Laterality Date    ARTHROSCOPIC REPAIR OF ROTATOR CUFF OF SHOULDER Left 5/28/2021    Procedure: REPAIR, ROTATOR CUFF, ARTHROSCOPIC, AC resection;  Surgeon: Michael Short Jr., MD;  Location: [facility] OR;  Service: Orthopedics;  Laterality: Left;  need opus system  Gnosticist notified 5/11/21 CC    ARTHROSCOPY OF SHOULDER WITH DECOMPRESSION OF SUBACROMIAL SPACE  5/28/2021    Procedure: ARTHROSCOPY, SHOULDER, WITH SUBACROMIAL SPACE DECOMPRESSION;  Surgeon: Michael Short Jr., MD;  Location: [facility] OR;  Service: Orthopedics;;    CHOLECYSTECTOMY      COLONOSCOPY N/A 8/29/2022    Procedure: COLONOSCOPY Suprep;  Surgeon: Prince Cummings MD;  Location: [facility] ENDO;  Service: Endoscopy;  Laterality: N/A;    HYSTERECTOMY      partial in her 40's    OOPHORECTOMY         Family History    Problem Relation Name Age of Onset    Hyperlipidemia Mother      Heart attack Mother      Coronary artery disease Mother      Diabetes Father      Diabetes Mellitus Father      Peripheral vascular disease Father      No Known Problems Sister x1 -drowned     Heart disease Sister x1     Hypertension Sister x1     Hypertension Brother x2     Peripheral vascular disease Brother x2     No Known Problems Daughter x2     No Known Problems Son x1        Social History[1]    Lab Results   Component Value Date    WBC 4.88 10/16/2024    HGB 12.5 10/16/2024    HCT 39.0 10/16/2024    MCV 90 10/16/2024     10/16/2024    CHOL 158 08/27/2024    TRIG 55 08/27/2024    HDL 79 (H) 08/27/2024     (H) 03/06/2025    AST 90 (H) 03/06/2025    BILITOT 0.4 03/06/2025    ALKPHOS 240 (H) 03/06/2025     03/06/2025    K 3.5 03/06/2025     (H) 03/06/2025    CREATININE 0.8 03/06/2025    ESTGFRAFRICA >60 10/15/2021    EGFRNONAA >60 10/15/2021    CALCIUM 9.2 03/06/2025    ALBUMIN 4.0 03/06/2025    BUN 14 03/06/2025    CO2 23 03/06/2025    TSH 4.168 (H) 03/06/2025    INR 1.0 12/25/2019    HGBA1C 5.5 03/06/2025    LDLCALC 68.0 08/27/2024    GLU 79 03/06/2025             Vital signs reviewed  PE:   APPEARANCE: Well nourished, well developed, in no acute distress.    HEAD: Normocephalic, atraumatic.  EYES: EOMI.  Conjunctivae noninjected.  NOSE: Mucosa pink. Airway clear.  NECK: Supple with no cervical lymphadenopathy.    CHEST: Good inspiratory effort. Lungs clear to auscultation with no wheezes or crackles.  CARDIOVASCULAR: Normal S1, S2. No rubs, murmurs, or gallops.  ABDOMEN: Bowel sounds normal. Not distended. Soft. No tenderness or masses. No organomegaly.  EXTREMITIES: No edema, cyanosis, or clubbing.    Review of Systems   Constitutional:  Negative for chills, fatigue and fever.   HENT: Negative.     Respiratory:  Negative for cough, shortness of breath and wheezing.    Cardiovascular:  Negative for chest pain,  palpitations and leg swelling.   Gastrointestinal: Negative.    Genitourinary: Negative.    Neurological: Negative.    Psychiatric/Behavioral: Negative.     All other systems reviewed and are negative.      IMPRESSION  1. Follow-up exam    2. Transaminitis    3. Dementia without behavioral disturbance    4. Paroxysmal A-fib    5. Essential hypertension            PLAN      1. Transaminitis    , AST 90,     No prior elevation in enzymes, has not taken any OTC supplements and have no symptoms    Will repeat hepatic function with hepatitis and US to check liver    - Hepatic function panel; Future  - Hepatitis Panel, Acute; Future    - US Abdomen Limited; Future      2. Dementia without behavioral disturbance    Stable    No concerns      3. Paroxysmal A-fib    Stable    Continue current medications      4. Follow-up exam (Primary)        5. Essential hypertension    Elevated    Continue same medications    Low salt diet, regular exercise    Advised to monitor bp at home and follow up         SCREENINGS        Age/demographic appropriate health maintenance:    Health Maintenance Due   Topic Date Due    RSV Vaccine (Age 60+ and Pregnant patients) (1 - 1-dose 75+ series) Never done           Spent adequate time in obtaining history and explaining differentials     35 minutes spent during this visit of which greater than 50% devoted to face-face counseling and coordination of care regarding diagnosis and management plan       Manjeet Milian        [1]   Social History  Tobacco Use    Smoking status: Never    Smokeless tobacco: Never   Substance Use Topics    Alcohol use: No    Drug use: No

## 2025-03-16 NOTE — TELEPHONE ENCOUNTER
----- Message from Andres Craft sent at 7/24/2020  9:08 AM CDT -----  Regarding: Appointment    Type:  Needs Medical Advice    Who Called:  patient  Would the patient rather a call back or a response via [facility]?  Call back  Best Call Back Number:  [phone]  Additional Information:  wants to speak with your office in regard to her appointment      
Rescheduled appt. To tues. 7/28 @ 10AM with estephania sanchez  
Toxic metabolic encephalopathy

## 2025-03-25 ENCOUNTER — RESULTS FOLLOW-UP (OUTPATIENT)
Dept: FAMILY MEDICINE | Facility: CLINIC | Age: 75
End: 2025-03-25

## 2025-03-25 ENCOUNTER — HOSPITAL ENCOUNTER (OUTPATIENT)
Dept: RADIOLOGY | Facility: HOSPITAL | Age: 75
Discharge: HOME | End: 2025-03-25
Attending: FAMILY MEDICINE
Payer: MEDICARE

## 2025-03-25 DIAGNOSIS — R74.01 TRANSAMINITIS: ICD-10-CM

## 2025-03-25 PROCEDURE — 76705 ECHO EXAM OF ABDOMEN: CPT | Mod: TC

## 2025-03-25 PROCEDURE — 76705 ECHO EXAM OF ABDOMEN: CPT | Mod: 26,,, | Performed by: INTERNAL MEDICINE

## 2025-03-27 ENCOUNTER — OFFICE VISIT (OUTPATIENT)
Dept: FAMILY MEDICINE | Facility: CLINIC | Age: 75
End: 2025-03-27
Payer: MEDICARE

## 2025-03-27 VITALS
DIASTOLIC BLOOD PRESSURE: 84 MMHG | TEMPERATURE: 98 F | HEART RATE: 72 BPM | SYSTOLIC BLOOD PRESSURE: 138 MMHG | OXYGEN SATURATION: 99 % | WEIGHT: 154.31 LBS | BODY MASS INDEX: 28.39 KG/M2 | HEIGHT: 62 IN

## 2025-03-27 DIAGNOSIS — I10 ESSENTIAL HYPERTENSION: ICD-10-CM

## 2025-03-27 DIAGNOSIS — K43.9 SUPRAUMBILICAL HERNIA: ICD-10-CM

## 2025-03-27 DIAGNOSIS — Z71.2 ENCOUNTER TO DISCUSS TEST RESULTS: Primary | ICD-10-CM

## 2025-03-27 DIAGNOSIS — R10.13 EPIGASTRIC PAIN: ICD-10-CM

## 2025-03-27 PROCEDURE — 99999 PR PBB SHADOW E&M-EST. PATIENT-LVL IV: CPT | Mod: PBBFAC,,, | Performed by: FAMILY MEDICINE

## 2025-03-27 PROCEDURE — 1159F MED LIST DOCD IN RCRD: CPT | Mod: CPTII,S$GLB,, | Performed by: FAMILY MEDICINE

## 2025-03-27 PROCEDURE — 3075F SYST BP GE 130 - 139MM HG: CPT | Mod: CPTII,S$GLB,, | Performed by: FAMILY MEDICINE

## 2025-03-27 PROCEDURE — 99214 OFFICE O/P EST MOD 30 MIN: CPT | Mod: S$GLB,,, | Performed by: FAMILY MEDICINE

## 2025-03-27 PROCEDURE — 1125F AMNT PAIN NOTED PAIN PRSNT: CPT | Mod: CPTII,S$GLB,, | Performed by: FAMILY MEDICINE

## 2025-03-27 PROCEDURE — 1101F PT FALLS ASSESS-DOCD LE1/YR: CPT | Mod: CPTII,S$GLB,, | Performed by: FAMILY MEDICINE

## 2025-03-27 PROCEDURE — 3288F FALL RISK ASSESSMENT DOCD: CPT | Mod: CPTII,S$GLB,, | Performed by: FAMILY MEDICINE

## 2025-03-27 PROCEDURE — 3079F DIAST BP 80-89 MM HG: CPT | Mod: CPTII,S$GLB,, | Performed by: FAMILY MEDICINE

## 2025-03-27 RX ORDER — FINASTERIDE 1 MG/1
TABLET, FILM COATED ORAL
COMMUNITY

## 2025-03-27 NOTE — PROGRESS NOTES
(Portions of this note were dictated using voice recognition software and may contain dictation related errors in spelling/grammar/syntax not found on text review)    CC:   Chief Complaint   Patient presents with    Abdominal Pain    Results     Of labs       HPI: 74 y.o. female presented to discuss lab results and has concerns about abdominal pain.  She has medical history significant for essential hypertension, supraumbilical hernia, atrial fibrillation, normocytic anemia, hyperlipidemia, thyroid disease.    Patient stated that she started to have abdominal pain over a week ago, it was located around the belly button, pain was constant and severe, she was about to go to the ER but then postpone the visit as needed did not want to wait, she made appointment here for evaluation.  Meanwhile she made changes in her diet and was taking Tylenol, stated that pain has been improved and she is not having any abdominal pain currently.  She had ultrasound done last year which did show bowel containing supraumbilical hernia     She was found to have elevated liver enzymes on her annual checkup, repeat liver enzymes along with hepatitis panel and ultrasound was done and she would like to go over the results    She denies having any other symptoms or concerns.    Past Medical History:   Diagnosis Date    Hypertension        Past Surgical History:   Procedure Laterality Date    ARTHROSCOPIC REPAIR OF ROTATOR CUFF OF SHOULDER Left 5/28/2021    Procedure: REPAIR, ROTATOR CUFF, ARTHROSCOPIC, AC resection;  Surgeon: Michael Short Jr., MD;  Location: [facility] OR;  Service: Orthopedics;  Laterality: Left;  need opus system  Rastafarian notified 5/11/21 CC    ARTHROSCOPY OF SHOULDER WITH DECOMPRESSION OF SUBACROMIAL SPACE  5/28/2021    Procedure: ARTHROSCOPY, SHOULDER, WITH SUBACROMIAL SPACE DECOMPRESSION;  Surgeon: Michael Short Jr., MD;  Location: [facility] OR;  Service: Orthopedics;;    CHOLECYSTECTOMY      COLONOSCOPY N/A 8/29/2022     Procedure: COLONOSCOPY Suprep;  Surgeon: Prince Cummings MD;  Location: [facility];  Service: Endoscopy;  Laterality: N/A;    HYSTERECTOMY      partial in her 40's    OOPHORECTOMY         Family History   Problem Relation Name Age of Onset    Hyperlipidemia Mother      Heart attack Mother      Coronary artery disease Mother      Diabetes Father      Diabetes Mellitus Father      Peripheral vascular disease Father      No Known Problems Sister x1 -drowned     Heart disease Sister x1     Hypertension Sister x1     Hypertension Brother x2     Peripheral vascular disease Brother x2     No Known Problems Daughter x2     No Known Problems Son x1        Social History[1]    Lab Results   Component Value Date    WBC 4.88 10/16/2024    HGB 12.5 10/16/2024    HCT 39.0 10/16/2024    MCV 90 10/16/2024     10/16/2024    CHOL 158 08/27/2024    TRIG 55 08/27/2024    HDL 79 (H) 08/27/2024    ALT 17 03/25/2025    AST 19 03/25/2025    BILITOT 0.4 03/25/2025    ALKPHOS 108 03/25/2025     03/06/2025    K 3.5 03/06/2025     (H) 03/06/2025    CREATININE 0.8 03/06/2025    ESTGFRAFRICA >60 10/15/2021    EGFRNONAA >60 10/15/2021    CALCIUM 9.2 03/06/2025    ALBUMIN 3.9 03/25/2025    BUN 14 03/06/2025    CO2 23 03/06/2025    TSH 4.168 (H) 03/06/2025    INR 1.0 12/25/2019    HGBA1C 5.5 03/06/2025    LDLCALC 68.0 08/27/2024    GLU 79 03/06/2025             Vital signs reviewed  PE:   APPEARANCE: Well nourished, well developed, in no acute distress.    HEAD: Normocephalic, atraumatic.  EYES: EOMI.  Conjunctivae noninjected.  NOSE: Mucosa pink. Airway clear.  NECK: Supple with no cervical lymphadenopathy.    CHEST: Good inspiratory effort. Lungs clear to auscultation with no wheezes or crackles.  CARDIOVASCULAR: Normal S1, S2. No rubs, murmurs, or gallops.  ABDOMEN: Bowel sounds normal. Not distended. Soft. No tenderness or masses. No organomegaly.  EXTREMITIES: No edema, cyanosis, or clubbing.    Review of Systems    Constitutional:  Negative for chills, fatigue and fever.   HENT: Negative.     Respiratory:  Negative for cough, shortness of breath and wheezing.    Cardiovascular:  Negative for chest pain, palpitations and leg swelling.   Gastrointestinal:  Positive for abdominal pain. Negative for blood in stool, change in bowel habit, constipation, diarrhea, nausea, rectal pain, vomiting, reflux and fecal incontinence.   Genitourinary: Negative.    Musculoskeletal: Negative.    Neurological: Negative.    Psychiatric/Behavioral: Negative.     All other systems reviewed and are negative.      IMPRESSION  1. Encounter to discuss test results    2. Supraumbilical hernia    3. Epigastric pain    4. Essential hypertension            PLAN      1. Encounter to discuss test results (Primary)    Labs reviewed with her, normal liver enzymes, hepatitis panel is negative     Reviewed ultrasound results with her, she has no fatty liver, gallbladder is surgically absent      2. Supraumbilical hernia    3. Epigastric pain    Pain improved     Might be due to hernia     Advised to monitor for any progression of symptoms, in case of constant pain, she is advised to go to the ER or contact us here to have surgery referral    Discussed red flags with her     Advised high-fiber diet, stool softener as needed to avoid constipation     Maintain enough hydration      4. Essential hypertension     Controlled    Continue Lotrel        SCREENINGS        Age/demographic appropriate health maintenance:    Health Maintenance Due   Topic Date Due    RSV Vaccine (Age 60+ and Pregnant patients) (1 - 1-dose 75+ series) Never done         Return ED/UC precautions given      Spent adequate time in obtaining history and explaining differentials     35 minutes spent during this visit of which greater than 50% devoted to face-face counseling and coordination of care regarding diagnosis and management plan       Manjeet Milian   3/27/2025       [1]   Social  History  Tobacco Use    Smoking status: Never    Smokeless tobacco: Never   Substance Use Topics    Alcohol use: No    Drug use: No

## 2025-03-28 DIAGNOSIS — I10 ESSENTIAL HYPERTENSION: ICD-10-CM

## 2025-03-28 RX ORDER — AMLODIPINE AND BENAZEPRIL HYDROCHLORIDE 10; 20 MG/1; MG/1
1 CAPSULE ORAL
Qty: 90 CAPSULE | Refills: 3 | Status: SHIPPED | OUTPATIENT
Start: 2025-03-28

## 2025-04-08 DIAGNOSIS — R35.89 POLYURIA: ICD-10-CM

## 2025-04-08 RX ORDER — OXYBUTYNIN CHLORIDE 5 MG/1
10 TABLET, EXTENDED RELEASE ORAL
Qty: 180 TABLET | Refills: 3 | Status: SHIPPED | OUTPATIENT
Start: 2025-04-08

## 2025-04-30 DIAGNOSIS — I70.0 AORTIC ATHEROSCLEROSIS: ICD-10-CM

## 2025-04-30 RX ORDER — ROSUVASTATIN CALCIUM 5 MG/1
5 TABLET, COATED ORAL
Qty: 90 TABLET | Refills: 3 | Status: SHIPPED | OUTPATIENT
Start: 2025-04-30

## 2025-07-08 ENCOUNTER — PATIENT OUTREACH (OUTPATIENT)
Dept: ADMINISTRATIVE | Facility: CLINIC | Age: 75
End: 2025-07-08
Payer: MEDICARE

## 2025-07-08 NOTE — PROGRESS NOTES
C3 nurse attempted to contact Flor Erwin  for a TCC post hospital discharge follow up call. No answer.     The patient does not have a scheduled HOSFU appointment. Message sent to PCP's staff to assist with HOSFU appointment scheduling.

## 2025-07-14 ENCOUNTER — PATIENT MESSAGE (OUTPATIENT)
Dept: FAMILY MEDICINE | Facility: CLINIC | Age: 75
End: 2025-07-14
Payer: MEDICARE

## 2025-07-21 ENCOUNTER — TELEPHONE (OUTPATIENT)
Dept: FAMILY MEDICINE | Facility: CLINIC | Age: 75
End: 2025-07-21
Payer: MEDICARE

## 2025-07-21 DIAGNOSIS — Z12.31 ENCOUNTER FOR SCREENING MAMMOGRAM FOR BREAST CANCER: Primary | ICD-10-CM

## 2025-07-21 NOTE — TELEPHONE ENCOUNTER
Copied from CRM #[id number]. Topic: Appointments - Amb Referral  >> Jul 21, 2025  1:20 PM Chacha wrote:  Type:  Mammogram    Caller is requesting to schedule their annual mammogram appointment.  Order is not listed in EPIC.  Please enter order and contact patient to schedule.  Name of Caller: Pt   Where would they like the mammogram performed? [facility]   Would the patient rather a call back or a response via [facility]? Call back   Best Call Back Number: [phone]

## 2025-07-28 ENCOUNTER — HOSPITAL ENCOUNTER (OUTPATIENT)
Dept: RADIOLOGY | Facility: HOSPITAL | Age: 75
Discharge: HOME | End: 2025-07-28
Attending: FAMILY MEDICINE
Payer: MEDICARE

## 2025-07-28 DIAGNOSIS — Z12.31 ENCOUNTER FOR SCREENING MAMMOGRAM FOR BREAST CANCER: ICD-10-CM

## 2025-07-28 PROCEDURE — 77063 BREAST TOMOSYNTHESIS BI: CPT | Mod: TC

## 2025-07-28 PROCEDURE — 77063 BREAST TOMOSYNTHESIS BI: CPT | Mod: 26,,, | Performed by: RADIOLOGY

## 2025-07-28 PROCEDURE — 77067 SCR MAMMO BI INCL CAD: CPT | Mod: 26,,, | Performed by: RADIOLOGY

## 2025-08-05 ENCOUNTER — TELEPHONE (OUTPATIENT)
Dept: FAMILY MEDICINE | Facility: CLINIC | Age: 75
End: 2025-08-05
Payer: MEDICARE

## 2025-08-05 NOTE — TELEPHONE ENCOUNTER
Copied from CRM #[id number]. Topic: General Inquiry - Return Call  >> Aug 4, 2025 12:03 PM Connie wrote:  .Type:  Needs Medical Advice    Who Called: pt    Would the patient rather a call back or a response via [facility]? Call back  Best Call Back Number: [phone]  Additional Information:      Pt stated she received a message to call Kitty and would like a call back please

## 2025-08-14 ENCOUNTER — OFFICE VISIT (OUTPATIENT)
Dept: FAMILY MEDICINE | Facility: CLINIC | Age: 75
End: 2025-08-14
Payer: MEDICARE

## 2025-08-14 VITALS
WEIGHT: 149.5 LBS | SYSTOLIC BLOOD PRESSURE: 128 MMHG | TEMPERATURE: 99 F | HEART RATE: 75 BPM | DIASTOLIC BLOOD PRESSURE: 66 MMHG | HEIGHT: 62 IN | OXYGEN SATURATION: 100 % | BODY MASS INDEX: 27.51 KG/M2

## 2025-08-14 DIAGNOSIS — E07.9 THYROID DISEASE: ICD-10-CM

## 2025-08-14 DIAGNOSIS — Z98.890 S/P HERNIA REPAIR: ICD-10-CM

## 2025-08-14 DIAGNOSIS — E78.5 HYPERLIPIDEMIA, UNSPECIFIED HYPERLIPIDEMIA TYPE: ICD-10-CM

## 2025-08-14 DIAGNOSIS — Z00.00 ANNUAL PHYSICAL EXAM: Primary | ICD-10-CM

## 2025-08-14 DIAGNOSIS — I48.0 PAROXYSMAL A-FIB: ICD-10-CM

## 2025-08-14 DIAGNOSIS — Z87.19 S/P HERNIA REPAIR: ICD-10-CM

## 2025-08-14 DIAGNOSIS — I10 ESSENTIAL HYPERTENSION: ICD-10-CM

## 2025-08-14 PROCEDURE — 1101F PT FALLS ASSESS-DOCD LE1/YR: CPT | Mod: CPTII,S$GLB,, | Performed by: FAMILY MEDICINE

## 2025-08-14 PROCEDURE — 99397 PER PM REEVAL EST PAT 65+ YR: CPT | Mod: S$GLB,,, | Performed by: FAMILY MEDICINE

## 2025-08-14 PROCEDURE — 1126F AMNT PAIN NOTED NONE PRSNT: CPT | Mod: CPTII,S$GLB,, | Performed by: FAMILY MEDICINE

## 2025-08-14 PROCEDURE — 3078F DIAST BP <80 MM HG: CPT | Mod: CPTII,S$GLB,, | Performed by: FAMILY MEDICINE

## 2025-08-14 PROCEDURE — 1159F MED LIST DOCD IN RCRD: CPT | Mod: CPTII,S$GLB,, | Performed by: FAMILY MEDICINE

## 2025-08-14 PROCEDURE — 3074F SYST BP LT 130 MM HG: CPT | Mod: CPTII,S$GLB,, | Performed by: FAMILY MEDICINE

## 2025-08-14 PROCEDURE — 3288F FALL RISK ASSESSMENT DOCD: CPT | Mod: CPTII,S$GLB,, | Performed by: FAMILY MEDICINE

## 2025-08-14 PROCEDURE — 99999 PR PBB SHADOW E&M-EST. PATIENT-LVL IV: CPT | Mod: PBBFAC,,, | Performed by: FAMILY MEDICINE

## 2025-08-16 ENCOUNTER — LAB VISIT (OUTPATIENT)
Dept: LAB | Facility: HOSPITAL | Age: 75
End: 2025-08-16
Attending: FAMILY MEDICINE
Payer: MEDICARE

## 2025-08-16 DIAGNOSIS — R73.01 IMPAIRED FASTING GLUCOSE: ICD-10-CM

## 2025-08-16 DIAGNOSIS — I51.9 MYXEDEMA HEART DISEASE: Primary | ICD-10-CM

## 2025-08-16 DIAGNOSIS — E03.9 MYXEDEMA HEART DISEASE: Primary | ICD-10-CM

## 2025-08-16 DIAGNOSIS — Z00.00 ANNUAL PHYSICAL EXAM: ICD-10-CM

## 2025-08-16 LAB
ABSOLUTE EOSINOPHIL (OHS): 0.21 K/UL
ABSOLUTE MONOCYTE (OHS): 0.53 K/UL (ref 0.3–1)
ABSOLUTE NEUTROPHIL COUNT (OHS): 2.33 K/UL (ref 1.8–7.7)
ALBUMIN SERPL BCP-MCNC: 4 G/DL (ref 3.5–5.2)
ALP SERPL-CCNC: 91 UNIT/L (ref 40–150)
ALT SERPL W/O P-5'-P-CCNC: 8 UNIT/L (ref 10–44)
ANION GAP (OHS): 7 MMOL/L (ref 8–16)
AST SERPL-CCNC: 17 UNIT/L (ref 11–45)
BASOPHILS # BLD AUTO: 0.02 K/UL
BASOPHILS NFR BLD AUTO: 0.4 %
BILIRUB SERPL-MCNC: 0.3 MG/DL (ref 0.1–1)
BUN SERPL-MCNC: 14 MG/DL (ref 8–23)
CALCIUM SERPL-MCNC: 8.9 MG/DL (ref 8.7–10.5)
CHLORIDE SERPL-SCNC: 107 MMOL/L (ref 95–110)
CHOLEST SERPL-MCNC: 152 MG/DL (ref 120–199)
CHOLEST/HDLC SERPL: 2.2 {RATIO} (ref 2–5)
CO2 SERPL-SCNC: 26 MMOL/L (ref 23–29)
CREAT SERPL-MCNC: 0.8 MG/DL (ref 0.5–1.4)
EAG (OHS): 97 MG/DL (ref 68–131)
EAG (OHS): 97 MG/DL (ref 68–131)
ERYTHROCYTE [DISTWIDTH] IN BLOOD BY AUTOMATED COUNT: 16.5 % (ref 11.5–14.5)
GFR SERPLBLD CREATININE-BSD FMLA CKD-EPI: >60 ML/MIN/1.73/M2
GLUCOSE SERPL-MCNC: 91 MG/DL (ref 70–110)
HBA1C MFR BLD: 5 % (ref 4–5.6)
HBA1C MFR BLD: 5 % (ref 4–5.6)
HCT VFR BLD AUTO: 35.3 % (ref 37–48.5)
HDLC SERPL-MCNC: 68 MG/DL (ref 40–75)
HDLC SERPL: 44.7 % (ref 20–50)
HGB BLD-MCNC: 10.9 GM/DL (ref 12–16)
IMM GRANULOCYTES # BLD AUTO: 0.01 K/UL (ref 0–0.04)
IMM GRANULOCYTES NFR BLD AUTO: 0.2 % (ref 0–0.5)
IRON SATN MFR SERPL: 14 % (ref 20–50)
IRON SERPL-MCNC: 51 UG/DL (ref 30–160)
LDLC SERPL CALC-MCNC: 70.4 MG/DL (ref 63–159)
LYMPHOCYTES # BLD AUTO: 1.51 K/UL (ref 1–4.8)
MCH RBC QN AUTO: 26.7 PG (ref 27–31)
MCHC RBC AUTO-ENTMCNC: 30.9 G/DL (ref 32–36)
MCV RBC AUTO: 87 FL (ref 82–98)
NONHDLC SERPL-MCNC: 84 MG/DL
NUCLEATED RBC (/100WBC) (OHS): 0 /100 WBC
PLATELET # BLD AUTO: 285 K/UL (ref 150–450)
PMV BLD AUTO: 10.1 FL (ref 9.2–12.9)
POTASSIUM SERPL-SCNC: 3.6 MMOL/L (ref 3.5–5.1)
PROT SERPL-MCNC: 7.2 GM/DL (ref 6–8.4)
RBC # BLD AUTO: 4.08 M/UL (ref 4–5.4)
RELATIVE EOSINOPHIL (OHS): 4.6 %
RELATIVE LYMPHOCYTE (OHS): 32.8 % (ref 18–48)
RELATIVE MONOCYTE (OHS): 11.5 % (ref 4–15)
RELATIVE NEUTROPHIL (OHS): 50.5 % (ref 38–73)
SODIUM SERPL-SCNC: 140 MMOL/L (ref 136–145)
T4 FREE SERPL-MCNC: 1.22 NG/DL (ref 0.71–1.51)
T4 FREE SERPL-MCNC: 1.22 NG/DL (ref 0.71–1.51)
TIBC SERPL-MCNC: 376 UG/DL (ref 250–450)
TRANSFERRIN SERPL-MCNC: 254 MG/DL (ref 200–375)
TRIGL SERPL-MCNC: 68 MG/DL (ref 30–150)
TSH SERPL-ACNC: 3.04 UIU/ML (ref 0.4–4)
WBC # BLD AUTO: 4.61 K/UL (ref 3.9–12.7)

## 2025-08-16 PROCEDURE — 84466 ASSAY OF TRANSFERRIN: CPT

## 2025-08-16 PROCEDURE — 84445 ASSAY OF TSI GLOBULIN: CPT

## 2025-08-16 PROCEDURE — 85025 COMPLETE CBC W/AUTO DIFF WBC: CPT

## 2025-08-16 PROCEDURE — 80061 LIPID PANEL: CPT

## 2025-08-16 PROCEDURE — 80053 COMPREHEN METABOLIC PANEL: CPT

## 2025-08-16 PROCEDURE — 83036 HEMOGLOBIN GLYCOSYLATED A1C: CPT

## 2025-08-16 PROCEDURE — 36415 COLL VENOUS BLD VENIPUNCTURE: CPT

## 2025-08-16 PROCEDURE — 84443 ASSAY THYROID STIM HORMONE: CPT

## 2025-08-18 ENCOUNTER — RESULTS FOLLOW-UP (OUTPATIENT)
Dept: FAMILY MEDICINE | Facility: CLINIC | Age: 75
End: 2025-08-18
Payer: MEDICARE

## 2025-08-20 LAB — W THYROID STIMULATING IG (TSI): 0.15 IU/L

## (undated) DEVICE — SEE MEDLINE ITEM 157125

## (undated) DEVICE — SEE MEDLINE ITEM 157131

## (undated) DEVICE — WAND COBLATION TURBOVAC 90

## (undated) DEVICE — SEE MEDLINE ITEM 152622

## (undated) DEVICE — TUBE SET INFLOW/OUTFLOW

## (undated) DEVICE — SEE L#120831

## (undated) DEVICE — SUPPORT SLING SHOT II MEDIUM

## (undated) DEVICE — DRAPE SHOULDER 160X102IN 10/CA

## (undated) DEVICE — PACK BASIC

## (undated) DEVICE — SEE MEDLINE ITEM 146420

## (undated) DEVICE — PAD ABDOMINAL 5X9 STERILE

## (undated) DEVICE — DRAPE PLASTIC U 60X72

## (undated) DEVICE — DRESSING AQUACEL SACRAL 8 X 7

## (undated) DEVICE — COVER OVERHEAD SURG LT BLUE

## (undated) DEVICE — APPLICATOR CHLORAPREP ORN 26ML

## (undated) DEVICE — SEE MEDLINE ITEM 157116

## (undated) DEVICE — NDL SPINAL 18GX3.5 SPINOCAN

## (undated) DEVICE — DRESSING XEROFORM FOIL PK 1X8

## (undated) DEVICE — Device

## (undated) DEVICE — MANIFOLD 4 PORT

## (undated) DEVICE — BLADE SHAVER 4.5 6/BX

## (undated) DEVICE — INSTRAMOD SHOULDER TRACTION

## (undated) DEVICE — ELECTRODE REM PLYHSV RETURN 9

## (undated) DEVICE — GAUZE SPONGE 4X4 12PLY

## (undated) DEVICE — SOL IRR NACL .9% 3000ML

## (undated) DEVICE — SUT ETHILON 3/0 18IN PS-1

## (undated) DEVICE — SEE MEDLINE ITEM 146313

## (undated) DEVICE — SUPPORT ULNA NERVE PROTECTOR

## (undated) DEVICE — ALCOHOL 70% ISOP W/GREEN 16OZ

## (undated) DEVICE — TAPE ADH MEDIPORE 4 X 10YDS

## (undated) DEVICE — SEE MEDLINE ITEM 156955

## (undated) DEVICE — SEE MEDLINE ITEM 157117

## (undated) DEVICE — SEE MEDLINE ITEM 146292

## (undated) DEVICE — GLOVE SURG BIOGEL LATEX SZ 7.5